# Patient Record
Sex: FEMALE | Race: WHITE | Employment: OTHER | URBAN - NONMETROPOLITAN AREA
[De-identification: names, ages, dates, MRNs, and addresses within clinical notes are randomized per-mention and may not be internally consistent; named-entity substitution may affect disease eponyms.]

---

## 2017-01-23 ENCOUNTER — DOCTOR'S OFFICE (OUTPATIENT)
Dept: URBAN - NONMETROPOLITAN AREA CLINIC 1 | Facility: CLINIC | Age: 82
Setting detail: OPHTHALMOLOGY
End: 2017-01-23
Payer: COMMERCIAL

## 2017-01-23 DIAGNOSIS — C44.102: ICD-10-CM

## 2017-01-23 DIAGNOSIS — H04.122: ICD-10-CM

## 2017-01-23 DIAGNOSIS — H02.832: ICD-10-CM

## 2017-01-23 DIAGNOSIS — H02.051: ICD-10-CM

## 2017-01-23 DIAGNOSIS — H04.121: ICD-10-CM

## 2017-01-23 PROCEDURE — 92012 INTRM OPH EXAM EST PATIENT: CPT | Performed by: OPHTHALMOLOGY

## 2017-01-23 PROCEDURE — 67820 REVISE EYELASHES: CPT | Performed by: OPHTHALMOLOGY

## 2017-01-23 PROCEDURE — 83861 MICROFLUID ANALY TEARS: CPT | Performed by: OPHTHALMOLOGY

## 2017-01-23 ASSESSMENT — REFRACTION_CURRENTRX
OD_VPRISM_DIRECTION: PROGS
OD_OVR_VA: 20/
OS_ADD: +2.75
OS_OVR_VA: 20/
OD_CYLINDER: -1.25
OD_OVR_VA: 20/
OD_AXIS: 65
OS_AXIS: 172
OD_ADD: +2.75
OS_OVR_VA: 20/
OS_VPRISM_DIRECTION: PROGS
OD_OVR_VA: 20/
OS_CYLINDER: -0.75
OS_OVR_VA: 20/
OD_SPHERE: +1.25
OS_SPHERE: +1.00

## 2017-01-23 ASSESSMENT — REFRACTION_AUTOREFRACTION
OS_AXIS: 7
OS_CYLINDER: -0.50
OD_SPHERE: +0.50
OD_CYLINDER: -0.25
OS_SPHERE: +1.25
OD_AXIS: 71

## 2017-01-23 ASSESSMENT — REFRACTION_MANIFEST
OD_VA1: 20/
OS_VA3: 20/
OD_VA1: 20/
OS_VA3: 20/
OD_VA2: 20/
OS_VA3: 20/
OU_VA: 20/
OD_VA2: 20/
OD_VA3: 20/
OD_VA3: 20/
OS_VA2: 20/
OS_VA1: 20/
OS_VA1: 20/
OU_VA: 20/
OD_VA1: 20/
OD_VA3: 20/
OU_VA: 20/
OS_VA1: 20/
OD_VA2: 20/

## 2017-01-23 ASSESSMENT — SPHEQUIV_DERIVED
OD_SPHEQUIV: 0.375
OS_SPHEQUIV: 1

## 2017-01-23 ASSESSMENT — LID POSITION - DERMATOCHALASIS
OD_DERMATOCHALASIS: 1+
OS_DERMATOCHALASIS: 1+

## 2017-01-23 ASSESSMENT — VISUAL ACUITY
OS_BCVA: 20/30
OD_BCVA: 20/30+1

## 2017-01-23 ASSESSMENT — SUPERFICIAL PUNCTATE KERATITIS (SPK)
OD_SPK: 1+ 2+
OS_SPK: 1+ 2+

## 2017-01-23 ASSESSMENT — LID EXAM ASSESSMENTS: OD_TRICHIASIS: RUL

## 2017-02-06 ENCOUNTER — DOCTOR'S OFFICE (OUTPATIENT)
Dept: URBAN - NONMETROPOLITAN AREA CLINIC 1 | Facility: CLINIC | Age: 82
Setting detail: OPHTHALMOLOGY
End: 2017-02-06
Payer: COMMERCIAL

## 2017-02-06 DIAGNOSIS — H10.11: ICD-10-CM

## 2017-02-06 DIAGNOSIS — H04.121: ICD-10-CM

## 2017-02-06 DIAGNOSIS — C44.102: ICD-10-CM

## 2017-02-06 DIAGNOSIS — H04.122: ICD-10-CM

## 2017-02-06 DIAGNOSIS — M31.6: ICD-10-CM

## 2017-02-06 DIAGNOSIS — H02.422: ICD-10-CM

## 2017-02-06 DIAGNOSIS — H02.832: ICD-10-CM

## 2017-02-06 DIAGNOSIS — H02.421: ICD-10-CM

## 2017-02-06 DIAGNOSIS — H10.12: ICD-10-CM

## 2017-02-06 DIAGNOSIS — H01.001: ICD-10-CM

## 2017-02-06 PROCEDURE — 92012 INTRM OPH EXAM EST PATIENT: CPT | Performed by: OPHTHALMOLOGY

## 2017-02-06 ASSESSMENT — REFRACTION_CURRENTRX
OD_OVR_VA: 20/
OD_OVR_VA: 20/
OS_ADD: +2.75
OD_ADD: +2.75
OS_VPRISM_DIRECTION: PROGS
OS_OVR_VA: 20/
OD_SPHERE: +1.25
OS_AXIS: 172
OD_OVR_VA: 20/
OD_AXIS: 65
OD_CYLINDER: -1.25
OS_CYLINDER: -0.75
OS_SPHERE: +1.00
OD_VPRISM_DIRECTION: PROGS
OS_OVR_VA: 20/
OS_OVR_VA: 20/

## 2017-02-06 ASSESSMENT — REFRACTION_MANIFEST
OD_VA2: 20/
OU_VA: 20/
OS_VA2: 20/
OD_VA3: 20/
OD_VA2: 20/
OU_VA: 20/
OS_VA1: 20/
OD_VA2: 20/
OD_VA1: 20/
OS_VA2: 20/
OS_VA3: 20/
OD_VA3: 20/
OS_VA1: 20/
OS_VA3: 20/
OU_VA: 20/
OD_VA1: 20/
OS_VA3: 20/
OD_VA3: 20/
OS_VA2: 20/
OS_VA1: 20/
OD_VA1: 20/

## 2017-02-06 ASSESSMENT — REFRACTION_AUTOREFRACTION
OS_SPHERE: +1.25
OD_SPHERE: +0.50
OS_CYLINDER: -0.50
OS_AXIS: 7
OD_AXIS: 71
OD_CYLINDER: -0.25

## 2017-02-06 ASSESSMENT — SUPERFICIAL PUNCTATE KERATITIS (SPK)
OD_SPK: 2+ 3+
OS_SPK: 2+

## 2017-02-06 ASSESSMENT — VISUAL ACUITY
OD_BCVA: 20/30+2
OS_BCVA: 20/30

## 2017-02-06 ASSESSMENT — LID POSITION - DERMATOCHALASIS
OS_DERMATOCHALASIS: 1+
OD_DERMATOCHALASIS: 1+

## 2017-02-06 ASSESSMENT — LID POSITION - PTOSIS
OS_PTOSIS: 1+
OD_PTOSIS: 1+

## 2017-02-06 ASSESSMENT — CONFRONTATIONAL VISUAL FIELD TEST (CVF)
OS_FINDINGS: FULL
OD_FINDINGS: FULL

## 2017-02-06 ASSESSMENT — SPHEQUIV_DERIVED
OD_SPHEQUIV: 0.375
OS_SPHEQUIV: 1

## 2017-03-13 ENCOUNTER — DOCTOR'S OFFICE (OUTPATIENT)
Dept: URBAN - NONMETROPOLITAN AREA CLINIC 1 | Facility: CLINIC | Age: 82
Setting detail: OPHTHALMOLOGY
End: 2017-03-13
Payer: COMMERCIAL

## 2017-03-13 DIAGNOSIS — H43.811: ICD-10-CM

## 2017-03-13 DIAGNOSIS — Z79.891: ICD-10-CM

## 2017-03-13 DIAGNOSIS — M06.9: ICD-10-CM

## 2017-03-13 DIAGNOSIS — H04.121: ICD-10-CM

## 2017-03-13 DIAGNOSIS — H04.122: ICD-10-CM

## 2017-03-13 DIAGNOSIS — H43.812: ICD-10-CM

## 2017-03-13 PROCEDURE — 92134 CPTRZ OPH DX IMG PST SGM RTA: CPT | Performed by: OPHTHALMOLOGY

## 2017-03-13 PROCEDURE — 92226 OPHTHALMOSCOPY EXT SUBSEQUENT: CPT | Performed by: OPHTHALMOLOGY

## 2017-03-13 PROCEDURE — 92014 COMPRE OPH EXAM EST PT 1/>: CPT | Performed by: OPHTHALMOLOGY

## 2017-03-13 ASSESSMENT — REFRACTION_MANIFEST
OD_VA2: 20/
OD_VA3: 20/
OS_VA3: 20/
OS_VA1: 20/
OU_VA: 20/
OS_VA3: 20/
OS_VA2: 20/
OD_VA1: 20/
OD_VA3: 20/
OS_VA3: 20/
OU_VA: 20/
OD_VA2: 20/
OD_VA3: 20/
OS_VA2: 20/
OD_VA1: 20/
OD_VA1: 20/
OS_VA1: 20/
OS_VA1: 20/
OS_VA2: 20/
OD_VA2: 20/
OU_VA: 20/

## 2017-03-13 ASSESSMENT — REFRACTION_AUTOREFRACTION
OD_AXIS: 71
OD_SPHERE: +0.50
OS_SPHERE: +1.25
OS_AXIS: 7
OS_CYLINDER: -0.50
OD_CYLINDER: -0.25

## 2017-03-13 ASSESSMENT — LID POSITION - PTOSIS
OS_PTOSIS: 1+
OD_PTOSIS: 1+

## 2017-03-13 ASSESSMENT — REFRACTION_CURRENTRX
OD_ADD: +2.75
OS_ADD: +2.75
OD_OVR_VA: 20/
OS_SPHERE: +1.00
OD_OVR_VA: 20/
OS_CYLINDER: -0.75
OS_VPRISM_DIRECTION: PROGS
OD_CYLINDER: -1.25
OS_OVR_VA: 20/
OS_AXIS: 172
OD_SPHERE: +1.25
OD_OVR_VA: 20/
OD_VPRISM_DIRECTION: PROGS
OS_OVR_VA: 20/
OD_AXIS: 65
OS_OVR_VA: 20/

## 2017-03-13 ASSESSMENT — SUPERFICIAL PUNCTATE KERATITIS (SPK)
OS_SPK: 2+
OD_SPK: 2+ 3+

## 2017-03-13 ASSESSMENT — VISUAL ACUITY
OD_BCVA: 20/40-2
OS_BCVA: 20/25-1

## 2017-03-13 ASSESSMENT — SPHEQUIV_DERIVED
OS_SPHEQUIV: 1
OD_SPHEQUIV: 0.375

## 2017-03-13 ASSESSMENT — LID POSITION - DERMATOCHALASIS
OD_DERMATOCHALASIS: 1+
OS_DERMATOCHALASIS: 1+

## 2017-03-13 ASSESSMENT — CONFRONTATIONAL VISUAL FIELD TEST (CVF)
OD_FINDINGS: FULL
OS_FINDINGS: FULL

## 2017-04-10 ENCOUNTER — DOCTOR'S OFFICE (OUTPATIENT)
Dept: URBAN - NONMETROPOLITAN AREA CLINIC 1 | Facility: CLINIC | Age: 82
Setting detail: OPHTHALMOLOGY
End: 2017-04-10
Payer: COMMERCIAL

## 2017-04-10 DIAGNOSIS — H04.122: ICD-10-CM

## 2017-04-10 DIAGNOSIS — C44.102: ICD-10-CM

## 2017-04-10 DIAGNOSIS — H02.421: ICD-10-CM

## 2017-04-10 DIAGNOSIS — H10.12: ICD-10-CM

## 2017-04-10 DIAGNOSIS — H10.11: ICD-10-CM

## 2017-04-10 DIAGNOSIS — H02.051: ICD-10-CM

## 2017-04-10 DIAGNOSIS — H04.121: ICD-10-CM

## 2017-04-10 DIAGNOSIS — H01.001: ICD-10-CM

## 2017-04-10 DIAGNOSIS — H02.832: ICD-10-CM

## 2017-04-10 PROCEDURE — 83861 MICROFLUID ANALY TEARS: CPT | Performed by: OPHTHALMOLOGY

## 2017-04-10 PROCEDURE — 92012 INTRM OPH EXAM EST PATIENT: CPT | Performed by: OPHTHALMOLOGY

## 2017-04-10 ASSESSMENT — REFRACTION_MANIFEST
OD_VA3: 20/
OD_VA2: 20/
OS_VA2: 20/
OU_VA: 20/
OS_VA3: 20/
OD_VA1: 20/
OD_VA2: 20/
OS_VA2: 20/
OU_VA: 20/
OU_VA: 20/
OD_VA3: 20/
OS_VA3: 20/
OS_VA1: 20/
OS_VA2: 20/
OD_VA3: 20/
OS_VA1: 20/
OD_VA2: 20/
OS_VA1: 20/
OD_VA1: 20/
OS_VA3: 20/
OD_VA1: 20/

## 2017-04-10 ASSESSMENT — REFRACTION_CURRENTRX
OS_ADD: +2.75
OD_OVR_VA: 20/
OS_OVR_VA: 20/
OD_CYLINDER: -1.25
OS_OVR_VA: 20/
OS_CYLINDER: -0.75
OD_OVR_VA: 20/
OD_AXIS: 65
OD_ADD: +2.75
OD_SPHERE: +1.25
OS_SPHERE: +1.00
OD_OVR_VA: 20/
OS_OVR_VA: 20/
OS_AXIS: 172
OS_VPRISM_DIRECTION: PROGS
OD_VPRISM_DIRECTION: PROGS

## 2017-04-10 ASSESSMENT — REFRACTION_AUTOREFRACTION
OS_SPHERE: +1.25
OS_CYLINDER: -0.50
OS_AXIS: 7
OD_SPHERE: +0.50
OD_AXIS: 71
OD_CYLINDER: -0.25

## 2017-04-10 ASSESSMENT — SUPERFICIAL PUNCTATE KERATITIS (SPK)
OD_SPK: 2+
OS_SPK: 2+

## 2017-04-10 ASSESSMENT — VISUAL ACUITY
OS_BCVA: 20/25-2
OD_BCVA: 20/40

## 2017-04-10 ASSESSMENT — LID POSITION - DERMATOCHALASIS
OS_DERMATOCHALASIS: 1+
OD_DERMATOCHALASIS: 1+

## 2017-04-10 ASSESSMENT — LID POSITION - PTOSIS
OD_PTOSIS: 1+
OS_PTOSIS: 1+

## 2017-04-10 ASSESSMENT — SPHEQUIV_DERIVED
OD_SPHEQUIV: 0.375
OS_SPHEQUIV: 1

## 2017-08-24 ENCOUNTER — DOCTOR'S OFFICE (OUTPATIENT)
Dept: URBAN - NONMETROPOLITAN AREA CLINIC 1 | Facility: CLINIC | Age: 82
Setting detail: OPHTHALMOLOGY
End: 2017-08-24
Payer: COMMERCIAL

## 2017-08-24 DIAGNOSIS — H02.051: ICD-10-CM

## 2017-08-24 DIAGNOSIS — H04.123: ICD-10-CM

## 2017-08-24 DIAGNOSIS — H43.812: ICD-10-CM

## 2017-08-24 DIAGNOSIS — H10.12: ICD-10-CM

## 2017-08-24 DIAGNOSIS — H10.11: ICD-10-CM

## 2017-08-24 DIAGNOSIS — H01.001: ICD-10-CM

## 2017-08-24 DIAGNOSIS — H02.832: ICD-10-CM

## 2017-08-24 DIAGNOSIS — H43.811: ICD-10-CM

## 2017-08-24 DIAGNOSIS — H43.813: ICD-10-CM

## 2017-08-24 DIAGNOSIS — H02.055: ICD-10-CM

## 2017-08-24 PROCEDURE — 92014 COMPRE OPH EXAM EST PT 1/>: CPT | Performed by: OPHTHALMOLOGY

## 2017-08-24 PROCEDURE — 92226 OPHTHALMOSCOPY EXT SUBSEQUENT: CPT | Performed by: OPHTHALMOLOGY

## 2017-08-24 PROCEDURE — 92134 CPTRZ OPH DX IMG PST SGM RTA: CPT | Performed by: OPHTHALMOLOGY

## 2017-08-24 ASSESSMENT — REFRACTION_MANIFEST
OS_VA2: 20/
OD_VA3: 20/
OD_VA1: 20/
OU_VA: 20/
OD_VA3: 20/
OD_VA3: 20/
OS_VA3: 20/
OD_VA1: 20/
OD_VA2: 20/
OS_VA2: 20/
OS_VA1: 20/
OS_VA3: 20/
OD_VA2: 20/
OS_VA2: 20/
OS_VA3: 20/
OU_VA: 20/
OD_VA2: 20/
OD_VA1: 20/
OS_VA1: 20/
OU_VA: 20/
OS_VA1: 20/

## 2017-08-24 ASSESSMENT — REFRACTION_AUTOREFRACTION
OD_CYLINDER: -0.25
OS_SPHERE: +1.25
OD_AXIS: 71
OS_CYLINDER: -0.50
OS_AXIS: 7
OD_SPHERE: +0.50

## 2017-08-24 ASSESSMENT — REFRACTION_CURRENTRX
OS_ADD: +2.75
OS_OVR_VA: 20/
OD_ADD: +2.75
OS_SPHERE: +1.00
OD_AXIS: 65
OD_OVR_VA: 20/
OD_CYLINDER: -1.25
OD_VPRISM_DIRECTION: PROGS
OD_SPHERE: +1.25
OS_VPRISM_DIRECTION: PROGS
OS_CYLINDER: -0.75
OS_AXIS: 172
OD_OVR_VA: 20/
OS_OVR_VA: 20/
OD_OVR_VA: 20/
OS_OVR_VA: 20/

## 2017-08-24 ASSESSMENT — SUPERFICIAL PUNCTATE KERATITIS (SPK)
OS_SPK: 2+
OD_SPK: 2+

## 2017-08-24 ASSESSMENT — CONFRONTATIONAL VISUAL FIELD TEST (CVF)
OS_FINDINGS: FULL
OD_FINDINGS: FULL

## 2017-08-24 ASSESSMENT — SPHEQUIV_DERIVED
OS_SPHEQUIV: 1
OD_SPHEQUIV: 0.375

## 2017-08-24 ASSESSMENT — LID POSITION - DERMATOCHALASIS
OS_DERMATOCHALASIS: 1+
OD_DERMATOCHALASIS: 1+

## 2017-08-24 ASSESSMENT — VISUAL ACUITY
OD_BCVA: 20/50+2
OS_BCVA: 20/30+2

## 2017-08-24 ASSESSMENT — LID POSITION - PTOSIS
OD_PTOSIS: 1+
OS_PTOSIS: 1+

## 2017-10-16 ENCOUNTER — DOCTOR'S OFFICE (OUTPATIENT)
Dept: URBAN - NONMETROPOLITAN AREA CLINIC 1 | Facility: CLINIC | Age: 82
Setting detail: OPHTHALMOLOGY
End: 2017-10-16
Payer: COMMERCIAL

## 2017-10-16 DIAGNOSIS — H04.122: ICD-10-CM

## 2017-10-16 DIAGNOSIS — H02.055: ICD-10-CM

## 2017-10-16 DIAGNOSIS — H04.123: ICD-10-CM

## 2017-10-16 DIAGNOSIS — Z79.891: ICD-10-CM

## 2017-10-16 DIAGNOSIS — H02.423: ICD-10-CM

## 2017-10-16 DIAGNOSIS — C44.102: ICD-10-CM

## 2017-10-16 DIAGNOSIS — H02.051: ICD-10-CM

## 2017-10-16 DIAGNOSIS — H02.832: ICD-10-CM

## 2017-10-16 DIAGNOSIS — H04.121: ICD-10-CM

## 2017-10-16 PROCEDURE — 83861 MICROFLUID ANALY TEARS: CPT | Performed by: OPHTHALMOLOGY

## 2017-10-16 PROCEDURE — 99214 OFFICE O/P EST MOD 30 MIN: CPT | Performed by: OPHTHALMOLOGY

## 2017-10-16 ASSESSMENT — SPHEQUIV_DERIVED
OD_SPHEQUIV: 0
OS_SPHEQUIV: 0.75

## 2017-10-16 ASSESSMENT — SUPERFICIAL PUNCTATE KERATITIS (SPK)
OS_SPK: T
OD_SPK: T

## 2017-10-16 ASSESSMENT — REFRACTION_CURRENTRX
OD_OVR_VA: 20/
OD_AXIS: 65
OS_VPRISM_DIRECTION: PROGS
OS_OVR_VA: 20/
OD_OVR_VA: 20/
OS_OVR_VA: 20/
OS_OVR_VA: 20/
OD_VPRISM_DIRECTION: PROGS
OD_OVR_VA: 20/
OD_ADD: +2.75
OD_SPHERE: +1.25
OD_CYLINDER: -1.25
OS_ADD: +2.75
OS_AXIS: 172
OS_CYLINDER: -0.75
OS_SPHERE: +1.00

## 2017-10-16 ASSESSMENT — REFRACTION_MANIFEST
OS_VA2: 20/
OD_VA3: 20/
OS_VA2: 20/
OD_VA1: 20/
OS_VA1: 20/
OD_VA2: 20/
OS_VA1: 20/
OU_VA: 20/
OD_VA3: 20/
OU_VA: 20/
OS_VA3: 20/
OD_VA2: 20/
OS_VA3: 20/
OS_VA1: 20/
OS_VA2: 20/
OS_VA3: 20/
OU_VA: 20/
OD_VA3: 20/
OD_VA2: 20/

## 2017-10-16 ASSESSMENT — VISUAL ACUITY
OS_BCVA: 20/30-2
OD_BCVA: 20/40

## 2017-10-16 ASSESSMENT — LID POSITION - PTOSIS
OD_PTOSIS: 1+
OS_PTOSIS: 1+

## 2017-10-16 ASSESSMENT — REFRACTION_AUTOREFRACTION
OD_CYLINDER: -0.50
OD_SPHERE: +0.25
OS_SPHERE: +1.00
OS_CYLINDER: -0.50
OD_AXIS: 41
OS_AXIS: 12

## 2017-10-16 ASSESSMENT — CONFRONTATIONAL VISUAL FIELD TEST (CVF)
OS_FINDINGS: FULL
OD_FINDINGS: FULL

## 2017-10-16 ASSESSMENT — LID POSITION - DERMATOCHALASIS
OD_DERMATOCHALASIS: 1+
OS_DERMATOCHALASIS: 1+

## 2017-12-07 ENCOUNTER — DOCTOR'S OFFICE (OUTPATIENT)
Dept: URBAN - NONMETROPOLITAN AREA CLINIC 1 | Facility: CLINIC | Age: 82
Setting detail: OPHTHALMOLOGY
End: 2017-12-07
Payer: COMMERCIAL

## 2017-12-07 DIAGNOSIS — H02.055: ICD-10-CM

## 2017-12-07 DIAGNOSIS — H04.123: ICD-10-CM

## 2017-12-07 DIAGNOSIS — H43.812: ICD-10-CM

## 2017-12-07 DIAGNOSIS — H04.122: ICD-10-CM

## 2017-12-07 DIAGNOSIS — H43.811: ICD-10-CM

## 2017-12-07 DIAGNOSIS — H04.121: ICD-10-CM

## 2017-12-07 DIAGNOSIS — H02.051: ICD-10-CM

## 2017-12-07 DIAGNOSIS — Z79.891: ICD-10-CM

## 2017-12-07 DIAGNOSIS — H43.813: ICD-10-CM

## 2017-12-07 PROBLEM — H43.11: Status: RESOLVED | Noted: 2017-03-13 | Resolved: 2017-12-07

## 2017-12-07 PROCEDURE — 83861 MICROFLUID ANALY TEARS: CPT | Performed by: OPHTHALMOLOGY

## 2017-12-07 PROCEDURE — 92226 OPHTHALMOSCOPY EXT SUBSEQUENT: CPT | Performed by: OPHTHALMOLOGY

## 2017-12-07 PROCEDURE — 92134 CPTRZ OPH DX IMG PST SGM RTA: CPT | Performed by: OPHTHALMOLOGY

## 2017-12-07 PROCEDURE — 92014 COMPRE OPH EXAM EST PT 1/>: CPT | Performed by: OPHTHALMOLOGY

## 2017-12-07 ASSESSMENT — REFRACTION_MANIFEST
OS_VA3: 20/
OS_VA2: 20/
OU_VA: 20/
OU_VA: 20/
OS_VA3: 20/
OD_VA1: 20/
OS_VA2: 20/
OD_VA3: 20/
OD_VA2: 20/
OS_VA3: 20/
OD_VA2: 20/
OD_VA1: 20/
OS_VA1: 20/
OD_VA2: 20/
OD_VA3: 20/
OS_VA1: 20/
OS_VA1: 20/
OU_VA: 20/
OD_VA3: 20/
OD_VA1: 20/
OS_VA2: 20/

## 2017-12-07 ASSESSMENT — LID POSITION - DERMATOCHALASIS
OD_DERMATOCHALASIS: 1+
OS_DERMATOCHALASIS: 1+

## 2017-12-07 ASSESSMENT — REFRACTION_CURRENTRX
OS_OVR_VA: 20/
OS_OVR_VA: 20/
OS_ADD: +2.75
OS_AXIS: 172
OS_OVR_VA: 20/
OD_AXIS: 65
OD_OVR_VA: 20/
OD_CYLINDER: -1.25
OD_OVR_VA: 20/
OD_SPHERE: +1.25
OS_SPHERE: +1.00
OS_VPRISM_DIRECTION: PROGS
OD_ADD: +2.75
OS_CYLINDER: -0.75
OD_OVR_VA: 20/
OD_VPRISM_DIRECTION: PROGS

## 2017-12-07 ASSESSMENT — REFRACTION_AUTOREFRACTION
OS_SPHERE: +1.00
OS_AXIS: 12
OD_CYLINDER: -0.50
OD_SPHERE: +0.25
OD_AXIS: 41
OS_CYLINDER: -0.50

## 2017-12-07 ASSESSMENT — DECREASING TEAR LAKE - SEVERITY SCORE
OD_DEC_TEARLAKE: 3+ 4+
OS_DEC_TEARLAKE: 3+ 4+

## 2017-12-07 ASSESSMENT — VISUAL ACUITY
OD_BCVA: 20/30-2
OS_BCVA: 20/30+2

## 2017-12-07 ASSESSMENT — CONFRONTATIONAL VISUAL FIELD TEST (CVF)
OS_FINDINGS: FULL
OD_FINDINGS: FULL

## 2017-12-07 ASSESSMENT — SUPERFICIAL PUNCTATE KERATITIS (SPK)
OS_SPK: 2+
OD_SPK: 2+

## 2017-12-07 ASSESSMENT — SPHEQUIV_DERIVED
OS_SPHEQUIV: 0.75
OD_SPHEQUIV: 0

## 2017-12-07 ASSESSMENT — LID POSITION - PTOSIS
OD_PTOSIS: 1+
OS_PTOSIS: 1+

## 2018-03-02 ENCOUNTER — DOCTOR'S OFFICE (OUTPATIENT)
Dept: URBAN - METROPOLITAN AREA CLINIC 125 | Facility: CLINIC | Age: 83
Setting detail: OPHTHALMOLOGY
End: 2018-03-02
Payer: COMMERCIAL

## 2018-03-02 DIAGNOSIS — H43.812: ICD-10-CM

## 2018-03-02 DIAGNOSIS — C44.102: ICD-10-CM

## 2018-03-02 DIAGNOSIS — H04.122: ICD-10-CM

## 2018-03-02 DIAGNOSIS — M31.6: ICD-10-CM

## 2018-03-02 DIAGNOSIS — H04.121: ICD-10-CM

## 2018-03-02 DIAGNOSIS — H43.811: ICD-10-CM

## 2018-03-02 DIAGNOSIS — Z79.891: ICD-10-CM

## 2018-03-02 PROCEDURE — 83861 MICROFLUID ANALY TEARS: CPT | Performed by: OPHTHALMOLOGY

## 2018-03-02 PROCEDURE — 92014 COMPRE OPH EXAM EST PT 1/>: CPT | Performed by: OPHTHALMOLOGY

## 2018-03-02 PROCEDURE — 92134 CPTRZ OPH DX IMG PST SGM RTA: CPT | Performed by: OPHTHALMOLOGY

## 2018-03-02 PROCEDURE — 92226 OPHTHALMOSCOPY EXT SUBSEQUENT: CPT | Performed by: OPHTHALMOLOGY

## 2018-03-02 ASSESSMENT — REFRACTION_MANIFEST
OS_VA1: 20/
OS_VA1: 20/
OD_VA3: 20/
OD_VA1: 20/
OS_VA2: 20/
OD_VA1: 20/
OD_VA2: 20/
OS_VA2: 20/
OU_VA: 20/
OU_VA: 20/
OD_VA2: 20/
OS_VA3: 20/
OS_VA3: 20/
OD_VA3: 20/
OD_VA3: 20/
OD_VA1: 20/
OU_VA: 20/
OS_VA3: 20/
OS_VA2: 20/
OD_VA2: 20/
OS_VA1: 20/

## 2018-03-02 ASSESSMENT — REFRACTION_AUTOREFRACTION
OS_SPHERE: +1.00
OD_AXIS: 41
OS_AXIS: 12
OD_SPHERE: +0.25
OS_CYLINDER: -0.50
OD_CYLINDER: -0.50

## 2018-03-02 ASSESSMENT — CONFRONTATIONAL VISUAL FIELD TEST (CVF)
OS_FINDINGS: FULL
OD_FINDINGS: FULL

## 2018-03-02 ASSESSMENT — LID POSITION - PTOSIS
OS_PTOSIS: 1+
OD_PTOSIS: 1+

## 2018-03-02 ASSESSMENT — SUPERFICIAL PUNCTATE KERATITIS (SPK)
OD_SPK: ABSENT
OS_SPK: 1+

## 2018-03-02 ASSESSMENT — REFRACTION_CURRENTRX
OS_OVR_VA: 20/
OD_OVR_VA: 20/
OD_ADD: +2.75
OS_CYLINDER: -0.75
OS_AXIS: 172
OD_VPRISM_DIRECTION: PROGS
OD_AXIS: 65
OS_SPHERE: +1.00
OD_OVR_VA: 20/
OD_OVR_VA: 20/
OS_VPRISM_DIRECTION: PROGS
OS_OVR_VA: 20/
OD_CYLINDER: -1.25
OS_ADD: +2.75
OS_OVR_VA: 20/
OD_SPHERE: +1.25

## 2018-03-02 ASSESSMENT — LID POSITION - DERMATOCHALASIS
OD_DERMATOCHALASIS: 1+
OS_DERMATOCHALASIS: 1+

## 2018-03-02 ASSESSMENT — VISUAL ACUITY
OS_BCVA: 20/30-1
OD_BCVA: 20/30-1

## 2018-03-02 ASSESSMENT — SPHEQUIV_DERIVED
OS_SPHEQUIV: 0.75
OD_SPHEQUIV: 0

## 2018-03-16 ENCOUNTER — DOCTOR'S OFFICE (OUTPATIENT)
Dept: URBAN - NONMETROPOLITAN AREA CLINIC 1 | Facility: CLINIC | Age: 83
Setting detail: OPHTHALMOLOGY
End: 2018-03-16
Payer: COMMERCIAL

## 2018-03-16 DIAGNOSIS — M31.6: ICD-10-CM

## 2018-03-16 DIAGNOSIS — H04.123: ICD-10-CM

## 2018-03-16 DIAGNOSIS — H43.811: ICD-10-CM

## 2018-03-16 DIAGNOSIS — H43.812: ICD-10-CM

## 2018-03-16 DIAGNOSIS — H43.813: ICD-10-CM

## 2018-03-16 DIAGNOSIS — Z79.891: ICD-10-CM

## 2018-03-16 DIAGNOSIS — C44.102: ICD-10-CM

## 2018-03-16 PROCEDURE — 92226 OPHTHALMOSCOPY EXT SUBSEQUENT: CPT | Performed by: OPHTHALMOLOGY

## 2018-03-16 PROCEDURE — 92014 COMPRE OPH EXAM EST PT 1/>: CPT | Performed by: OPHTHALMOLOGY

## 2018-03-16 ASSESSMENT — REFRACTION_MANIFEST
OD_VA3: 20/
OS_VA1: 20/
OD_VA3: 20/
OS_VA1: 20/
OS_VA3: 20/
OD_VA2: 20/
OS_VA3: 20/
OU_VA: 20/
OS_VA2: 20/
OS_VA2: 20/
OD_VA2: 20/
OU_VA: 20/
OS_VA3: 20/
OS_VA1: 20/
OD_VA1: 20/
OD_VA1: 20/
OS_VA2: 20/
OD_VA3: 20/
OD_VA1: 20/
OU_VA: 20/
OD_VA2: 20/

## 2018-03-16 ASSESSMENT — SUPERFICIAL PUNCTATE KERATITIS (SPK)
OD_SPK: ABSENT
OS_SPK: 1+

## 2018-03-16 ASSESSMENT — REFRACTION_CURRENTRX
OS_OVR_VA: 20/
OS_OVR_VA: 20/
OD_OVR_VA: 20/
OS_AXIS: 172
OS_ADD: +2.75
OS_SPHERE: +1.00
OD_CYLINDER: -1.25
OD_AXIS: 65
OD_VPRISM_DIRECTION: PROGS
OD_OVR_VA: 20/
OS_CYLINDER: -0.75
OS_VPRISM_DIRECTION: PROGS
OD_SPHERE: +1.25
OD_ADD: +2.75
OD_OVR_VA: 20/
OS_OVR_VA: 20/

## 2018-03-16 ASSESSMENT — LID POSITION - DERMATOCHALASIS
OS_DERMATOCHALASIS: 1+
OD_DERMATOCHALASIS: 1+

## 2018-03-16 ASSESSMENT — REFRACTION_AUTOREFRACTION
OD_SPHERE: +0.25
OD_AXIS: 41
OS_CYLINDER: -0.50
OS_AXIS: 12
OD_CYLINDER: -0.50
OS_SPHERE: +1.00

## 2018-03-16 ASSESSMENT — LID POSITION - PTOSIS
OS_PTOSIS: 1+
OD_PTOSIS: 1+

## 2018-03-16 ASSESSMENT — VISUAL ACUITY
OS_BCVA: 20/30-1
OD_BCVA: 20/30-1

## 2018-03-16 ASSESSMENT — CONFRONTATIONAL VISUAL FIELD TEST (CVF)
OD_FINDINGS: FULL
OS_FINDINGS: FULL

## 2018-03-16 ASSESSMENT — SPHEQUIV_DERIVED
OD_SPHEQUIV: 0
OS_SPHEQUIV: 0.75

## 2018-03-19 ENCOUNTER — DOCTOR'S OFFICE (OUTPATIENT)
Dept: URBAN - NONMETROPOLITAN AREA CLINIC 1 | Facility: CLINIC | Age: 83
Setting detail: OPHTHALMOLOGY
End: 2018-03-19
Payer: COMMERCIAL

## 2018-03-19 DIAGNOSIS — Z79.891: ICD-10-CM

## 2018-03-19 DIAGNOSIS — H04.123: ICD-10-CM

## 2018-03-19 DIAGNOSIS — C44.102: ICD-10-CM

## 2018-03-19 DIAGNOSIS — M31.6: ICD-10-CM

## 2018-03-19 DIAGNOSIS — H43.813: ICD-10-CM

## 2018-03-19 PROCEDURE — 92014 COMPRE OPH EXAM EST PT 1/>: CPT | Performed by: OPHTHALMOLOGY

## 2018-03-19 PROCEDURE — 92250 FUNDUS PHOTOGRAPHY W/I&R: CPT | Performed by: OPHTHALMOLOGY

## 2018-03-19 PROCEDURE — 92235 FLUORESCEIN ANGRPH MLTIFRAME: CPT | Performed by: OPHTHALMOLOGY

## 2018-03-19 ASSESSMENT — REFRACTION_CURRENTRX
OS_AXIS: 172
OD_OVR_VA: 20/
OD_OVR_VA: 20/
OD_CYLINDER: -1.25
OD_VPRISM_DIRECTION: PROGS
OD_SPHERE: +1.25
OS_VPRISM_DIRECTION: PROGS
OS_OVR_VA: 20/
OS_SPHERE: +1.00
OS_OVR_VA: 20/
OS_OVR_VA: 20/
OD_ADD: +2.75
OD_AXIS: 65
OD_OVR_VA: 20/
OS_ADD: +2.75
OS_CYLINDER: -0.75

## 2018-03-19 ASSESSMENT — REFRACTION_MANIFEST
OS_VA3: 20/
OS_VA3: 20/
OU_VA: 20/
OD_VA2: 20/
OD_VA2: 20/
OD_VA3: 20/
OS_VA2: 20/
OD_VA1: 20/
OD_VA2: 20/
OU_VA: 20/
OS_VA1: 20/
OD_VA1: 20/
OD_VA1: 20/
OS_VA3: 20/
OD_VA3: 20/
OD_VA3: 20/
OU_VA: 20/
OS_VA1: 20/
OS_VA1: 20/
OS_VA2: 20/
OS_VA2: 20/

## 2018-03-19 ASSESSMENT — SUPERFICIAL PUNCTATE KERATITIS (SPK)
OS_SPK: 1+
OD_SPK: ABSENT

## 2018-03-19 ASSESSMENT — REFRACTION_AUTOREFRACTION
OD_CYLINDER: -0.50
OS_AXIS: 12
OD_SPHERE: +0.25
OS_CYLINDER: -0.50
OD_AXIS: 41
OS_SPHERE: +1.00

## 2018-03-19 ASSESSMENT — LID POSITION - DERMATOCHALASIS
OD_DERMATOCHALASIS: 1+
OS_DERMATOCHALASIS: 1+

## 2018-03-19 ASSESSMENT — SPHEQUIV_DERIVED
OS_SPHEQUIV: 0.75
OD_SPHEQUIV: 0

## 2018-03-19 ASSESSMENT — CONFRONTATIONAL VISUAL FIELD TEST (CVF)
OD_FINDINGS: FULL
OS_FINDINGS: FULL

## 2018-03-19 ASSESSMENT — LID POSITION - PTOSIS
OS_PTOSIS: 1+
OD_PTOSIS: 1+

## 2018-03-19 ASSESSMENT — VISUAL ACUITY
OD_BCVA: 20/30-2
OS_BCVA: 20/30-2

## 2018-04-05 ENCOUNTER — DOCTOR'S OFFICE (OUTPATIENT)
Dept: URBAN - NONMETROPOLITAN AREA CLINIC 1 | Facility: CLINIC | Age: 83
Setting detail: OPHTHALMOLOGY
End: 2018-04-05
Payer: COMMERCIAL

## 2018-04-05 DIAGNOSIS — Z79.891: ICD-10-CM

## 2018-04-05 DIAGNOSIS — H43.813: ICD-10-CM

## 2018-04-05 DIAGNOSIS — M31.6: ICD-10-CM

## 2018-04-05 DIAGNOSIS — H04.121: ICD-10-CM

## 2018-04-05 DIAGNOSIS — H04.122: ICD-10-CM

## 2018-04-05 DIAGNOSIS — H04.123: ICD-10-CM

## 2018-04-05 DIAGNOSIS — C44.102: ICD-10-CM

## 2018-04-05 PROCEDURE — 92134 CPTRZ OPH DX IMG PST SGM RTA: CPT | Performed by: OPHTHALMOLOGY

## 2018-04-05 PROCEDURE — 92014 COMPRE OPH EXAM EST PT 1/>: CPT | Performed by: OPHTHALMOLOGY

## 2018-04-05 PROCEDURE — 83861 MICROFLUID ANALY TEARS: CPT | Performed by: OPHTHALMOLOGY

## 2018-04-05 ASSESSMENT — LID POSITION - DERMATOCHALASIS
OS_DERMATOCHALASIS: 1+
OD_DERMATOCHALASIS: 1+

## 2018-04-05 ASSESSMENT — VISUAL ACUITY
OS_BCVA: 20/40
OD_BCVA: 20/30+2

## 2018-04-05 ASSESSMENT — REFRACTION_CURRENTRX
OD_OVR_VA: 20/
OS_OVR_VA: 20/
OS_CYLINDER: -0.75
OD_CYLINDER: -1.25
OD_ADD: +2.75
OD_SPHERE: +1.25
OD_OVR_VA: 20/
OD_AXIS: 65
OS_SPHERE: +1.00
OS_AXIS: 172
OS_OVR_VA: 20/
OD_OVR_VA: 20/
OD_VPRISM_DIRECTION: PROGS
OS_VPRISM_DIRECTION: PROGS
OS_ADD: +2.75
OS_OVR_VA: 20/

## 2018-04-05 ASSESSMENT — REFRACTION_MANIFEST
OD_VA3: 20/
OD_VA3: 20/
OS_VA2: 20/
OS_VA1: 20/
OU_VA: 20/
OD_VA2: 20/
OS_VA2: 20/
OD_VA3: 20/
OD_VA1: 20/
OS_VA3: 20/
OU_VA: 20/
OS_VA2: 20/
OD_VA1: 20/
OD_VA1: 20/
OS_VA3: 20/
OD_VA2: 20/
OS_VA1: 20/
OS_VA3: 20/
OS_VA1: 20/
OD_VA2: 20/
OU_VA: 20/

## 2018-04-05 ASSESSMENT — LID POSITION - PTOSIS
OS_PTOSIS: 1+
OD_PTOSIS: 1+

## 2018-04-05 ASSESSMENT — REFRACTION_AUTOREFRACTION
OD_CYLINDER: -0.50
OS_CYLINDER: -0.50
OS_AXIS: 12
OD_AXIS: 41
OD_SPHERE: +0.25
OS_SPHERE: +1.00

## 2018-04-05 ASSESSMENT — SUPERFICIAL PUNCTATE KERATITIS (SPK)
OD_SPK: ABSENT
OS_SPK: 1+

## 2018-04-05 ASSESSMENT — CONFRONTATIONAL VISUAL FIELD TEST (CVF)
OD_FINDINGS: FULL
OS_FINDINGS: FULL

## 2018-04-05 ASSESSMENT — SPHEQUIV_DERIVED
OD_SPHEQUIV: 0
OS_SPHEQUIV: 0.75

## 2018-05-18 ENCOUNTER — DOCTOR'S OFFICE (OUTPATIENT)
Dept: URBAN - NONMETROPOLITAN AREA CLINIC 1 | Facility: CLINIC | Age: 83
Setting detail: OPHTHALMOLOGY
End: 2018-05-18
Payer: COMMERCIAL

## 2018-05-18 DIAGNOSIS — Z79.891: ICD-10-CM

## 2018-05-18 DIAGNOSIS — H04.121: ICD-10-CM

## 2018-05-18 DIAGNOSIS — H04.123: ICD-10-CM

## 2018-05-18 DIAGNOSIS — H43.812: ICD-10-CM

## 2018-05-18 DIAGNOSIS — H43.811: ICD-10-CM

## 2018-05-18 DIAGNOSIS — M31.6: ICD-10-CM

## 2018-05-18 DIAGNOSIS — H43.813: ICD-10-CM

## 2018-05-18 DIAGNOSIS — H02.051: ICD-10-CM

## 2018-05-18 DIAGNOSIS — H02.055: ICD-10-CM

## 2018-05-18 DIAGNOSIS — H04.122: ICD-10-CM

## 2018-05-18 PROCEDURE — 92014 COMPRE OPH EXAM EST PT 1/>: CPT | Performed by: OPHTHALMOLOGY

## 2018-05-18 PROCEDURE — 92226 OPHTHALMOSCOPY EXT SUBSEQUENT: CPT | Performed by: OPHTHALMOLOGY

## 2018-05-18 PROCEDURE — 83861 MICROFLUID ANALY TEARS: CPT | Performed by: OPHTHALMOLOGY

## 2018-05-18 PROCEDURE — 92134 CPTRZ OPH DX IMG PST SGM RTA: CPT | Performed by: OPHTHALMOLOGY

## 2018-05-18 ASSESSMENT — REFRACTION_MANIFEST
OD_VA2: 20/
OS_VA2: 20/
OS_VA3: 20/
OD_VA3: 20/
OS_VA1: 20/
OS_VA3: 20/
OD_VA1: 20/
OS_VA1: 20/
OS_VA2: 20/
OS_VA2: 20/
OD_VA2: 20/
OD_VA1: 20/
OS_VA1: 20/
OD_VA3: 20/
OD_VA3: 20/
OU_VA: 20/
OD_VA2: 20/
OS_VA3: 20/
OU_VA: 20/
OU_VA: 20/
OD_VA1: 20/

## 2018-05-18 ASSESSMENT — REFRACTION_CURRENTRX
OS_OVR_VA: 20/
OS_CYLINDER: -0.75
OD_SPHERE: +1.25
OS_OVR_VA: 20/
OD_OVR_VA: 20/
OD_AXIS: 65
OD_CYLINDER: -1.25
OD_ADD: +2.75
OD_OVR_VA: 20/
OS_VPRISM_DIRECTION: PROGS
OD_OVR_VA: 20/
OS_AXIS: 172
OS_ADD: +2.75
OD_VPRISM_DIRECTION: PROGS
OS_OVR_VA: 20/
OS_SPHERE: +1.00

## 2018-05-18 ASSESSMENT — SUPERFICIAL PUNCTATE KERATITIS (SPK)
OD_SPK: ABSENT
OS_SPK: 1+

## 2018-05-18 ASSESSMENT — LID POSITION - DERMATOCHALASIS
OD_DERMATOCHALASIS: 1+
OS_DERMATOCHALASIS: 1+

## 2018-05-18 ASSESSMENT — CONFRONTATIONAL VISUAL FIELD TEST (CVF)
OD_FINDINGS: FULL
OS_FINDINGS: FULL

## 2018-05-18 ASSESSMENT — SPHEQUIV_DERIVED
OD_SPHEQUIV: 0
OS_SPHEQUIV: 0.75

## 2018-05-18 ASSESSMENT — REFRACTION_AUTOREFRACTION
OD_AXIS: 41
OS_SPHERE: +1.00
OD_CYLINDER: -0.50
OS_AXIS: 12
OD_SPHERE: +0.25
OS_CYLINDER: -0.50

## 2018-05-18 ASSESSMENT — VISUAL ACUITY
OD_BCVA: 20/30+1
OS_BCVA: 20/50+2

## 2018-05-18 ASSESSMENT — LID POSITION - PTOSIS
OD_PTOSIS: 1+
OS_PTOSIS: 1+

## 2018-06-18 ENCOUNTER — DOCTOR'S OFFICE (OUTPATIENT)
Dept: URBAN - NONMETROPOLITAN AREA CLINIC 1 | Facility: CLINIC | Age: 83
Setting detail: OPHTHALMOLOGY
End: 2018-06-18
Payer: COMMERCIAL

## 2018-06-18 DIAGNOSIS — H04.123: ICD-10-CM

## 2018-06-18 DIAGNOSIS — H43.812: ICD-10-CM

## 2018-06-18 DIAGNOSIS — H04.121: ICD-10-CM

## 2018-06-18 DIAGNOSIS — H02.422: ICD-10-CM

## 2018-06-18 DIAGNOSIS — H02.421: ICD-10-CM

## 2018-06-18 DIAGNOSIS — H02.832: ICD-10-CM

## 2018-06-18 DIAGNOSIS — H43.811: ICD-10-CM

## 2018-06-18 DIAGNOSIS — C44.102: ICD-10-CM

## 2018-06-18 DIAGNOSIS — H04.122: ICD-10-CM

## 2018-06-18 DIAGNOSIS — Z79.891: ICD-10-CM

## 2018-06-18 DIAGNOSIS — M31.6: ICD-10-CM

## 2018-06-18 PROCEDURE — 83861 MICROFLUID ANALY TEARS: CPT | Performed by: OPHTHALMOLOGY

## 2018-06-18 PROCEDURE — 92014 COMPRE OPH EXAM EST PT 1/>: CPT | Performed by: OPHTHALMOLOGY

## 2018-06-18 ASSESSMENT — REFRACTION_MANIFEST
OD_VA1: 20/
OD_VA2: 20/
OD_VA2: 20/
OD_VA3: 20/
OS_VA2: 20/
OS_VA3: 20/
OU_VA: 20/
OU_VA: 20/
OD_VA3: 20/
OS_VA1: 20/
OD_VA1: 20/
OU_VA: 20/
OD_VA2: 20/
OS_VA1: 20/
OS_VA3: 20/
OS_VA2: 20/
OS_VA2: 20/
OD_VA1: 20/
OD_VA3: 20/
OS_VA1: 20/
OS_VA3: 20/

## 2018-06-18 ASSESSMENT — REFRACTION_CURRENTRX
OS_ADD: +2.75
OD_CYLINDER: -1.25
OS_SPHERE: +1.00
OD_ADD: +2.75
OS_VPRISM_DIRECTION: PROGS
OS_OVR_VA: 20/
OD_AXIS: 65
OS_CYLINDER: -0.75
OS_OVR_VA: 20/
OD_VPRISM_DIRECTION: PROGS
OD_OVR_VA: 20/
OD_OVR_VA: 20/
OD_SPHERE: +1.25
OS_OVR_VA: 20/
OS_AXIS: 172
OD_OVR_VA: 20/

## 2018-06-18 ASSESSMENT — LID POSITION - DERMATOCHALASIS
OS_DERMATOCHALASIS: 1+
OD_DERMATOCHALASIS: 1+

## 2018-06-18 ASSESSMENT — REFRACTION_AUTOREFRACTION
OD_AXIS: 41
OD_SPHERE: +0.25
OS_SPHERE: +1.00
OS_CYLINDER: -0.50
OD_CYLINDER: -0.50
OS_AXIS: 12

## 2018-06-18 ASSESSMENT — VISUAL ACUITY
OD_BCVA: 20/30+1
OS_BCVA: 20/50+2

## 2018-06-18 ASSESSMENT — SPHEQUIV_DERIVED
OS_SPHEQUIV: 0.75
OD_SPHEQUIV: 0

## 2018-06-18 ASSESSMENT — CONFRONTATIONAL VISUAL FIELD TEST (CVF)
OS_FINDINGS: FULL
OD_FINDINGS: FULL

## 2018-06-18 ASSESSMENT — SUPERFICIAL PUNCTATE KERATITIS (SPK)
OS_SPK: 1+ 2+
OD_SPK: 1+ 2+

## 2018-06-18 ASSESSMENT — LID POSITION - PTOSIS
OD_PTOSIS: 1+
OS_PTOSIS: 1+

## 2018-10-25 ENCOUNTER — DOCTOR'S OFFICE (OUTPATIENT)
Dept: URBAN - NONMETROPOLITAN AREA CLINIC 1 | Facility: CLINIC | Age: 83
Setting detail: OPHTHALMOLOGY
End: 2018-10-25
Payer: COMMERCIAL

## 2018-10-25 ENCOUNTER — RX ONLY (RX ONLY)
Age: 83
End: 2018-10-25

## 2018-10-25 DIAGNOSIS — H04.121: ICD-10-CM

## 2018-10-25 DIAGNOSIS — H04.123: ICD-10-CM

## 2018-10-25 DIAGNOSIS — H04.122: ICD-10-CM

## 2018-10-25 PROCEDURE — 99214 OFFICE O/P EST MOD 30 MIN: CPT | Performed by: PHYSICIAN ASSISTANT

## 2018-10-25 ASSESSMENT — REFRACTION_CURRENTRX
OS_OVR_VA: 20/
OD_OVR_VA: 20/
OS_OVR_VA: 20/
OS_VPRISM_DIRECTION: PROGS
OS_OVR_VA: 20/
OS_CYLINDER: -0.75
OS_ADD: +2.75
OD_AXIS: 65
OD_VPRISM_DIRECTION: PROGS
OS_SPHERE: +1.00
OD_ADD: +2.75
OD_OVR_VA: 20/
OD_SPHERE: +1.25
OD_OVR_VA: 20/
OS_AXIS: 172
OD_CYLINDER: -1.25

## 2018-10-25 ASSESSMENT — SPHEQUIV_DERIVED
OD_SPHEQUIV: 0
OS_SPHEQUIV: 0.75

## 2018-10-25 ASSESSMENT — REFRACTION_MANIFEST
OU_VA: 20/
OS_VA1: 20/
OD_VA2: 20/
OS_VA3: 20/
OS_VA3: 20/
OS_VA2: 20/
OD_VA3: 20/
OD_VA1: 20/
OU_VA: 20/
OD_VA3: 20/
OS_VA2: 20/
OS_VA1: 20/
OD_VA1: 20/
OD_VA2: 20/

## 2018-10-25 ASSESSMENT — REFRACTION_AUTOREFRACTION
OS_AXIS: 12
OD_AXIS: 41
OD_SPHERE: +0.25
OD_CYLINDER: -0.50
OS_CYLINDER: -0.50
OS_SPHERE: +1.00

## 2018-10-25 ASSESSMENT — VISUAL ACUITY
OD_BCVA: 20/60
OS_BCVA: 20/60+1

## 2018-10-25 ASSESSMENT — LID POSITION - DERMATOCHALASIS
OD_DERMATOCHALASIS: 1+
OS_DERMATOCHALASIS: 1+

## 2018-10-25 ASSESSMENT — SUPERFICIAL PUNCTATE KERATITIS (SPK)
OD_SPK: 2+
OS_SPK: 2+ 3+

## 2018-10-25 ASSESSMENT — LID POSITION - PTOSIS
OS_PTOSIS: 1+
OD_PTOSIS: 1+

## 2018-10-25 ASSESSMENT — LID EXAM ASSESSMENTS
OS_BLEPHARITIS: ABSENT
OD_BLEPHARITIS: ABSENT

## 2018-10-25 ASSESSMENT — DRY EYES - PHYSICIAN NOTES
OD_GENERALCOMMENTS: DIFFUSE
OS_GENERALCOMMENTS: DIFFUSE

## 2018-12-17 ENCOUNTER — RX ONLY (RX ONLY)
Age: 83
End: 2018-12-17

## 2018-12-17 ENCOUNTER — DOCTOR'S OFFICE (OUTPATIENT)
Dept: URBAN - NONMETROPOLITAN AREA CLINIC 1 | Facility: CLINIC | Age: 83
Setting detail: OPHTHALMOLOGY
End: 2018-12-17
Payer: COMMERCIAL

## 2018-12-17 DIAGNOSIS — H02.422: ICD-10-CM

## 2018-12-17 DIAGNOSIS — D21.0: ICD-10-CM

## 2018-12-17 DIAGNOSIS — H02.054: ICD-10-CM

## 2018-12-17 DIAGNOSIS — H02.051: ICD-10-CM

## 2018-12-17 DIAGNOSIS — H02.421: ICD-10-CM

## 2018-12-17 DIAGNOSIS — H04.123: ICD-10-CM

## 2018-12-17 PROBLEM — H01.001: Status: RESOLVED | Noted: 2017-03-13 | Resolved: 2018-12-17

## 2018-12-17 PROCEDURE — 92285 EXTERNAL OCULAR PHOTOGRAPHY: CPT | Performed by: OPHTHALMOLOGY

## 2018-12-17 PROCEDURE — 92012 INTRM OPH EXAM EST PATIENT: CPT | Performed by: OPHTHALMOLOGY

## 2018-12-17 PROCEDURE — 67820 REVISE EYELASHES: CPT | Performed by: OPHTHALMOLOGY

## 2018-12-17 ASSESSMENT — CONFRONTATIONAL VISUAL FIELD TEST (CVF)
OD_FINDINGS: FULL
OS_FINDINGS: FULL

## 2018-12-17 ASSESSMENT — REFRACTION_CURRENTRX
OD_CYLINDER: -1.25
OS_ADD: +2.75
OS_OVR_VA: 20/
OD_OVR_VA: 20/
OS_OVR_VA: 20/
OS_SPHERE: +1.00
OS_CYLINDER: -0.75
OD_AXIS: 65
OD_ADD: +2.75
OD_OVR_VA: 20/
OD_OVR_VA: 20/
OS_VPRISM_DIRECTION: PROGS
OS_OVR_VA: 20/
OS_AXIS: 172
OD_SPHERE: +1.25
OD_VPRISM_DIRECTION: PROGS

## 2018-12-17 ASSESSMENT — LID EXAM ASSESSMENTS
OS_TRICHIASIS: LUL T
OS_BLEPHARITIS: ABSENT
OD_BLEPHARITIS: ABSENT
OD_TRICHIASIS: RUL T 1+

## 2018-12-17 ASSESSMENT — REFRACTION_AUTOREFRACTION
OS_CYLINDER: -0.50
OD_SPHERE: +0.25
OS_SPHERE: +1.00
OS_AXIS: 12
OD_CYLINDER: -0.50
OD_AXIS: 41

## 2018-12-17 ASSESSMENT — LID POSITION - DERMATOCHALASIS
OD_DERMATOCHALASIS: 1+
OS_DERMATOCHALASIS: 1+

## 2018-12-17 ASSESSMENT — REFRACTION_MANIFEST
OD_VA3: 20/
OD_VA2: 20/
OS_VA3: 20/
OD_VA1: 20/
OS_VA2: 20/
OU_VA: 20/
OS_VA1: 20/
OD_VA2: 20/
OU_VA: 20/
OD_VA3: 20/
OS_VA1: 20/
OS_VA3: 20/
OD_VA1: 20/
OS_VA2: 20/

## 2018-12-17 ASSESSMENT — SUPERFICIAL PUNCTATE KERATITIS (SPK)
OS_SPK: 1+
OD_SPK: 1+

## 2018-12-17 ASSESSMENT — VISUAL ACUITY
OS_BCVA: 20/30-1
OD_BCVA: 20/25-2

## 2018-12-17 ASSESSMENT — SPHEQUIV_DERIVED
OD_SPHEQUIV: 0
OS_SPHEQUIV: 0.75

## 2018-12-17 ASSESSMENT — LID POSITION - PTOSIS
OD_PTOSIS: 1+
OS_PTOSIS: 1+

## 2018-12-17 ASSESSMENT — DRY EYES - PHYSICIAN NOTES
OD_GENERALCOMMENTS: DIFFUSE
OS_GENERALCOMMENTS: DIFFUSE

## 2019-01-14 ENCOUNTER — DOCTOR'S OFFICE (OUTPATIENT)
Dept: URBAN - NONMETROPOLITAN AREA CLINIC 1 | Facility: CLINIC | Age: 84
Setting detail: OPHTHALMOLOGY
End: 2019-01-14
Payer: COMMERCIAL

## 2019-01-14 DIAGNOSIS — H02.054: ICD-10-CM

## 2019-01-14 DIAGNOSIS — H02.051: ICD-10-CM

## 2019-01-14 DIAGNOSIS — H02.422: ICD-10-CM

## 2019-01-14 DIAGNOSIS — H02.831: ICD-10-CM

## 2019-01-14 DIAGNOSIS — H02.421: ICD-10-CM

## 2019-01-14 DIAGNOSIS — D23.122: ICD-10-CM

## 2019-01-14 DIAGNOSIS — H02.834: ICD-10-CM

## 2019-01-14 DIAGNOSIS — H04.123: ICD-10-CM

## 2019-01-14 DIAGNOSIS — H04.121: ICD-10-CM

## 2019-01-14 DIAGNOSIS — H04.122: ICD-10-CM

## 2019-01-14 DIAGNOSIS — M31.6: ICD-10-CM

## 2019-01-14 PROCEDURE — 92012 INTRM OPH EXAM EST PATIENT: CPT | Performed by: OPHTHALMOLOGY

## 2019-01-14 ASSESSMENT — REFRACTION_MANIFEST
OU_VA: 20/
OD_VA2: 20/
OD_VA1: 20/
OS_VA2: 20/
OS_VA2: 20/
OS_VA1: 20/
OS_VA1: 20/
OU_VA: 20/
OS_VA3: 20/
OD_VA2: 20/
OD_VA3: 20/
OD_VA3: 20/
OS_VA3: 20/
OD_VA1: 20/

## 2019-01-14 ASSESSMENT — REFRACTION_AUTOREFRACTION
OS_CYLINDER: -0.50
OS_AXIS: 12
OD_CYLINDER: -0.50
OD_AXIS: 41
OD_SPHERE: +0.25
OS_SPHERE: +1.00

## 2019-01-14 ASSESSMENT — DRY EYES - PHYSICIAN NOTES
OD_GENERALCOMMENTS: DIFFUSE
OS_GENERALCOMMENTS: DIFFUSE

## 2019-01-14 ASSESSMENT — REFRACTION_CURRENTRX
OD_ADD: +2.75
OD_VPRISM_DIRECTION: PROGS
OS_SPHERE: +1.00
OD_AXIS: 65
OS_ADD: +2.75
OD_OVR_VA: 20/
OS_OVR_VA: 20/
OD_OVR_VA: 20/
OS_OVR_VA: 20/
OS_OVR_VA: 20/
OD_SPHERE: +1.25
OS_AXIS: 172
OS_CYLINDER: -0.75
OD_CYLINDER: -1.25
OD_OVR_VA: 20/
OS_VPRISM_DIRECTION: PROGS

## 2019-01-14 ASSESSMENT — LID POSITION - DERMATOCHALASIS
OS_DERMATOCHALASIS: 1+
OD_DERMATOCHALASIS: 1+

## 2019-01-14 ASSESSMENT — LID EXAM ASSESSMENTS
OD_BLEPHARITIS: ABSENT
OS_BLEPHARITIS: ABSENT
OD_TRICHIASIS: RUL T 1+
OS_TRICHIASIS: LUL T

## 2019-01-14 ASSESSMENT — VISUAL ACUITY
OS_BCVA: 20/40
OD_BCVA: 20/30+2

## 2019-01-14 ASSESSMENT — SUPERFICIAL PUNCTATE KERATITIS (SPK)
OD_SPK: 2+
OS_SPK: 2+

## 2019-01-14 ASSESSMENT — LID POSITION - PTOSIS
OD_PTOSIS: 1+
OS_PTOSIS: 1+

## 2019-01-14 ASSESSMENT — SPHEQUIV_DERIVED
OD_SPHEQUIV: 0
OS_SPHEQUIV: 0.75

## 2019-01-14 ASSESSMENT — CONFRONTATIONAL VISUAL FIELD TEST (CVF)
OS_FINDINGS: FULL
OD_FINDINGS: FULL

## 2019-05-24 ENCOUNTER — DOCTOR'S OFFICE (OUTPATIENT)
Dept: URBAN - NONMETROPOLITAN AREA CLINIC 1 | Facility: CLINIC | Age: 84
Setting detail: OPHTHALMOLOGY
End: 2019-05-24
Payer: COMMERCIAL

## 2019-05-24 DIAGNOSIS — H04.122: ICD-10-CM

## 2019-05-24 DIAGNOSIS — H02.831: ICD-10-CM

## 2019-05-24 DIAGNOSIS — H02.054: ICD-10-CM

## 2019-05-24 DIAGNOSIS — H02.834: ICD-10-CM

## 2019-05-24 DIAGNOSIS — D23.122: ICD-10-CM

## 2019-05-24 DIAGNOSIS — H02.422: ICD-10-CM

## 2019-05-24 DIAGNOSIS — H02.421: ICD-10-CM

## 2019-05-24 DIAGNOSIS — H04.121: ICD-10-CM

## 2019-05-24 DIAGNOSIS — H04.123: ICD-10-CM

## 2019-05-24 DIAGNOSIS — H02.051: ICD-10-CM

## 2019-05-24 PROCEDURE — 92012 INTRM OPH EXAM EST PATIENT: CPT | Performed by: OPHTHALMOLOGY

## 2019-05-24 ASSESSMENT — REFRACTION_MANIFEST
OS_VA3: 20/
OU_VA: 20/
OU_VA: 20/
OD_VA2: 20/
OS_VA1: 20/
OS_VA2: 20/
OD_VA1: 20/
OD_VA1: 20/
OD_VA2: 20/
OD_VA3: 20/
OS_VA3: 20/
OS_VA2: 20/
OS_VA1: 20/
OD_VA3: 20/

## 2019-05-24 ASSESSMENT — LID POSITION - DERMATOCHALASIS
OS_DERMATOCHALASIS: 1+
OD_DERMATOCHALASIS: 1+

## 2019-05-24 ASSESSMENT — REFRACTION_CURRENTRX
OD_OVR_VA: 20/
OS_AXIS: 172
OS_VPRISM_DIRECTION: PROGS
OS_OVR_VA: 20/
OS_CYLINDER: -0.75
OS_OVR_VA: 20/
OD_OVR_VA: 20/
OD_ADD: +2.75
OD_VPRISM_DIRECTION: PROGS
OD_AXIS: 65
OD_OVR_VA: 20/
OD_SPHERE: +1.25
OD_CYLINDER: -1.25
OS_OVR_VA: 20/
OS_SPHERE: +1.00
OS_ADD: +2.75

## 2019-05-24 ASSESSMENT — SUPERFICIAL PUNCTATE KERATITIS (SPK)
OS_SPK: 1+ 2+
OD_SPK: 1+ 2+

## 2019-05-24 ASSESSMENT — LID EXAM ASSESSMENTS
OD_BLEPHARITIS: ABSENT
OS_BLEPHARITIS: ABSENT
OS_TRICHIASIS: LUL T
OD_TRICHIASIS: RUL T 1+

## 2019-05-24 ASSESSMENT — VISUAL ACUITY
OS_BCVA: 20/60-2
OD_BCVA: 20/50-2

## 2019-05-24 ASSESSMENT — REFRACTION_AUTOREFRACTION
OS_CYLINDER: -1.25
OD_AXIS: 113
OD_CYLINDER: -1.00
OD_SPHERE: +0.25
OS_AXIS: 027
OS_SPHERE: +1.00

## 2019-05-24 ASSESSMENT — DRY EYES - PHYSICIAN NOTES
OS_GENERALCOMMENTS: DIFFUSE
OD_GENERALCOMMENTS: DIFFUSE

## 2019-05-24 ASSESSMENT — SPHEQUIV_DERIVED
OD_SPHEQUIV: -0.25
OS_SPHEQUIV: 0.375

## 2019-05-24 ASSESSMENT — LID POSITION - PTOSIS
OS_PTOSIS: 1+
OD_PTOSIS: 1+

## 2019-07-26 ENCOUNTER — RX ONLY (RX ONLY)
Age: 84
End: 2019-07-26

## 2019-08-13 ENCOUNTER — DOCTOR'S OFFICE (OUTPATIENT)
Dept: URBAN - NONMETROPOLITAN AREA CLINIC 1 | Facility: CLINIC | Age: 84
Setting detail: OPHTHALMOLOGY
End: 2019-08-13
Payer: COMMERCIAL

## 2019-08-13 DIAGNOSIS — H04.122: ICD-10-CM

## 2019-08-13 DIAGNOSIS — H02.421: ICD-10-CM

## 2019-08-13 DIAGNOSIS — H02.834: ICD-10-CM

## 2019-08-13 DIAGNOSIS — H02.422: ICD-10-CM

## 2019-08-13 DIAGNOSIS — H04.121: ICD-10-CM

## 2019-08-13 DIAGNOSIS — H02.831: ICD-10-CM

## 2019-08-13 DIAGNOSIS — H04.123: ICD-10-CM

## 2019-08-13 DIAGNOSIS — D23.122: ICD-10-CM

## 2019-08-13 PROCEDURE — 92012 INTRM OPH EXAM EST PATIENT: CPT | Performed by: OPHTHALMOLOGY

## 2019-08-13 PROCEDURE — 92285 EXTERNAL OCULAR PHOTOGRAPHY: CPT | Performed by: OPHTHALMOLOGY

## 2019-08-13 PROCEDURE — 83861 MICROFLUID ANALY TEARS: CPT | Performed by: OPHTHALMOLOGY

## 2019-08-13 ASSESSMENT — REFRACTION_AUTOREFRACTION
OS_CYLINDER: -1.25
OD_AXIS: 113
OD_CYLINDER: -1.00
OS_SPHERE: +1.00
OS_AXIS: 027
OD_SPHERE: +0.25

## 2019-08-13 ASSESSMENT — REFRACTION_MANIFEST
OD_VA1: 20/
OU_VA: 20/
OD_VA2: 20/
OD_VA3: 20/
OS_VA3: 20/
OD_VA1: 20/
OD_VA3: 20/
OS_VA1: 20/
OD_VA2: 20/
OS_VA2: 20/
OS_VA1: 20/
OU_VA: 20/
OS_VA3: 20/
OS_VA2: 20/

## 2019-08-13 ASSESSMENT — LID POSITION - DERMATOCHALASIS
OS_DERMATOCHALASIS: 1+
OD_DERMATOCHALASIS: 1+

## 2019-08-13 ASSESSMENT — REFRACTION_CURRENTRX
OD_AXIS: 65
OS_OVR_VA: 20/
OD_SPHERE: +1.25
OD_OVR_VA: 20/
OS_SPHERE: +1.00
OS_OVR_VA: 20/
OD_CYLINDER: -1.25
OS_ADD: +2.75
OS_VPRISM_DIRECTION: PROGS
OD_OVR_VA: 20/
OS_AXIS: 172
OD_OVR_VA: 20/
OD_ADD: +2.75
OD_VPRISM_DIRECTION: PROGS
OS_OVR_VA: 20/
OS_CYLINDER: -0.75

## 2019-08-13 ASSESSMENT — SPHEQUIV_DERIVED
OS_SPHEQUIV: 0.375
OD_SPHEQUIV: -0.25

## 2019-08-13 ASSESSMENT — LID EXAM ASSESSMENTS
OS_BLEPHARITIS: ABSENT
OD_TRICHIASIS: RUL T 1+
OS_TRICHIASIS: LUL T
OD_BLEPHARITIS: ABSENT

## 2019-08-13 ASSESSMENT — SUPERFICIAL PUNCTATE KERATITIS (SPK)
OS_SPK: 1+ 2+
OD_SPK: 1+

## 2019-08-13 ASSESSMENT — DRY EYES - PHYSICIAN NOTES
OS_GENERALCOMMENTS: DIFFUSE
OD_GENERALCOMMENTS: DIFFUSE

## 2019-08-13 ASSESSMENT — LID POSITION - PTOSIS
OS_PTOSIS: 1+
OD_PTOSIS: 1+

## 2019-08-13 ASSESSMENT — VISUAL ACUITY
OD_BCVA: 20/30-2
OS_BCVA: 20/40-1

## 2019-08-13 ASSESSMENT — CONFRONTATIONAL VISUAL FIELD TEST (CVF)
OD_FINDINGS: FULL
OS_FINDINGS: FULL

## 2019-08-27 ENCOUNTER — AMBUL SURGICAL CARE (OUTPATIENT)
Dept: URBAN - NONMETROPOLITAN AREA SURGERY 1 | Facility: SURGERY | Age: 84
Setting detail: OPHTHALMOLOGY
End: 2019-08-27
Payer: COMMERCIAL

## 2019-08-27 DIAGNOSIS — C44.1192: ICD-10-CM

## 2019-08-27 DIAGNOSIS — D23.12: ICD-10-CM

## 2019-08-27 PROCEDURE — 67840 REMOVE EYELID LESION: CPT | Performed by: OPHTHALMOLOGY

## 2019-08-27 PROCEDURE — G8918 PT W/O PREOP ORDER IV AB PRO: HCPCS | Performed by: OPHTHALMOLOGY

## 2019-08-27 PROCEDURE — G8907 PT DOC NO EVENTS ON DISCHARG: HCPCS | Performed by: OPHTHALMOLOGY

## 2019-08-27 PROCEDURE — 67810 INCAL BX EYELID SKN LID MRGN: CPT | Performed by: OPHTHALMOLOGY

## 2019-09-03 ENCOUNTER — DOCTOR'S OFFICE (OUTPATIENT)
Dept: URBAN - NONMETROPOLITAN AREA CLINIC 1 | Facility: CLINIC | Age: 84
Setting detail: OPHTHALMOLOGY
End: 2019-09-03
Payer: COMMERCIAL

## 2019-09-03 DIAGNOSIS — H04.123: ICD-10-CM

## 2019-09-03 DIAGNOSIS — H02.432: ICD-10-CM

## 2019-09-03 DIAGNOSIS — H02.412: ICD-10-CM

## 2019-09-03 DIAGNOSIS — H02.422: ICD-10-CM

## 2019-09-03 DIAGNOSIS — H04.121: ICD-10-CM

## 2019-09-03 DIAGNOSIS — D23.122: ICD-10-CM

## 2019-09-03 DIAGNOSIS — H02.055: ICD-10-CM

## 2019-09-03 DIAGNOSIS — H04.122: ICD-10-CM

## 2019-09-03 DIAGNOSIS — H02.402: ICD-10-CM

## 2019-09-03 PROCEDURE — 67820 REVISE EYELASHES: CPT | Performed by: PHYSICIAN ASSISTANT

## 2019-09-03 PROCEDURE — 99024 POSTOP FOLLOW-UP VISIT: CPT | Performed by: PHYSICIAN ASSISTANT

## 2019-09-03 ASSESSMENT — SUPERFICIAL PUNCTATE KERATITIS (SPK)
OS_SPK: 1+ 2+
OD_SPK: 1+

## 2019-09-03 ASSESSMENT — LID POSITION - PTOSIS
OD_PTOSIS: 1+
OS_PTOSIS: 1+

## 2019-09-03 ASSESSMENT — DRY EYES - PHYSICIAN NOTES
OD_GENERALCOMMENTS: DIFFUSE
OS_GENERALCOMMENTS: DIFFUSE

## 2019-09-03 ASSESSMENT — LID EXAM ASSESSMENTS
OS_BLEPHARITIS: ABSENT
OD_TRICHIASIS: RUL T 1+
OS_TRICHIASIS: LLL T
OD_BLEPHARITIS: ABSENT

## 2019-09-03 ASSESSMENT — LID POSITION - DERMATOCHALASIS
OS_DERMATOCHALASIS: 1+
OD_DERMATOCHALASIS: 1+

## 2019-09-04 ASSESSMENT — REFRACTION_MANIFEST
OS_VA3: 20/
OS_VA2: 20/
OU_VA: 20/
OS_VA2: 20/
OD_VA1: 20/
OD_VA3: 20/
OS_VA1: 20/
OS_VA3: 20/
OD_VA3: 20/
OD_VA2: 20/
OD_VA1: 20/
OS_VA1: 20/
OD_VA2: 20/
OU_VA: 20/

## 2019-09-04 ASSESSMENT — REFRACTION_AUTOREFRACTION
OD_SPHERE: +0.25
OS_AXIS: 002
OD_AXIS: 135
OS_CYLINDER: -1.00
OD_CYLINDER: -0.75
OS_SPHERE: +0.75

## 2019-09-04 ASSESSMENT — VISUAL ACUITY
OD_BCVA: 20/40-1
OS_BCVA: 20/25-2

## 2019-09-04 ASSESSMENT — REFRACTION_CURRENTRX
OD_CYLINDER: -1.25
OS_SPHERE: +1.00
OS_OVR_VA: 20/
OD_VPRISM_DIRECTION: PROGS
OD_OVR_VA: 20/
OS_OVR_VA: 20/
OD_AXIS: 65
OD_SPHERE: +1.25
OS_VPRISM_DIRECTION: PROGS
OS_ADD: +2.75
OS_CYLINDER: -0.75
OS_AXIS: 172
OD_OVR_VA: 20/
OD_OVR_VA: 20/
OS_OVR_VA: 20/
OD_ADD: +2.75

## 2019-09-04 ASSESSMENT — SPHEQUIV_DERIVED
OS_SPHEQUIV: 0.25
OD_SPHEQUIV: -0.125

## 2020-05-22 ENCOUNTER — DOCTOR'S OFFICE (OUTPATIENT)
Dept: URBAN - NONMETROPOLITAN AREA CLINIC 1 | Facility: CLINIC | Age: 85
Setting detail: OPHTHALMOLOGY
End: 2020-05-22
Payer: COMMERCIAL

## 2020-05-22 VITALS — HEIGHT: 55 IN

## 2020-05-22 DIAGNOSIS — Z79.891: ICD-10-CM

## 2020-05-22 DIAGNOSIS — H04.121: ICD-10-CM

## 2020-05-22 DIAGNOSIS — H04.123: ICD-10-CM

## 2020-05-22 DIAGNOSIS — D23.122: ICD-10-CM

## 2020-05-22 DIAGNOSIS — H01.005: ICD-10-CM

## 2020-05-22 DIAGNOSIS — H01.002: ICD-10-CM

## 2020-05-22 DIAGNOSIS — H01.004: ICD-10-CM

## 2020-05-22 DIAGNOSIS — H01.001: ICD-10-CM

## 2020-05-22 DIAGNOSIS — H04.122: ICD-10-CM

## 2020-05-22 PROCEDURE — 99213 OFFICE O/P EST LOW 20 MIN: CPT | Performed by: OPHTHALMOLOGY

## 2020-05-22 ASSESSMENT — DRY EYES - PHYSICIAN NOTES
OS_GENERALCOMMENTS: DIFFUSE
OD_GENERALCOMMENTS: DIFFUSE

## 2020-05-22 ASSESSMENT — LID POSITION - PTOSIS
OS_PTOSIS: 1+
OD_PTOSIS: 1+

## 2020-05-22 ASSESSMENT — VISUAL ACUITY
OD_BCVA: 20/60-2
OS_BCVA: 20/25-2

## 2020-05-22 ASSESSMENT — SPHEQUIV_DERIVED
OS_SPHEQUIV: 0.25
OD_SPHEQUIV: -0.125

## 2020-05-22 ASSESSMENT — LID EXAM ASSESSMENTS
OS_TRICHIASIS: ABSENT
OD_BLEPHARITIS: RLL RUL T 1+
OD_TRICHIASIS: ABSENT
OS_BLEPHARITIS: LLL LUL 1+

## 2020-05-22 ASSESSMENT — REFRACTION_CURRENTRX
OD_VPRISM_DIRECTION: PROGS
OD_ADD: +2.75
OS_AXIS: 172
OS_OVR_VA: 20/
OS_VPRISM_DIRECTION: PROGS
OD_OVR_VA: 20/
OS_SPHERE: +1.00
OD_CYLINDER: -1.25
OS_ADD: +2.75
OS_CYLINDER: -0.75
OD_AXIS: 65
OD_SPHERE: +1.25

## 2020-05-22 ASSESSMENT — CONFRONTATIONAL VISUAL FIELD TEST (CVF)
OD_FINDINGS: FULL
OS_FINDINGS: FULL

## 2020-05-22 ASSESSMENT — SUPERFICIAL PUNCTATE KERATITIS (SPK)
OD_SPK: 1+ 2+
OS_SPK: 2+

## 2020-05-22 ASSESSMENT — REFRACTION_AUTOREFRACTION
OD_CYLINDER: -0.75
OD_SPHERE: +0.25
OD_AXIS: 135
OS_SPHERE: +0.75
OS_AXIS: 002
OS_CYLINDER: -1.00

## 2020-05-22 ASSESSMENT — LID POSITION - DERMATOCHALASIS
OS_DERMATOCHALASIS: 1+
OD_DERMATOCHALASIS: 1+

## 2020-09-25 ENCOUNTER — DOCTOR'S OFFICE (OUTPATIENT)
Dept: URBAN - NONMETROPOLITAN AREA CLINIC 1 | Facility: CLINIC | Age: 85
Setting detail: OPHTHALMOLOGY
End: 2020-09-25
Payer: COMMERCIAL

## 2020-09-25 ENCOUNTER — RX ONLY (RX ONLY)
Age: 85
End: 2020-09-25

## 2020-09-25 DIAGNOSIS — H01.005: ICD-10-CM

## 2020-09-25 DIAGNOSIS — H01.004: ICD-10-CM

## 2020-09-25 DIAGNOSIS — H04.122: ICD-10-CM

## 2020-09-25 DIAGNOSIS — H43.812: ICD-10-CM

## 2020-09-25 DIAGNOSIS — H43.813: ICD-10-CM

## 2020-09-25 DIAGNOSIS — H04.123: ICD-10-CM

## 2020-09-25 DIAGNOSIS — D23.122: ICD-10-CM

## 2020-09-25 DIAGNOSIS — H01.002: ICD-10-CM

## 2020-09-25 DIAGNOSIS — H43.811: ICD-10-CM

## 2020-09-25 DIAGNOSIS — H04.121: ICD-10-CM

## 2020-09-25 DIAGNOSIS — H01.001: ICD-10-CM

## 2020-09-25 DIAGNOSIS — Z79.891: ICD-10-CM

## 2020-09-25 PROCEDURE — 92014 COMPRE OPH EXAM EST PT 1/>: CPT | Performed by: OPHTHALMOLOGY

## 2020-09-25 ASSESSMENT — SUPERFICIAL PUNCTATE KERATITIS (SPK)
OS_SPK: 2+
OD_SPK: 2+

## 2020-09-25 ASSESSMENT — REFRACTION_CURRENTRX
OD_VPRISM_DIRECTION: PROGS
OD_SPHERE: +1.25
OS_SPHERE: +1.00
OS_CYLINDER: -0.75
OS_AXIS: 172
OD_ADD: +2.75
OS_OVR_VA: 20/
OD_AXIS: 65
OS_ADD: +2.75
OD_OVR_VA: 20/
OD_CYLINDER: -1.25
OS_VPRISM_DIRECTION: PROGS

## 2020-09-25 ASSESSMENT — LID EXAM ASSESSMENTS
OD_TRICHIASIS: ABSENT
OD_BLEPHARITIS: RLL RUL T
OS_BLEPHARITIS: LLL LUL 1+
OS_TRICHIASIS: ABSENT

## 2020-09-25 ASSESSMENT — VISUAL ACUITY
OD_BCVA: 20/80-1
OS_BCVA: 20/50-1

## 2020-09-25 ASSESSMENT — LID POSITION - DERMATOCHALASIS
OD_DERMATOCHALASIS: 1+
OS_DERMATOCHALASIS: 1+

## 2020-09-25 ASSESSMENT — REFRACTION_AUTOREFRACTION
OD_CYLINDER: -1.00
OS_AXIS: 010
OS_SPHERE: +0.75
OD_AXIS: 085
OD_SPHERE: +0.25
OS_CYLINDER: -0.75

## 2020-09-25 ASSESSMENT — SPHEQUIV_DERIVED
OD_SPHEQUIV: -0.25
OS_SPHEQUIV: 0.375

## 2020-09-25 ASSESSMENT — CONFRONTATIONAL VISUAL FIELD TEST (CVF)
OS_FINDINGS: FULL
OD_FINDINGS: FULL

## 2020-09-25 ASSESSMENT — LID POSITION - PTOSIS: OS_PTOSIS: 1+

## 2020-09-25 ASSESSMENT — DRY EYES - PHYSICIAN NOTES
OS_GENERALCOMMENTS: DIFFUSE
OD_GENERALCOMMENTS: DIFFUSE

## 2020-12-22 ENCOUNTER — RX ONLY (RX ONLY)
Age: 85
End: 2020-12-22

## 2020-12-22 RX ORDER — CYCLOSPORINE 0.5 MG/ML
EMULSION OPHTHALMIC
Qty: 6 | Refills: 3 | Status: ACTIVE | OUTPATIENT

## 2021-02-03 ENCOUNTER — RX ONLY (RX ONLY)
Age: 86
End: 2021-02-03

## 2021-02-03 RX ORDER — CYCLOSPORINE 0.5 MG/ML
EMULSION OPHTHALMIC
Qty: 180 | Refills: 3 | Status: ACTIVE | OUTPATIENT

## 2021-02-26 ENCOUNTER — DOCTOR'S OFFICE (OUTPATIENT)
Dept: URBAN - NONMETROPOLITAN AREA CLINIC 1 | Facility: CLINIC | Age: 86
Setting detail: OPHTHALMOLOGY
End: 2021-02-26
Payer: COMMERCIAL

## 2021-02-26 DIAGNOSIS — H01.002: ICD-10-CM

## 2021-02-26 DIAGNOSIS — H01.001: ICD-10-CM

## 2021-02-26 DIAGNOSIS — Z79.891: ICD-10-CM

## 2021-02-26 DIAGNOSIS — D23.122: ICD-10-CM

## 2021-02-26 DIAGNOSIS — H01.005: ICD-10-CM

## 2021-02-26 DIAGNOSIS — H10.11: ICD-10-CM

## 2021-02-26 DIAGNOSIS — H01.004: ICD-10-CM

## 2021-02-26 DIAGNOSIS — H04.121: ICD-10-CM

## 2021-02-26 DIAGNOSIS — H04.122: ICD-10-CM

## 2021-02-26 DIAGNOSIS — H10.12: ICD-10-CM

## 2021-02-26 PROCEDURE — 83861 MICROFLUID ANALY TEARS: CPT | Performed by: OPHTHALMOLOGY

## 2021-02-26 PROCEDURE — 92012 INTRM OPH EXAM EST PATIENT: CPT | Performed by: OPHTHALMOLOGY

## 2021-02-26 ASSESSMENT — REFRACTION_CURRENTRX
OD_AXIS: 65
OD_OVR_VA: 20/
OS_AXIS: 172
OD_VPRISM_DIRECTION: PROGS
OS_ADD: +2.75
OS_CYLINDER: -0.75
OS_SPHERE: +1.00
OD_CYLINDER: -1.25
OS_VPRISM_DIRECTION: PROGS
OD_SPHERE: +1.25
OS_OVR_VA: 20/
OD_ADD: +2.75

## 2021-02-26 ASSESSMENT — REFRACTION_AUTOREFRACTION
OS_CYLINDER: -2.25
OS_SPHERE: +0.50
OD_SPHERE: 0.00
OS_AXIS: 018
OD_AXIS: 085
OD_CYLINDER: -0.50

## 2021-02-26 ASSESSMENT — LID EXAM ASSESSMENTS
OS_BLEPHARITIS: LLL LUL 1+
OD_MEIBOMITIS: RLL RUL T
OS_MEIBOMITIS: LLL LUL 1+
OD_TRICHIASIS: ABSENT
OS_TRICHIASIS: ABSENT
OD_BLEPHARITIS: RLL RUL T

## 2021-02-26 ASSESSMENT — SPHEQUIV_DERIVED
OD_SPHEQUIV: -0.25
OS_SPHEQUIV: -0.625

## 2021-02-26 ASSESSMENT — LID POSITION - DERMATOCHALASIS
OS_DERMATOCHALASIS: LUL 1+
OD_DERMATOCHALASIS: RUL 1+

## 2021-02-26 ASSESSMENT — SUPERFICIAL PUNCTATE KERATITIS (SPK)
OD_SPK: 2+
OS_SPK: 2+ 3+

## 2021-02-26 ASSESSMENT — DRY EYES - PHYSICIAN NOTES
OS_GENERALCOMMENTS: DIFFUSE
OD_GENERALCOMMENTS: DIFFUSE

## 2021-02-26 ASSESSMENT — VISUAL ACUITY
OS_BCVA: 20/60-2
OD_BCVA: 20/150

## 2021-02-26 ASSESSMENT — CONFRONTATIONAL VISUAL FIELD TEST (CVF)
OD_FINDINGS: FULL
OS_FINDINGS: FULL

## 2021-02-26 ASSESSMENT — LID POSITION - PTOSIS: OS_PTOSIS: LUL 1+

## 2021-04-23 ENCOUNTER — RX ONLY (RX ONLY)
Age: 86
End: 2021-04-23

## 2021-04-23 ENCOUNTER — DOCTOR'S OFFICE (OUTPATIENT)
Dept: URBAN - NONMETROPOLITAN AREA CLINIC 1 | Facility: CLINIC | Age: 86
Setting detail: OPHTHALMOLOGY
End: 2021-04-23
Payer: COMMERCIAL

## 2021-04-23 DIAGNOSIS — H01.002: ICD-10-CM

## 2021-04-23 DIAGNOSIS — H04.123: ICD-10-CM

## 2021-04-23 DIAGNOSIS — H01.004: ICD-10-CM

## 2021-04-23 DIAGNOSIS — H01.001: ICD-10-CM

## 2021-04-23 PROCEDURE — 92012 INTRM OPH EXAM EST PATIENT: CPT | Performed by: OPHTHALMOLOGY

## 2021-04-23 ASSESSMENT — CONFRONTATIONAL VISUAL FIELD TEST (CVF)
OD_FINDINGS: FULL
OS_FINDINGS: FULL

## 2021-04-23 ASSESSMENT — REFRACTION_CURRENTRX
OD_VPRISM_DIRECTION: PROGS
OS_CYLINDER: -0.75
OS_VPRISM_DIRECTION: PROGS
OD_AXIS: 65
OS_AXIS: 172
OS_ADD: +2.75
OD_CYLINDER: -1.25
OS_OVR_VA: 20/
OD_OVR_VA: 20/
OD_ADD: +2.75
OD_SPHERE: +1.25
OS_SPHERE: +1.00

## 2021-04-23 ASSESSMENT — VISUAL ACUITY
OD_BCVA: 20/100+1
OS_BCVA: 20/40-2

## 2021-04-23 ASSESSMENT — REFRACTION_AUTOREFRACTION
OD_SPHERE: 0.00
OS_AXIS: 018
OS_CYLINDER: -2.25
OD_CYLINDER: -0.50
OD_AXIS: 085
OS_SPHERE: +0.50

## 2021-04-23 ASSESSMENT — LID POSITION - DERMATOCHALASIS
OS_DERMATOCHALASIS: LUL 1+
OD_DERMATOCHALASIS: RUL 1+

## 2021-04-23 ASSESSMENT — SUPERFICIAL PUNCTATE KERATITIS (SPK)
OD_SPK: 1+ 2+
OS_SPK: 2+

## 2021-04-23 ASSESSMENT — SPHEQUIV_DERIVED
OD_SPHEQUIV: -0.25
OS_SPHEQUIV: -0.625

## 2021-04-23 ASSESSMENT — LID EXAM ASSESSMENTS
OD_MEIBOMITIS: ABSENT
OS_BLEPHARITIS: LLL LUL T
OD_BLEPHARITIS: RLL RUL T

## 2021-04-23 ASSESSMENT — LID POSITION - PTOSIS: OS_PTOSIS: LUL 1+

## 2021-09-10 ENCOUNTER — DOCTOR'S OFFICE (OUTPATIENT)
Dept: URBAN - NONMETROPOLITAN AREA CLINIC 1 | Facility: CLINIC | Age: 86
Setting detail: OPHTHALMOLOGY
End: 2021-09-10
Payer: COMMERCIAL

## 2021-09-10 DIAGNOSIS — H04.122: ICD-10-CM

## 2021-09-10 DIAGNOSIS — H01.005: ICD-10-CM

## 2021-09-10 DIAGNOSIS — H02.88B: ICD-10-CM

## 2021-09-10 DIAGNOSIS — H10.11: ICD-10-CM

## 2021-09-10 DIAGNOSIS — D23.122: ICD-10-CM

## 2021-09-10 DIAGNOSIS — H04.121: ICD-10-CM

## 2021-09-10 DIAGNOSIS — H01.004: ICD-10-CM

## 2021-09-10 DIAGNOSIS — H01.001: ICD-10-CM

## 2021-09-10 DIAGNOSIS — H10.12: ICD-10-CM

## 2021-09-10 DIAGNOSIS — H04.123: ICD-10-CM

## 2021-09-10 DIAGNOSIS — H01.002: ICD-10-CM

## 2021-09-10 DIAGNOSIS — H02.055: ICD-10-CM

## 2021-09-10 PROCEDURE — 83861 MICROFLUID ANALY TEARS: CPT | Performed by: OPHTHALMOLOGY

## 2021-09-10 PROCEDURE — 92012 INTRM OPH EXAM EST PATIENT: CPT | Performed by: OPHTHALMOLOGY

## 2021-09-10 PROCEDURE — 67820 REVISE EYELASHES: CPT | Performed by: OPHTHALMOLOGY

## 2021-09-10 ASSESSMENT — LID POSITION - DERMATOCHALASIS
OS_DERMATOCHALASIS: LUL 1+
OD_DERMATOCHALASIS: RUL 1+

## 2021-09-10 ASSESSMENT — REFRACTION_CURRENTRX
OS_AXIS: 172
OS_OVR_VA: 20/
OS_ADD: +2.75
OS_VPRISM_DIRECTION: PROGS
OD_SPHERE: +1.25
OS_CYLINDER: -0.75
OS_SPHERE: +1.00
OD_OVR_VA: 20/
OD_AXIS: 65
OD_CYLINDER: -1.25
OD_ADD: +2.75
OD_VPRISM_DIRECTION: PROGS

## 2021-09-10 ASSESSMENT — CONFRONTATIONAL VISUAL FIELD TEST (CVF)
OS_FINDINGS: FULL
OD_FINDINGS: FULL

## 2021-09-10 ASSESSMENT — REFRACTION_AUTOREFRACTION
OS_CYLINDER: -3.00
OS_SPHERE: +0.50
OD_CYLINDER: -1.50
OS_AXIS: 174
OD_SPHERE: +0.75
OD_AXIS: 73

## 2021-09-10 ASSESSMENT — SPHEQUIV_DERIVED
OS_SPHEQUIV: -1
OD_SPHEQUIV: 0

## 2021-09-10 ASSESSMENT — LID EXAM ASSESSMENTS
OS_BLEPHARITIS: LLL LUL T
OD_MEIBOMITIS: ABSENT
OD_BLEPHARITIS: RLL RUL T

## 2021-09-10 ASSESSMENT — SUPERFICIAL PUNCTATE KERATITIS (SPK)
OD_SPK: 1+
OS_SPK: 1+

## 2021-09-10 ASSESSMENT — LID POSITION - PTOSIS: OS_PTOSIS: LUL 1+

## 2021-09-10 ASSESSMENT — VISUAL ACUITY
OS_BCVA: 20/30-2
OD_BCVA: 20/70-1

## 2022-01-28 ENCOUNTER — DOCTOR'S OFFICE (OUTPATIENT)
Dept: URBAN - NONMETROPOLITAN AREA CLINIC 1 | Facility: CLINIC | Age: 87
Setting detail: OPHTHALMOLOGY
End: 2022-01-28
Payer: COMMERCIAL

## 2022-01-28 DIAGNOSIS — H04.121: ICD-10-CM

## 2022-01-28 DIAGNOSIS — H02.014: ICD-10-CM

## 2022-01-28 DIAGNOSIS — H01.001: ICD-10-CM

## 2022-01-28 DIAGNOSIS — H43.813: ICD-10-CM

## 2022-01-28 DIAGNOSIS — H04.122: ICD-10-CM

## 2022-01-28 DIAGNOSIS — H10.9: ICD-10-CM

## 2022-01-28 DIAGNOSIS — H04.123: ICD-10-CM

## 2022-01-28 DIAGNOSIS — H02.015: ICD-10-CM

## 2022-01-28 PROCEDURE — 83861 MICROFLUID ANALY TEARS: CPT | Performed by: OPHTHALMOLOGY

## 2022-01-28 PROCEDURE — 99214 OFFICE O/P EST MOD 30 MIN: CPT | Performed by: OPHTHALMOLOGY

## 2022-01-28 PROCEDURE — 67820 REVISE EYELASHES: CPT | Performed by: OPHTHALMOLOGY

## 2022-01-28 PROCEDURE — 92134 CPTRZ OPH DX IMG PST SGM RTA: CPT | Performed by: OPHTHALMOLOGY

## 2022-01-28 ASSESSMENT — REFRACTION_CURRENTRX
OD_SPHERE: +1.25
OD_AXIS: 65
OS_AXIS: 172
OD_CYLINDER: -1.25
OS_OVR_VA: 20/
OS_ADD: +2.75
OS_VPRISM_DIRECTION: PROGS
OS_SPHERE: +1.00
OD_VPRISM_DIRECTION: PROGS
OD_ADD: +2.75
OS_CYLINDER: -0.75
OD_OVR_VA: 20/

## 2022-01-28 ASSESSMENT — REFRACTION_AUTOREFRACTION
OD_AXIS: 091
OD_SPHERE: 0.00
OS_AXIS: 174
OD_CYLINDER: -0.50
OS_SPHERE: +0.50
OS_CYLINDER: -3.00

## 2022-01-28 ASSESSMENT — LID EXAM ASSESSMENTS
OD_BLEPHARITIS: RLL RUL T
OS_TRICHIASIS: LLL T
OD_MEIBOMITIS: ABSENT
OS_BLEPHARITIS: LLL LUL T

## 2022-01-28 ASSESSMENT — LID POSITION - DERMATOCHALASIS
OD_DERMATOCHALASIS: RUL 1+
OS_DERMATOCHALASIS: LUL 1+

## 2022-01-28 ASSESSMENT — SUPERFICIAL PUNCTATE KERATITIS (SPK)
OD_SPK: ABSENT
OS_SPK: ABSENT

## 2022-01-28 ASSESSMENT — CONFRONTATIONAL VISUAL FIELD TEST (CVF)
OS_FINDINGS: FULL
OD_FINDINGS: FULL

## 2022-01-28 ASSESSMENT — VISUAL ACUITY
OS_BCVA: 20/50-2
OD_BCVA: 20/150

## 2022-01-28 ASSESSMENT — SPHEQUIV_DERIVED
OD_SPHEQUIV: -0.25
OS_SPHEQUIV: -1

## 2022-01-28 ASSESSMENT — LID POSITION - PTOSIS: OS_PTOSIS: LUL 1+

## 2022-02-02 ENCOUNTER — RX ONLY (RX ONLY)
Age: 87
End: 2022-02-02

## 2022-02-02 ENCOUNTER — DOCTOR'S OFFICE (OUTPATIENT)
Dept: URBAN - NONMETROPOLITAN AREA CLINIC 1 | Facility: CLINIC | Age: 87
Setting detail: OPHTHALMOLOGY
End: 2022-02-02
Payer: COMMERCIAL

## 2022-02-02 DIAGNOSIS — H10.9: ICD-10-CM

## 2022-02-02 PROCEDURE — 99213 OFFICE O/P EST LOW 20 MIN: CPT | Performed by: OPHTHALMOLOGY

## 2022-02-02 ASSESSMENT — SPHEQUIV_DERIVED
OS_SPHEQUIV: -1
OD_SPHEQUIV: -0.25

## 2022-02-02 ASSESSMENT — SUPERFICIAL PUNCTATE KERATITIS (SPK)
OS_SPK: ABSENT
OD_SPK: ABSENT

## 2022-02-02 ASSESSMENT — LID POSITION - DERMATOCHALASIS
OD_DERMATOCHALASIS: RUL 1+
OS_DERMATOCHALASIS: LUL 1+

## 2022-02-02 ASSESSMENT — REFRACTION_CURRENTRX
OD_ADD: +2.75
OD_AXIS: 65
OS_CYLINDER: -0.75
OD_CYLINDER: -1.25
OS_AXIS: 172
OD_OVR_VA: 20/
OS_ADD: +2.75
OS_SPHERE: +1.00
OS_VPRISM_DIRECTION: PROGS
OD_SPHERE: +1.25
OS_OVR_VA: 20/
OD_VPRISM_DIRECTION: PROGS

## 2022-02-02 ASSESSMENT — REFRACTION_AUTOREFRACTION
OD_CYLINDER: -0.50
OD_SPHERE: 0.00
OD_AXIS: 091
OS_CYLINDER: -3.00
OS_AXIS: 174
OS_SPHERE: +0.50

## 2022-02-02 ASSESSMENT — LID EXAM ASSESSMENTS
OD_BLEPHARITIS: RLL RUL T
OD_MEIBOMITIS: ABSENT
OS_TRICHIASIS: LLL T
OS_BLEPHARITIS: LLL LUL T

## 2022-02-02 ASSESSMENT — CONFRONTATIONAL VISUAL FIELD TEST (CVF)
OS_FINDINGS: FULL
OD_FINDINGS: FULL

## 2022-02-02 ASSESSMENT — VISUAL ACUITY
OD_BCVA: 20/150
OS_BCVA: 20/60-2

## 2022-02-02 ASSESSMENT — LID POSITION - PTOSIS: OS_PTOSIS: LUL 1+

## 2022-02-11 ENCOUNTER — DOCTOR'S OFFICE (OUTPATIENT)
Dept: URBAN - NONMETROPOLITAN AREA CLINIC 1 | Facility: CLINIC | Age: 87
Setting detail: OPHTHALMOLOGY
End: 2022-02-11
Payer: COMMERCIAL

## 2022-02-11 DIAGNOSIS — H01.004: ICD-10-CM

## 2022-02-11 DIAGNOSIS — H01.002: ICD-10-CM

## 2022-02-11 DIAGNOSIS — H02.014: ICD-10-CM

## 2022-02-11 DIAGNOSIS — H01.001: ICD-10-CM

## 2022-02-11 DIAGNOSIS — H02.015: ICD-10-CM

## 2022-02-11 DIAGNOSIS — H10.9: ICD-10-CM

## 2022-02-11 DIAGNOSIS — H01.005: ICD-10-CM

## 2022-02-11 PROCEDURE — 67820 REVISE EYELASHES: CPT | Performed by: OPHTHALMOLOGY

## 2022-02-11 PROCEDURE — 99213 OFFICE O/P EST LOW 20 MIN: CPT | Performed by: OPHTHALMOLOGY

## 2022-02-11 ASSESSMENT — REFRACTION_CURRENTRX
OS_AXIS: 172
OS_VPRISM_DIRECTION: PROGS
OS_CYLINDER: -0.75
OD_ADD: +2.75
OS_SPHERE: +1.00
OS_OVR_VA: 20/
OD_CYLINDER: -1.25
OD_VPRISM_DIRECTION: PROGS
OS_ADD: +2.75
OD_AXIS: 65
OD_SPHERE: +1.25
OD_OVR_VA: 20/

## 2022-02-11 ASSESSMENT — REFRACTION_AUTOREFRACTION
OD_SPHERE: 0.00
OS_SPHERE: +0.50
OS_CYLINDER: -3.00
OS_AXIS: 174
OD_CYLINDER: -0.50
OD_AXIS: 091

## 2022-02-11 ASSESSMENT — LID EXAM ASSESSMENTS
OD_BLEPHARITIS: RLL RUL T
OS_BLEPHARITIS: LLL LUL T
OD_MEIBOMITIS: ABSENT
OS_TRICHIASIS: LLL T

## 2022-02-11 ASSESSMENT — SUPERFICIAL PUNCTATE KERATITIS (SPK)
OD_SPK: ABSENT
OS_SPK: ABSENT

## 2022-02-11 ASSESSMENT — LID POSITION - DERMATOCHALASIS
OD_DERMATOCHALASIS: RUL 1+
OS_DERMATOCHALASIS: LUL 1+

## 2022-02-11 ASSESSMENT — CONFRONTATIONAL VISUAL FIELD TEST (CVF)
OS_FINDINGS: FULL
OD_FINDINGS: FULL

## 2022-02-11 ASSESSMENT — SPHEQUIV_DERIVED
OS_SPHEQUIV: -1
OD_SPHEQUIV: -0.25

## 2022-02-11 ASSESSMENT — LID POSITION - PTOSIS: OS_PTOSIS: LUL 1+

## 2022-02-11 ASSESSMENT — VISUAL ACUITY
OS_BCVA: 20/60-1
OD_BCVA: 20/80-1

## 2022-03-01 ENCOUNTER — DOCTOR'S OFFICE (OUTPATIENT)
Dept: URBAN - NONMETROPOLITAN AREA CLINIC 1 | Facility: CLINIC | Age: 87
Setting detail: OPHTHALMOLOGY
End: 2022-03-01
Payer: COMMERCIAL

## 2022-03-01 ENCOUNTER — RX ONLY (RX ONLY)
Age: 87
End: 2022-03-01

## 2022-03-01 VITALS — HEIGHT: 55 IN

## 2022-03-01 DIAGNOSIS — H01.002: ICD-10-CM

## 2022-03-01 DIAGNOSIS — H10.9: ICD-10-CM

## 2022-03-01 DIAGNOSIS — H01.005: ICD-10-CM

## 2022-03-01 DIAGNOSIS — H01.001: ICD-10-CM

## 2022-03-01 DIAGNOSIS — H02.015: ICD-10-CM

## 2022-03-01 DIAGNOSIS — H01.004: ICD-10-CM

## 2022-03-01 DIAGNOSIS — H02.014: ICD-10-CM

## 2022-03-01 PROCEDURE — 99213 OFFICE O/P EST LOW 20 MIN: CPT | Performed by: OPHTHALMOLOGY

## 2022-03-01 ASSESSMENT — REFRACTION_CURRENTRX
OS_CYLINDER: -0.75
OS_AXIS: 172
OD_ADD: +2.75
OS_VPRISM_DIRECTION: PROGS
OD_VPRISM_DIRECTION: PROGS
OD_SPHERE: +1.25
OS_OVR_VA: 20/
OD_AXIS: 65
OS_ADD: +2.75
OD_CYLINDER: -1.25
OS_SPHERE: +1.00
OD_OVR_VA: 20/

## 2022-03-01 ASSESSMENT — CONFRONTATIONAL VISUAL FIELD TEST (CVF)
OS_FINDINGS: FULL
OD_FINDINGS: FULL

## 2022-03-01 ASSESSMENT — LID EXAM ASSESSMENTS
OS_BLEPHARITIS: LLL LUL
OD_BLEPHARITIS: RLL RUL
OS_TRICHIASIS: LLL T
OD_TRICHIASIS: RLL T

## 2022-03-01 ASSESSMENT — REFRACTION_AUTOREFRACTION
OS_AXIS: 174
OD_AXIS: 091
OD_CYLINDER: -0.50
OD_SPHERE: 0.00
OS_CYLINDER: -3.00
OS_SPHERE: +0.50

## 2022-03-01 ASSESSMENT — SPHEQUIV_DERIVED
OD_SPHEQUIV: -0.25
OS_SPHEQUIV: -1

## 2022-03-01 ASSESSMENT — LID POSITION - PTOSIS: OS_PTOSIS: LUL 1+

## 2022-03-01 ASSESSMENT — VISUAL ACUITY
OD_BCVA: 20/150-1
OS_BCVA: 20/60-2

## 2022-03-01 ASSESSMENT — SUPERFICIAL PUNCTATE KERATITIS (SPK)
OD_SPK: ABSENT
OS_SPK: ABSENT

## 2022-03-01 ASSESSMENT — LID POSITION - DERMATOCHALASIS
OD_DERMATOCHALASIS: RUL 1+
OS_DERMATOCHALASIS: LUL 1+

## 2022-03-11 ENCOUNTER — DOCTOR'S OFFICE (OUTPATIENT)
Dept: URBAN - NONMETROPOLITAN AREA CLINIC 1 | Facility: CLINIC | Age: 87
Setting detail: OPHTHALMOLOGY
End: 2022-03-11
Payer: COMMERCIAL

## 2022-03-11 ENCOUNTER — RX ONLY (RX ONLY)
Age: 87
End: 2022-03-11

## 2022-03-11 DIAGNOSIS — H01.001: ICD-10-CM

## 2022-03-11 DIAGNOSIS — H02.011: ICD-10-CM

## 2022-03-11 DIAGNOSIS — H01.004: ICD-10-CM

## 2022-03-11 DIAGNOSIS — H01.002: ICD-10-CM

## 2022-03-11 DIAGNOSIS — H02.012: ICD-10-CM

## 2022-03-11 DIAGNOSIS — H10.9: ICD-10-CM

## 2022-03-11 PROCEDURE — 99213 OFFICE O/P EST LOW 20 MIN: CPT | Performed by: OPHTHALMOLOGY

## 2022-03-11 PROCEDURE — 67820 REVISE EYELASHES: CPT | Performed by: OPHTHALMOLOGY

## 2022-03-11 ASSESSMENT — REFRACTION_CURRENTRX
OS_CYLINDER: -0.75
OD_SPHERE: +1.25
OD_CYLINDER: -1.25
OS_OVR_VA: 20/
OS_AXIS: 172
OD_OVR_VA: 20/
OS_SPHERE: +1.00
OS_ADD: +2.75
OD_VPRISM_DIRECTION: PROGS
OS_VPRISM_DIRECTION: PROGS
OD_ADD: +2.75
OD_AXIS: 65

## 2022-03-11 ASSESSMENT — REFRACTION_AUTOREFRACTION
OD_SPHERE: ERRORS
OS_SPHERE: ERRORS

## 2022-03-11 ASSESSMENT — LID POSITION - DERMATOCHALASIS
OS_DERMATOCHALASIS: LUL 1+
OD_DERMATOCHALASIS: RUL 1+

## 2022-03-11 ASSESSMENT — SUPERFICIAL PUNCTATE KERATITIS (SPK)
OD_SPK: ABSENT
OS_SPK: ABSENT

## 2022-03-11 ASSESSMENT — CONFRONTATIONAL VISUAL FIELD TEST (CVF)
OS_FINDINGS: FULL
OD_FINDINGS: FULL

## 2022-03-11 ASSESSMENT — VISUAL ACUITY
OS_BCVA: 20/150
OD_BCVA: 20/150

## 2022-03-11 ASSESSMENT — LID EXAM ASSESSMENTS
OD_TRICHIASIS: RLL RUL T 1+
OD_BLEPHARITIS: RLL RUL
OS_BLEPHARITIS: LLL LUL

## 2022-03-11 ASSESSMENT — LID POSITION - PTOSIS: OS_PTOSIS: LUL 1+

## 2022-03-14 ENCOUNTER — DOCTOR'S OFFICE (OUTPATIENT)
Dept: URBAN - NONMETROPOLITAN AREA CLINIC 1 | Facility: CLINIC | Age: 87
Setting detail: OPHTHALMOLOGY
End: 2022-03-14
Payer: COMMERCIAL

## 2022-03-14 DIAGNOSIS — H02.011: ICD-10-CM

## 2022-03-14 DIAGNOSIS — H02.014: ICD-10-CM

## 2022-03-14 DIAGNOSIS — H02.012: ICD-10-CM

## 2022-03-14 DIAGNOSIS — H10.9: ICD-10-CM

## 2022-03-14 PROCEDURE — 99213 OFFICE O/P EST LOW 20 MIN: CPT | Performed by: OPHTHALMOLOGY

## 2022-03-14 ASSESSMENT — REFRACTION_CURRENTRX
OS_CYLINDER: -0.75
OD_VPRISM_DIRECTION: PROGS
OS_OVR_VA: 20/
OD_OVR_VA: 20/
OD_CYLINDER: -1.25
OS_ADD: +2.75
OD_AXIS: 65
OD_ADD: +2.75
OS_SPHERE: +1.00
OD_SPHERE: +1.25
OS_AXIS: 172
OS_VPRISM_DIRECTION: PROGS

## 2022-03-14 ASSESSMENT — LID EXAM ASSESSMENTS
OD_BLEPHARITIS: RLL RUL
OS_BLEPHARITIS: LLL LUL

## 2022-03-14 ASSESSMENT — REFRACTION_AUTOREFRACTION
OS_CYLINDER: -0.25
OD_SPHERE: ERRORS
OS_SPHERE: +0.25
OS_AXIS: 44

## 2022-03-14 ASSESSMENT — SUPERFICIAL PUNCTATE KERATITIS (SPK)
OS_SPK: ABSENT
OD_SPK: ABSENT

## 2022-03-14 ASSESSMENT — CONFRONTATIONAL VISUAL FIELD TEST (CVF)
OD_FINDINGS: FULL
OS_FINDINGS: FULL

## 2022-03-14 ASSESSMENT — SPHEQUIV_DERIVED: OS_SPHEQUIV: 0.125

## 2022-03-14 ASSESSMENT — VISUAL ACUITY
OD_BCVA: 20/200+1
OS_BCVA: 20/150-1

## 2022-03-14 ASSESSMENT — LID POSITION - DERMATOCHALASIS
OS_DERMATOCHALASIS: LUL 1+
OD_DERMATOCHALASIS: RUL 1+

## 2022-03-14 ASSESSMENT — LID POSITION - PTOSIS: OS_PTOSIS: LUL 1+

## 2022-03-29 ENCOUNTER — RX ONLY (RX ONLY)
Age: 87
End: 2022-03-29

## 2022-03-29 ENCOUNTER — DOCTOR'S OFFICE (OUTPATIENT)
Dept: URBAN - NONMETROPOLITAN AREA CLINIC 1 | Facility: CLINIC | Age: 87
Setting detail: OPHTHALMOLOGY
End: 2022-03-29
Payer: COMMERCIAL

## 2022-03-29 DIAGNOSIS — H04.122: ICD-10-CM

## 2022-03-29 DIAGNOSIS — H04.121: ICD-10-CM

## 2022-03-29 DIAGNOSIS — H01.005: ICD-10-CM

## 2022-03-29 DIAGNOSIS — H01.001: ICD-10-CM

## 2022-03-29 DIAGNOSIS — H01.004: ICD-10-CM

## 2022-03-29 DIAGNOSIS — H01.002: ICD-10-CM

## 2022-03-29 DIAGNOSIS — H02.88B: ICD-10-CM

## 2022-03-29 PROCEDURE — 99213 OFFICE O/P EST LOW 20 MIN: CPT | Performed by: OPHTHALMOLOGY

## 2022-03-29 ASSESSMENT — REFRACTION_CURRENTRX
OS_CYLINDER: -0.75
OS_AXIS: 172
OS_OVR_VA: 20/
OD_SPHERE: +1.25
OD_ADD: +2.75
OS_ADD: +2.75
OD_OVR_VA: 20/
OD_AXIS: 65
OS_VPRISM_DIRECTION: PROGS
OD_VPRISM_DIRECTION: PROGS
OS_SPHERE: +1.00
OD_CYLINDER: -1.25

## 2022-03-29 ASSESSMENT — SUPERFICIAL PUNCTATE KERATITIS (SPK)
OD_SPK: ABSENT
OS_SPK: ABSENT

## 2022-03-29 ASSESSMENT — REFRACTION_AUTOREFRACTION
OS_AXIS: 44
OD_SPHERE: ERRORS
OS_SPHERE: +0.25
OS_CYLINDER: -0.25

## 2022-03-29 ASSESSMENT — SPHEQUIV_DERIVED: OS_SPHEQUIV: 0.125

## 2022-03-29 ASSESSMENT — VISUAL ACUITY
OD_BCVA: 20/200+1
OS_BCVA: 20/200+1

## 2022-03-29 ASSESSMENT — LID EXAM ASSESSMENTS
OS_BLEPHARITIS: LLL LUL
OD_BLEPHARITIS: RLL RUL

## 2022-03-29 ASSESSMENT — LID POSITION - PTOSIS: OS_PTOSIS: LUL 1+

## 2022-03-29 ASSESSMENT — LID POSITION - DERMATOCHALASIS
OD_DERMATOCHALASIS: RUL 1+
OS_DERMATOCHALASIS: LUL 1+

## 2022-05-09 ENCOUNTER — DOCTOR'S OFFICE (OUTPATIENT)
Dept: URBAN - NONMETROPOLITAN AREA CLINIC 1 | Facility: CLINIC | Age: 87
Setting detail: OPHTHALMOLOGY
End: 2022-05-09
Payer: COMMERCIAL

## 2022-05-09 DIAGNOSIS — H01.002: ICD-10-CM

## 2022-05-09 DIAGNOSIS — H01.005: ICD-10-CM

## 2022-05-09 DIAGNOSIS — H01.001: ICD-10-CM

## 2022-05-09 DIAGNOSIS — H01.004: ICD-10-CM

## 2022-05-09 PROCEDURE — 99213 OFFICE O/P EST LOW 20 MIN: CPT | Performed by: OPHTHALMOLOGY

## 2022-05-09 ASSESSMENT — VISUAL ACUITY
OS_BCVA: 20/200+1
OD_BCVA: 20/80+1

## 2022-05-09 ASSESSMENT — CONFRONTATIONAL VISUAL FIELD TEST (CVF)
OS_FINDINGS: FULL
OD_FINDINGS: FULL

## 2022-05-09 ASSESSMENT — REFRACTION_AUTOREFRACTION
OD_SPHERE: ERRORS
OS_CYLINDER: -0.25
OS_AXIS: 44
OS_SPHERE: +0.25

## 2022-05-09 ASSESSMENT — SPHEQUIV_DERIVED: OS_SPHEQUIV: 0.125

## 2022-05-09 ASSESSMENT — REFRACTION_CURRENTRX
OS_ADD: +2.75
OD_ADD: +2.75
OS_VPRISM_DIRECTION: PROGS
OD_OVR_VA: 20/
OD_CYLINDER: -1.25
OD_AXIS: 65
OS_OVR_VA: 20/
OS_SPHERE: +1.00
OD_VPRISM_DIRECTION: PROGS
OS_CYLINDER: -0.75
OD_SPHERE: +1.25
OS_AXIS: 172

## 2022-05-09 ASSESSMENT — LID POSITION - PTOSIS: OS_PTOSIS: LUL 1+

## 2022-05-09 ASSESSMENT — LID EXAM ASSESSMENTS
OD_BLEPHARITIS: RLL RUL
OS_BLEPHARITIS: LLL LUL

## 2022-05-09 ASSESSMENT — SUPERFICIAL PUNCTATE KERATITIS (SPK): OD_SPK: ABSENT

## 2022-05-09 ASSESSMENT — LID POSITION - DERMATOCHALASIS
OS_DERMATOCHALASIS: LUL 1+
OD_DERMATOCHALASIS: RUL 1+

## 2022-07-08 ENCOUNTER — RX ONLY (RX ONLY)
Age: 87
End: 2022-07-08

## 2022-07-08 ENCOUNTER — DOCTOR'S OFFICE (OUTPATIENT)
Dept: URBAN - NONMETROPOLITAN AREA CLINIC 1 | Facility: CLINIC | Age: 87
Setting detail: OPHTHALMOLOGY
End: 2022-07-08
Payer: COMMERCIAL

## 2022-07-08 DIAGNOSIS — H10.9: ICD-10-CM

## 2022-07-08 DIAGNOSIS — H01.004: ICD-10-CM

## 2022-07-08 DIAGNOSIS — H01.001: ICD-10-CM

## 2022-07-08 DIAGNOSIS — H02.88B: ICD-10-CM

## 2022-07-08 DIAGNOSIS — H01.005: ICD-10-CM

## 2022-07-08 DIAGNOSIS — H01.002: ICD-10-CM

## 2022-07-08 PROCEDURE — 99213 OFFICE O/P EST LOW 20 MIN: CPT | Performed by: OPHTHALMOLOGY

## 2022-07-08 ASSESSMENT — REFRACTION_AUTOREFRACTION
OS_SPHERE: +0.25
OD_SPHERE: ERRORS
OS_AXIS: 44
OS_CYLINDER: -0.25

## 2022-07-08 ASSESSMENT — VISUAL ACUITY
OD_BCVA: 20/80-1
OS_BCVA: 20/150

## 2022-07-08 ASSESSMENT — LID POSITION - DERMATOCHALASIS
OS_DERMATOCHALASIS: LUL 1+
OD_DERMATOCHALASIS: RUL 1+

## 2022-07-08 ASSESSMENT — REFRACTION_CURRENTRX
OD_VPRISM_DIRECTION: PROGS
OD_ADD: +2.75
OS_VPRISM_DIRECTION: PROGS
OS_AXIS: 172
OS_OVR_VA: 20/
OS_CYLINDER: -0.75
OD_CYLINDER: -1.25
OD_OVR_VA: 20/
OS_SPHERE: +1.00
OS_ADD: +2.75
OD_SPHERE: +1.25
OD_AXIS: 65

## 2022-07-08 ASSESSMENT — LID EXAM ASSESSMENTS
OD_TRICHIASIS: RLL RUL 1+
OS_BLEPHARITIS: LLL LUL
OS_TRICHIASIS: LLL LUL 1+
OD_BLEPHARITIS: RLL RUL

## 2022-07-08 ASSESSMENT — LID POSITION - PTOSIS: OS_PTOSIS: LUL 1+

## 2022-07-08 ASSESSMENT — SPHEQUIV_DERIVED: OS_SPHEQUIV: 0.125

## 2022-07-08 ASSESSMENT — SUPERFICIAL PUNCTATE KERATITIS (SPK): OD_SPK: ABSENT

## 2022-07-11 ENCOUNTER — RX ONLY (RX ONLY)
Age: 87
End: 2022-07-11

## 2022-07-11 ENCOUNTER — DOCTOR'S OFFICE (OUTPATIENT)
Dept: URBAN - NONMETROPOLITAN AREA CLINIC 1 | Facility: CLINIC | Age: 87
Setting detail: OPHTHALMOLOGY
End: 2022-07-11
Payer: COMMERCIAL

## 2022-07-11 DIAGNOSIS — H01.001: ICD-10-CM

## 2022-07-11 DIAGNOSIS — H01.002: ICD-10-CM

## 2022-07-11 DIAGNOSIS — G45.3: ICD-10-CM

## 2022-07-11 DIAGNOSIS — H10.9: ICD-10-CM

## 2022-07-11 PROCEDURE — 99213 OFFICE O/P EST LOW 20 MIN: CPT | Performed by: OPHTHALMOLOGY

## 2022-07-11 ASSESSMENT — LID EXAM ASSESSMENTS
OS_TRICHIASIS: LLL LUL 1+
OD_BLEPHARITIS: RLL RUL
OD_TRICHIASIS: RLL RUL 1+
OS_BLEPHARITIS: LLL LUL

## 2022-07-11 ASSESSMENT — CONFRONTATIONAL VISUAL FIELD TEST (CVF)
OD_FINDINGS: FULL
OS_FINDINGS: FULL

## 2022-07-11 ASSESSMENT — LID POSITION - DERMATOCHALASIS
OS_DERMATOCHALASIS: LUL 1+
OD_DERMATOCHALASIS: RUL 1+

## 2022-07-11 ASSESSMENT — LID POSITION - PTOSIS: OS_PTOSIS: LUL 1+

## 2022-07-11 ASSESSMENT — SUPERFICIAL PUNCTATE KERATITIS (SPK): OD_SPK: ABSENT

## 2022-07-14 ASSESSMENT — REFRACTION_CURRENTRX
OS_SPHERE: +1.00
OD_AXIS: 65
OD_CYLINDER: -1.25
OD_ADD: +2.75
OD_SPHERE: +1.25
OS_CYLINDER: -0.75
OD_OVR_VA: 20/
OD_VPRISM_DIRECTION: PROGS
OS_ADD: +2.75
OS_OVR_VA: 20/
OS_VPRISM_DIRECTION: PROGS
OS_AXIS: 172

## 2022-07-14 ASSESSMENT — REFRACTION_AUTOREFRACTION
OS_SPHERE: +0.25
OS_CYLINDER: -0.25
OS_AXIS: 44
OD_SPHERE: ERRORS

## 2022-07-14 ASSESSMENT — VISUAL ACUITY
OD_BCVA: 20/100-1
OS_BCVA: 20/400

## 2022-07-14 ASSESSMENT — SPHEQUIV_DERIVED: OS_SPHEQUIV: 0.125

## 2022-07-20 ENCOUNTER — DOCTOR'S OFFICE (OUTPATIENT)
Dept: URBAN - NONMETROPOLITAN AREA CLINIC 1 | Facility: CLINIC | Age: 87
Setting detail: OPHTHALMOLOGY
End: 2022-07-20
Payer: COMMERCIAL

## 2022-07-20 DIAGNOSIS — H10.9: ICD-10-CM

## 2022-07-20 DIAGNOSIS — H01.002: ICD-10-CM

## 2022-07-20 DIAGNOSIS — H02.012: ICD-10-CM

## 2022-07-20 DIAGNOSIS — H02.015: ICD-10-CM

## 2022-07-20 DIAGNOSIS — G45.3: ICD-10-CM

## 2022-07-20 DIAGNOSIS — H01.001: ICD-10-CM

## 2022-07-20 PROCEDURE — 67820 REVISE EYELASHES: CPT | Performed by: OPHTHALMOLOGY

## 2022-07-20 PROCEDURE — 99214 OFFICE O/P EST MOD 30 MIN: CPT | Performed by: OPHTHALMOLOGY

## 2022-07-20 ASSESSMENT — REFRACTION_CURRENTRX
OD_ADD: +2.75
OD_VPRISM_DIRECTION: PROGS
OS_OVR_VA: 20/
OS_VPRISM_DIRECTION: PROGS
OD_AXIS: 65
OS_ADD: +2.75
OD_SPHERE: +1.25
OS_AXIS: 172
OD_OVR_VA: 20/
OD_CYLINDER: -1.25
OS_SPHERE: +1.00
OS_CYLINDER: -0.75

## 2022-07-20 ASSESSMENT — LID EXAM ASSESSMENTS
OS_BLEPHARITIS: LLL LUL
OD_TRICHIASIS: RLL RUL 1+
OS_TRICHIASIS: LLL LUL 1+
OD_BLEPHARITIS: RLL RUL

## 2022-07-20 ASSESSMENT — REFRACTION_AUTOREFRACTION
OS_SPHERE: +2.25
OS_CYLINDER: -2.00
OD_CYLINDER: -1.25
OD_SPHERE: 0.00
OD_AXIS: 117
OS_AXIS: 076

## 2022-07-20 ASSESSMENT — SUPERFICIAL PUNCTATE KERATITIS (SPK): OD_SPK: ABSENT

## 2022-07-20 ASSESSMENT — LID POSITION - DERMATOCHALASIS
OD_DERMATOCHALASIS: RUL 1+
OS_DERMATOCHALASIS: LUL 1+

## 2022-07-20 ASSESSMENT — LID POSITION - PTOSIS: OS_PTOSIS: LUL 1+

## 2022-07-20 ASSESSMENT — VISUAL ACUITY
OD_BCVA: 20/60-2
OS_BCVA: 20/150+1

## 2022-07-20 ASSESSMENT — SPHEQUIV_DERIVED
OD_SPHEQUIV: -0.625
OS_SPHEQUIV: 1.25

## 2022-08-29 ENCOUNTER — RX ONLY (RX ONLY)
Age: 87
End: 2022-08-29

## 2022-08-29 ENCOUNTER — DOCTOR'S OFFICE (OUTPATIENT)
Dept: URBAN - NONMETROPOLITAN AREA CLINIC 1 | Facility: CLINIC | Age: 87
Setting detail: OPHTHALMOLOGY
End: 2022-08-29
Payer: COMMERCIAL

## 2022-08-29 DIAGNOSIS — H01.001: ICD-10-CM

## 2022-08-29 DIAGNOSIS — H01.002: ICD-10-CM

## 2022-08-29 DIAGNOSIS — H10.9: ICD-10-CM

## 2022-08-29 DIAGNOSIS — G45.3: ICD-10-CM

## 2022-08-29 PROBLEM — H02.834 DERMATOCHALASIS; RIGHT UPPER LID, LEFT UPPER LID: Status: ACTIVE | Noted: 2021-02-26

## 2022-08-29 PROBLEM — H02.831 DERMATOCHALASIS; RIGHT UPPER LID, LEFT UPPER LID: Status: ACTIVE | Noted: 2021-02-26

## 2022-08-29 PROBLEM — M32.9 SYSTEMIC LUPUS ERYTHEMATOSUS, UNSPECIFIED: Status: ACTIVE | Noted: 2017-03-13

## 2022-08-29 PROBLEM — H04.121 DRY EYE; RIGHT EYE, LEFT EYE, BOTH EYES: Status: ACTIVE | Noted: 2018-06-18

## 2022-08-29 PROBLEM — Z79.891: Status: ACTIVE | Noted: 2017-03-13

## 2022-08-29 PROBLEM — M06.9 RHEUMATOID ARTHRITIS ; BOTH EYES: Status: ACTIVE | Noted: 2017-03-13

## 2022-08-29 PROBLEM — M31.6: Status: ACTIVE | Noted: 2017-03-13

## 2022-08-29 PROBLEM — H10.12 ALLERGIC CONJUNCTIVITS; RIGHT EYE, LEFT EYE: Status: ACTIVE | Noted: 2017-03-13

## 2022-08-29 PROBLEM — H01.004 BLEPHARITIS; RIGHT UPPER LID, RIGHT LOWER LID, LEFT UPPER LID, LEFT LOWER LID: Status: ACTIVE | Noted: 2021-04-23

## 2022-08-29 PROBLEM — H10.11 ALLERGIC CONJUNCTIVITS; RIGHT EYE, LEFT EYE: Status: ACTIVE | Noted: 2017-03-13

## 2022-08-29 PROBLEM — H04.122 DRY EYE; RIGHT EYE, LEFT EYE, BOTH EYES: Status: ACTIVE | Noted: 2018-06-18

## 2022-08-29 PROBLEM — D23.122 OTHER BENIGN NEOPLASM OF SKIN OF LEFT LOWER EYELID, INCLUDING CANTHUS: Status: ACTIVE | Noted: 2019-01-14

## 2022-08-29 PROBLEM — H02.88B MEIBOMIAN GLAND DYSFUNCTION LEFT EYE, UPPER AND LOWER EYELIDS: Status: ACTIVE | Noted: 2021-04-23

## 2022-08-29 PROBLEM — H01.005 BLEPHARITIS; RIGHT UPPER LID, RIGHT LOWER LID, LEFT UPPER LID, LEFT LOWER LID: Status: ACTIVE | Noted: 2021-04-23

## 2022-08-29 PROBLEM — H04.123 DRY EYE; RIGHT EYE, LEFT EYE, BOTH EYES: Status: ACTIVE | Noted: 2018-06-18

## 2022-08-29 PROCEDURE — 99213 OFFICE O/P EST LOW 20 MIN: CPT | Performed by: OPHTHALMOLOGY

## 2022-08-29 ASSESSMENT — REFRACTION_AUTOREFRACTION
OD_CYLINDER: -1.25
OS_AXIS: 070
OS_CYLINDER: -1.75
OS_SPHERE: +1.25
OD_SPHERE: -0.75
OD_AXIS: 025

## 2022-08-29 ASSESSMENT — KERATOMETRY
OD_K2POWER_DIOPTERS: 45.25
OS_K2POWER_DIOPTERS: 44.50
OD_K1POWER_DIOPTERS: 43.75
OD_AXISANGLE_DEGREES: 134
OS_AXISANGLE_DEGREES: 133
OS_K1POWER_DIOPTERS: 42.75

## 2022-08-29 ASSESSMENT — REFRACTION_CURRENTRX
OD_ADD: +2.75
OS_AXIS: 172
OD_OVR_VA: 20/
OS_VPRISM_DIRECTION: PROGS
OS_ADD: +2.75
OS_CYLINDER: -0.75
OS_SPHERE: +1.00
OS_OVR_VA: 20/
OD_AXIS: 65
OD_SPHERE: +1.25
OD_VPRISM_DIRECTION: PROGS
OD_CYLINDER: -1.25

## 2022-08-29 ASSESSMENT — AXIALLENGTH_DERIVED
OD_AL: 23.7615
OS_AL: 23.4014

## 2022-08-29 ASSESSMENT — LID EXAM ASSESSMENTS
OS_TRICHIASIS: LLL LUL 1+
OS_BLEPHARITIS: LLL LUL
OD_BLEPHARITIS: RLL RUL
OD_TRICHIASIS: RLL RUL 1+

## 2022-08-29 ASSESSMENT — SPHEQUIV_DERIVED
OD_SPHEQUIV: -1.375
OS_SPHEQUIV: 0.375

## 2022-08-29 ASSESSMENT — LID POSITION - DERMATOCHALASIS
OS_DERMATOCHALASIS: LUL 1+
OD_DERMATOCHALASIS: RUL 1+

## 2022-08-29 ASSESSMENT — VISUAL ACUITY
OD_BCVA: 20/80
OS_BCVA: 20/80+2

## 2022-08-29 ASSESSMENT — CONFRONTATIONAL VISUAL FIELD TEST (CVF)
OS_FINDINGS: FULL
OD_FINDINGS: FULL

## 2022-08-29 ASSESSMENT — SUPERFICIAL PUNCTATE KERATITIS (SPK): OD_SPK: ABSENT

## 2022-08-29 ASSESSMENT — LID POSITION - PTOSIS: OS_PTOSIS: LUL 1+

## 2022-11-10 ENCOUNTER — RX ONLY (RX ONLY)
Age: 87
End: 2022-11-10

## 2022-11-10 ENCOUNTER — DOCTOR'S OFFICE (OUTPATIENT)
Dept: URBAN - NONMETROPOLITAN AREA CLINIC 1 | Facility: CLINIC | Age: 87
Setting detail: OPHTHALMOLOGY
End: 2022-11-10
Payer: COMMERCIAL

## 2022-11-10 DIAGNOSIS — H10.9: ICD-10-CM

## 2022-11-10 DIAGNOSIS — H01.004: ICD-10-CM

## 2022-11-10 DIAGNOSIS — H01.002: ICD-10-CM

## 2022-11-10 DIAGNOSIS — H01.001: ICD-10-CM

## 2022-11-10 PROCEDURE — 99213 OFFICE O/P EST LOW 20 MIN: CPT | Performed by: OPHTHALMOLOGY

## 2022-11-10 ASSESSMENT — REFRACTION_AUTOREFRACTION
OD_CYLINDER: -1.00
OS_AXIS: 068
OD_SPHERE: -1.00
OS_SPHERE: +3.00
OD_AXIS: 066
OS_CYLINDER: -3.00

## 2022-11-10 ASSESSMENT — SPHEQUIV_DERIVED
OD_SPHEQUIV: -1.5
OS_SPHEQUIV: 1.5

## 2022-11-10 ASSESSMENT — LID EXAM ASSESSMENTS
OS_MEIBOMITIS: 2+
OS_BLEPHARITIS: LLL LUL
OD_BLEPHARITIS: RLL RUL
OD_TRICHIASIS: RLL RUL 1+
OD_MEIBOMITIS: 2+
OS_TRICHIASIS: LLL LUL 1+

## 2022-11-10 ASSESSMENT — REFRACTION_CURRENTRX
OS_OVR_VA: 20/
OD_VPRISM_DIRECTION: PROGS
OD_SPHERE: +1.25
OD_AXIS: 65
OS_CYLINDER: -0.75
OS_VPRISM_DIRECTION: PROGS
OS_SPHERE: +1.00
OS_ADD: +2.75
OD_OVR_VA: 20/
OD_ADD: +2.75
OS_AXIS: 172
OD_CYLINDER: -1.25

## 2022-11-10 ASSESSMENT — AXIALLENGTH_DERIVED
OD_AL: 23.811
OS_AL: 22.978

## 2022-11-10 ASSESSMENT — LID POSITION - DERMATOCHALASIS
OD_DERMATOCHALASIS: RUL 1+
OS_DERMATOCHALASIS: LUL 1+

## 2022-11-10 ASSESSMENT — KERATOMETRY
OD_K1POWER_DIOPTERS: 43.75
OS_K1POWER_DIOPTERS: 42.75
OS_K2POWER_DIOPTERS: 44.50
OS_AXISANGLE_DEGREES: 133
OD_K2POWER_DIOPTERS: 45.25
OD_AXISANGLE_DEGREES: 134

## 2022-11-10 ASSESSMENT — CONFRONTATIONAL VISUAL FIELD TEST (CVF)
OD_FINDINGS: FULL
OS_FINDINGS: FULL

## 2022-11-10 ASSESSMENT — SUPERFICIAL PUNCTATE KERATITIS (SPK): OD_SPK: ABSENT

## 2022-11-10 ASSESSMENT — LID POSITION - PTOSIS: OS_PTOSIS: LUL 1+

## 2022-11-10 ASSESSMENT — VISUAL ACUITY
OD_BCVA: 20/70-1
OS_BCVA: 20/200+2

## 2022-11-29 ENCOUNTER — DOCTOR'S OFFICE (OUTPATIENT)
Dept: URBAN - NONMETROPOLITAN AREA CLINIC 1 | Facility: CLINIC | Age: 87
Setting detail: OPHTHALMOLOGY
End: 2022-11-29
Payer: COMMERCIAL

## 2022-11-29 ENCOUNTER — RX ONLY (RX ONLY)
Age: 87
End: 2022-11-29

## 2022-11-29 DIAGNOSIS — H02.012: ICD-10-CM

## 2022-11-29 DIAGNOSIS — H01.004: ICD-10-CM

## 2022-11-29 DIAGNOSIS — H01.002: ICD-10-CM

## 2022-11-29 DIAGNOSIS — H02.015: ICD-10-CM

## 2022-11-29 DIAGNOSIS — H10.9: ICD-10-CM

## 2022-11-29 DIAGNOSIS — H04.121: ICD-10-CM

## 2022-11-29 DIAGNOSIS — H01.005: ICD-10-CM

## 2022-11-29 DIAGNOSIS — Z96.1: ICD-10-CM

## 2022-11-29 DIAGNOSIS — H04.122: ICD-10-CM

## 2022-11-29 DIAGNOSIS — H01.001: ICD-10-CM

## 2022-11-29 DIAGNOSIS — H02.88B: ICD-10-CM

## 2022-11-29 PROBLEM — H02.40 PTOSIS; LEFT EYE: Status: ACTIVE | Noted: 2022-11-29

## 2022-11-29 PROCEDURE — 99213 OFFICE O/P EST LOW 20 MIN: CPT | Performed by: OPHTHALMOLOGY

## 2022-11-29 PROCEDURE — 67820 REVISE EYELASHES: CPT | Performed by: OPHTHALMOLOGY

## 2022-11-29 ASSESSMENT — KERATOMETRY
OS_K2POWER_DIOPTERS: 44.50
OD_AXISANGLE_DEGREES: 134
OS_K1POWER_DIOPTERS: 42.75
OD_K1POWER_DIOPTERS: 43.75
OS_AXISANGLE_DEGREES: 133
OD_K2POWER_DIOPTERS: 45.25

## 2022-11-29 ASSESSMENT — SUPERFICIAL PUNCTATE KERATITIS (SPK): OD_SPK: ABSENT

## 2022-11-29 ASSESSMENT — REFRACTION_CURRENTRX
OS_ADD: +2.75
OS_VPRISM_DIRECTION: PROGS
OS_OVR_VA: 20/
OS_SPHERE: +1.00
OD_CYLINDER: -1.25
OD_SPHERE: +1.25
OD_AXIS: 65
OD_OVR_VA: 20/
OS_CYLINDER: -0.75
OD_VPRISM_DIRECTION: PROGS
OD_ADD: +2.75
OS_AXIS: 172

## 2022-11-29 ASSESSMENT — LID EXAM ASSESSMENTS
OS_TRICHIASIS: LLL 1+
OD_MEIBOMITIS: 2+
OS_BLEPHARITIS: LLL LUL
OS_MEIBOMITIS: 2+
OD_BLEPHARITIS: RLL RUL
OD_TRICHIASIS: RLL 1+

## 2022-11-29 ASSESSMENT — LID POSITION - DERMATOCHALASIS
OD_DERMATOCHALASIS: RUL 1+
OS_DERMATOCHALASIS: LUL 1+

## 2022-11-29 ASSESSMENT — CONFRONTATIONAL VISUAL FIELD TEST (CVF)
OD_FINDINGS: FULL
OS_FINDINGS: FULL

## 2022-11-29 ASSESSMENT — REFRACTION_AUTOREFRACTION
OS_AXIS: 81
OS_CYLINDER: -3.75
OD_AXIS: 144
OD_SPHERE: -1.50
OS_SPHERE: +2.75
OD_CYLINDER: -0.25

## 2022-11-29 ASSESSMENT — SPHEQUIV_DERIVED
OS_SPHEQUIV: 0.875
OD_SPHEQUIV: -1.625

## 2022-11-29 ASSESSMENT — VISUAL ACUITY
OS_BCVA: 20/150
OD_BCVA: 20/150+1

## 2022-11-29 ASSESSMENT — LID POSITION - PTOSIS: OS_PTOSIS: LUL 1+

## 2022-11-29 ASSESSMENT — AXIALLENGTH_DERIVED
OD_AL: 23.8607
OS_AL: 23.2113

## 2023-01-04 ENCOUNTER — DOCTOR'S OFFICE (OUTPATIENT)
Dept: URBAN - NONMETROPOLITAN AREA CLINIC 1 | Facility: CLINIC | Age: 88
Setting detail: OPHTHALMOLOGY
End: 2023-01-04
Payer: COMMERCIAL

## 2023-01-04 ENCOUNTER — RX ONLY (RX ONLY)
Age: 88
End: 2023-01-04

## 2023-01-04 DIAGNOSIS — H01.005: ICD-10-CM

## 2023-01-04 DIAGNOSIS — H02.88B: ICD-10-CM

## 2023-01-04 DIAGNOSIS — H02.015: ICD-10-CM

## 2023-01-04 DIAGNOSIS — H01.002: ICD-10-CM

## 2023-01-04 DIAGNOSIS — H04.121: ICD-10-CM

## 2023-01-04 DIAGNOSIS — H01.001: ICD-10-CM

## 2023-01-04 DIAGNOSIS — H01.004: ICD-10-CM

## 2023-01-04 DIAGNOSIS — H04.122: ICD-10-CM

## 2023-01-04 DIAGNOSIS — Z79.899: ICD-10-CM

## 2023-01-04 DIAGNOSIS — Z96.1: ICD-10-CM

## 2023-01-04 PROBLEM — H02.40 PTOSIS; LEFT EYE: Status: ACTIVE | Noted: 2023-01-04

## 2023-01-04 PROCEDURE — 92134 CPTRZ OPH DX IMG PST SGM RTA: CPT | Performed by: OPHTHALMOLOGY

## 2023-01-04 PROCEDURE — 99213 OFFICE O/P EST LOW 20 MIN: CPT | Performed by: OPHTHALMOLOGY

## 2023-01-04 ASSESSMENT — REFRACTION_CURRENTRX
OS_VPRISM_DIRECTION: PROGS
OD_SPHERE: +1.25
OD_AXIS: 65
OS_ADD: +2.75
OD_ADD: +2.75
OS_AXIS: 172
OS_SPHERE: +1.00
OD_CYLINDER: -1.25
OD_OVR_VA: 20/
OD_VPRISM_DIRECTION: PROGS
OS_OVR_VA: 20/
OS_CYLINDER: -0.75

## 2023-01-04 ASSESSMENT — REFRACTION_AUTOREFRACTION
OD_AXIS: 097
OS_AXIS: 023
OD_CYLINDER: *1.25
OS_CYLINDER: -1.25
OS_SPHERE: +0.50
OD_SPHERE: +0.75

## 2023-01-04 ASSESSMENT — VISUAL ACUITY
OS_BCVA: 20/70-1
OD_BCVA: 20/70-2

## 2023-01-04 ASSESSMENT — LID POSITION - DERMATOCHALASIS
OS_DERMATOCHALASIS: LUL 1+
OD_DERMATOCHALASIS: RUL 1+

## 2023-01-04 ASSESSMENT — SUPERFICIAL PUNCTATE KERATITIS (SPK): OD_SPK: ABSENT

## 2023-01-04 ASSESSMENT — KERATOMETRY
OD_K2POWER_DIOPTERS: 45.25
OD_AXISANGLE_DEGREES: 134
OS_AXISANGLE_DEGREES: 133
OS_K2POWER_DIOPTERS: 44.50
OS_K1POWER_DIOPTERS: 42.75
OD_K1POWER_DIOPTERS: 43.75

## 2023-01-04 ASSESSMENT — CONFRONTATIONAL VISUAL FIELD TEST (CVF)
OS_FINDINGS: FULL
OD_FINDINGS: FULL

## 2023-01-04 ASSESSMENT — LID POSITION - PTOSIS: OS_PTOSIS: LUL 1+

## 2023-01-04 ASSESSMENT — SPHEQUIV_DERIVED: OS_SPHEQUIV: -0.125

## 2023-01-04 ASSESSMENT — LID EXAM ASSESSMENTS
OS_TRICHIASIS: LLL
OD_BLEPHARITIS: RLL RUL 1+
OS_BLEPHARITIS: LLL LUL 1+

## 2023-01-04 ASSESSMENT — AXIALLENGTH_DERIVED: OS_AL: 23.5947

## 2023-02-22 ENCOUNTER — RX ONLY (RX ONLY)
Age: 88
End: 2023-02-22

## 2023-02-22 RX ORDER — DOXYCYCLINE HYCLATE 20 MG/1
TABLET, FILM COATED ORAL
Qty: 180 | Refills: 0 | Status: ACTIVE | OUTPATIENT

## 2023-03-15 ENCOUNTER — RX ONLY (RX ONLY)
Age: 88
End: 2023-03-15

## 2023-03-15 ENCOUNTER — DOCTOR'S OFFICE (OUTPATIENT)
Dept: URBAN - NONMETROPOLITAN AREA CLINIC 1 | Facility: CLINIC | Age: 88
Setting detail: OPHTHALMOLOGY
End: 2023-03-15
Payer: COMMERCIAL

## 2023-03-15 DIAGNOSIS — H02.88B: ICD-10-CM

## 2023-03-15 DIAGNOSIS — H01.005: ICD-10-CM

## 2023-03-15 DIAGNOSIS — H43.813: ICD-10-CM

## 2023-03-15 DIAGNOSIS — H04.121: ICD-10-CM

## 2023-03-15 DIAGNOSIS — H01.004: ICD-10-CM

## 2023-03-15 DIAGNOSIS — H01.001: ICD-10-CM

## 2023-03-15 DIAGNOSIS — H04.122: ICD-10-CM

## 2023-03-15 DIAGNOSIS — H01.002: ICD-10-CM

## 2023-03-15 DIAGNOSIS — D23.122: ICD-10-CM

## 2023-03-15 DIAGNOSIS — Z79.899: ICD-10-CM

## 2023-03-15 PROCEDURE — 92012 INTRM OPH EXAM EST PATIENT: CPT | Performed by: OPHTHALMOLOGY

## 2023-03-15 PROCEDURE — 92134 CPTRZ OPH DX IMG PST SGM RTA: CPT | Performed by: OPHTHALMOLOGY

## 2023-03-15 ASSESSMENT — KERATOMETRY
OD_K1POWER_DIOPTERS: 43.75
OS_K2POWER_DIOPTERS: 44.50
OD_AXISANGLE_DEGREES: 134
OD_K2POWER_DIOPTERS: 45.25
OS_K1POWER_DIOPTERS: 42.75
OS_AXISANGLE_DEGREES: 133

## 2023-03-15 ASSESSMENT — LID POSITION - DERMATOCHALASIS
OD_DERMATOCHALASIS: RUL 1+
OS_DERMATOCHALASIS: LUL 1+

## 2023-03-15 ASSESSMENT — REFRACTION_CURRENTRX
OS_ADD: +2.75
OD_ADD: +2.75
OD_SPHERE: +1.25
OS_AXIS: 172
OD_OVR_VA: 20/
OS_VPRISM_DIRECTION: PROGS
OS_CYLINDER: -0.75
OD_AXIS: 65
OD_VPRISM_DIRECTION: PROGS
OD_CYLINDER: -1.25
OS_OVR_VA: 20/
OS_SPHERE: +1.00

## 2023-03-15 ASSESSMENT — LID EXAM ASSESSMENTS
OD_BLEPHARITIS: RLL RUL 1+
OS_TRICHIASIS: LLL
OS_BLEPHARITIS: LLL LUL 1+

## 2023-03-15 ASSESSMENT — LID POSITION - PTOSIS: OS_PTOSIS: LUL 1+

## 2023-03-15 ASSESSMENT — AXIALLENGTH_DERIVED
OD_AL: 23.7122
OS_AL: 23.7912

## 2023-03-15 ASSESSMENT — REFRACTION_AUTOREFRACTION
OS_AXIS: 058
OS_SPHERE: 0.00
OD_AXIS: 138
OD_CYLINDER: -1.00
OD_SPHERE: -0.75
OS_CYLINDER: -1.25

## 2023-03-15 ASSESSMENT — CONFRONTATIONAL VISUAL FIELD TEST (CVF)
OD_FINDINGS: FULL
OS_FINDINGS: FULL

## 2023-03-15 ASSESSMENT — SPHEQUIV_DERIVED
OS_SPHEQUIV: -0.625
OD_SPHEQUIV: -1.25

## 2023-03-15 ASSESSMENT — VISUAL ACUITY
OS_BCVA: 20/100-1
OD_BCVA: 20/70

## 2023-03-15 ASSESSMENT — SUPERFICIAL PUNCTATE KERATITIS (SPK): OD_SPK: ABSENT

## 2023-04-04 ENCOUNTER — DOCTOR'S OFFICE (OUTPATIENT)
Dept: URBAN - NONMETROPOLITAN AREA CLINIC 1 | Facility: CLINIC | Age: 88
Setting detail: OPHTHALMOLOGY
End: 2023-04-04
Payer: COMMERCIAL

## 2023-04-04 DIAGNOSIS — H01.004: ICD-10-CM

## 2023-04-04 DIAGNOSIS — H01.005: ICD-10-CM

## 2023-04-04 DIAGNOSIS — H04.122: ICD-10-CM

## 2023-04-04 DIAGNOSIS — S05.02XA: ICD-10-CM

## 2023-04-04 DIAGNOSIS — H10.503: ICD-10-CM

## 2023-04-04 DIAGNOSIS — H04.121: ICD-10-CM

## 2023-04-04 DIAGNOSIS — H01.001: ICD-10-CM

## 2023-04-04 DIAGNOSIS — H01.002: ICD-10-CM

## 2023-04-04 DIAGNOSIS — H02.88B: ICD-10-CM

## 2023-04-04 PROCEDURE — 99214 OFFICE O/P EST MOD 30 MIN: CPT | Performed by: OPHTHALMOLOGY

## 2023-04-04 ASSESSMENT — LID EXAM ASSESSMENTS
OS_BLEPHARITIS: LLL LUL 1+
OD_BLEPHARITIS: RLL RUL 1+

## 2023-04-04 ASSESSMENT — SPHEQUIV_DERIVED
OD_SPHEQUIV: -1.25
OS_SPHEQUIV: -0.625

## 2023-04-04 ASSESSMENT — CONFRONTATIONAL VISUAL FIELD TEST (CVF)
OS_FINDINGS: FULL
OD_FINDINGS: FULL

## 2023-04-04 ASSESSMENT — REFRACTION_AUTOREFRACTION
OD_AXIS: 138
OD_SPHERE: -0.75
OS_CYLINDER: -1.25
OD_CYLINDER: -1.00
OS_SPHERE: 0.00
OS_AXIS: 058

## 2023-04-04 ASSESSMENT — REFRACTION_CURRENTRX
OS_AXIS: 172
OS_OVR_VA: 20/
OD_ADD: +2.75
OS_ADD: +2.75
OD_AXIS: 65
OD_OVR_VA: 20/
OS_SPHERE: +1.00
OD_SPHERE: +1.25
OS_CYLINDER: -0.75
OD_CYLINDER: -1.25
OD_VPRISM_DIRECTION: PROGS
OS_VPRISM_DIRECTION: PROGS

## 2023-04-04 ASSESSMENT — SUPERFICIAL PUNCTATE KERATITIS (SPK): OD_SPK: ABSENT

## 2023-04-04 ASSESSMENT — KERATOMETRY
OS_K1POWER_DIOPTERS: 42.75
OS_K2POWER_DIOPTERS: 44.50
OD_AXISANGLE_DEGREES: 134
OD_K2POWER_DIOPTERS: 45.25
OS_AXISANGLE_DEGREES: 133
OD_K1POWER_DIOPTERS: 43.75

## 2023-04-04 ASSESSMENT — LID POSITION - DERMATOCHALASIS
OD_DERMATOCHALASIS: RUL 1+
OS_DERMATOCHALASIS: LUL 1+

## 2023-04-04 ASSESSMENT — AXIALLENGTH_DERIVED
OD_AL: 23.7122
OS_AL: 23.7912

## 2023-04-04 ASSESSMENT — TONOMETRY
OS_IOP_MMHG: 13
OD_IOP_MMHG: 12

## 2023-04-04 ASSESSMENT — CORNEAL TRAUMA - ABRASION: OS_ABRASION: PRESENT

## 2023-04-04 ASSESSMENT — LID POSITION - PTOSIS
OD_PTOSIS: RUL
OS_PTOSIS: LUL 1+

## 2023-04-04 ASSESSMENT — VISUAL ACUITY
OS_BCVA: 20/100
OD_BCVA: 20/100

## 2023-05-03 ENCOUNTER — RX ONLY (RX ONLY)
Age: 88
End: 2023-05-03

## 2023-05-03 ENCOUNTER — DOCTOR'S OFFICE (OUTPATIENT)
Dept: URBAN - NONMETROPOLITAN AREA CLINIC 1 | Facility: CLINIC | Age: 88
Setting detail: OPHTHALMOLOGY
End: 2023-05-03
Payer: COMMERCIAL

## 2023-05-03 DIAGNOSIS — H01.002: ICD-10-CM

## 2023-05-03 DIAGNOSIS — H01.004: ICD-10-CM

## 2023-05-03 DIAGNOSIS — H04.121: ICD-10-CM

## 2023-05-03 DIAGNOSIS — S05.02XA: ICD-10-CM

## 2023-05-03 DIAGNOSIS — H04.122: ICD-10-CM

## 2023-05-03 DIAGNOSIS — H01.005: ICD-10-CM

## 2023-05-03 DIAGNOSIS — H02.88B: ICD-10-CM

## 2023-05-03 DIAGNOSIS — H10.503: ICD-10-CM

## 2023-05-03 DIAGNOSIS — H02.015: ICD-10-CM

## 2023-05-03 DIAGNOSIS — H01.001: ICD-10-CM

## 2023-05-03 PROBLEM — H02.834 DERMATOCHALASIS; RIGHT UPPER LID,, LEFT UPPER LID: Status: ACTIVE | Noted: 2023-04-04

## 2023-05-03 PROBLEM — H02.831 DERMATOCHALASIS; RIGHT UPPER LID,, LEFT UPPER LID: Status: ACTIVE | Noted: 2023-04-04

## 2023-05-03 PROCEDURE — 99213 OFFICE O/P EST LOW 20 MIN: CPT | Performed by: OPHTHALMOLOGY

## 2023-05-03 ASSESSMENT — LID POSITION - PTOSIS
OS_PTOSIS: LUL 1+
OD_PTOSIS: RUL

## 2023-05-03 ASSESSMENT — LID POSITION - DERMATOCHALASIS
OS_DERMATOCHALASIS: LUL 1+
OD_DERMATOCHALASIS: RUL 1+

## 2023-05-03 ASSESSMENT — LID EXAM ASSESSMENTS
OS_TRICHIASIS: LLL
OS_BLEPHARITIS: LLL LUL T
OD_BLEPHARITIS: RLL RUL T

## 2023-05-03 ASSESSMENT — CONFRONTATIONAL VISUAL FIELD TEST (CVF)
OS_FINDINGS: FULL
OD_FINDINGS: FULL

## 2023-05-03 ASSESSMENT — SUPERFICIAL PUNCTATE KERATITIS (SPK)
OS_SPK: ABSENT
OD_SPK: ABSENT

## 2023-05-04 PROBLEM — H02.40: Status: ACTIVE | Noted: 2023-05-03

## 2023-05-04 ASSESSMENT — SPHEQUIV_DERIVED: OD_SPHEQUIV: -1.125

## 2023-05-04 ASSESSMENT — REFRACTION_CURRENTRX
OS_OVR_VA: 20/
OS_VPRISM_DIRECTION: PROGS
OD_AXIS: 65
OS_CYLINDER: -0.75
OD_ADD: +2.75
OS_SPHERE: +1.00
OD_CYLINDER: -1.25
OS_ADD: +2.75
OD_SPHERE: +1.25
OD_OVR_VA: 20/
OS_AXIS: 172
OD_VPRISM_DIRECTION: PROGS

## 2023-05-04 ASSESSMENT — VISUAL ACUITY
OD_BCVA: 20/150-1
OS_BCVA: 20/80

## 2023-05-04 ASSESSMENT — REFRACTION_AUTOREFRACTION
OD_AXIS: 105
OS_CYLINDER: -2.25
OS_AXIS: 024
OD_CYLINDER: -1.25
OS_SPHERE: SPH
OD_SPHERE: -0.50

## 2023-05-04 ASSESSMENT — KERATOMETRY
OD_K2POWER_DIOPTERS: 45.25
OS_K1POWER_DIOPTERS: 42.75
OS_K2POWER_DIOPTERS: 44.50
OS_AXISANGLE_DEGREES: 133
OD_K1POWER_DIOPTERS: 43.75
OD_AXISANGLE_DEGREES: 134

## 2023-05-04 ASSESSMENT — AXIALLENGTH_DERIVED: OD_AL: 23.6631

## 2023-08-08 ENCOUNTER — DOCTOR'S OFFICE (OUTPATIENT)
Dept: URBAN - NONMETROPOLITAN AREA CLINIC 1 | Facility: CLINIC | Age: 88
Setting detail: OPHTHALMOLOGY
End: 2023-08-08
Payer: COMMERCIAL

## 2023-08-08 ENCOUNTER — RX ONLY (RX ONLY)
Age: 88
End: 2023-08-08

## 2023-08-08 DIAGNOSIS — H01.001: ICD-10-CM

## 2023-08-08 DIAGNOSIS — H01.005: ICD-10-CM

## 2023-08-08 DIAGNOSIS — H01.004: ICD-10-CM

## 2023-08-08 DIAGNOSIS — H02.88B: ICD-10-CM

## 2023-08-08 DIAGNOSIS — H04.121: ICD-10-CM

## 2023-08-08 DIAGNOSIS — H04.122: ICD-10-CM

## 2023-08-08 DIAGNOSIS — H01.002: ICD-10-CM

## 2023-08-08 PROBLEM — H02.40: Status: ACTIVE | Noted: 2023-08-08

## 2023-08-08 PROCEDURE — 99212 OFFICE O/P EST SF 10 MIN: CPT | Performed by: OPHTHALMOLOGY

## 2023-08-08 ASSESSMENT — CONFRONTATIONAL VISUAL FIELD TEST (CVF)
OD_FINDINGS: FULL
OS_FINDINGS: FULL

## 2023-08-08 ASSESSMENT — REFRACTION_AUTOREFRACTION
OD_CYLINDER: -1.25
OD_AXIS: 094
OD_SPHERE: +0.50
OS_SPHERE: +0.50
OS_AXIS: 144
OS_CYLINDER: -1.00

## 2023-08-08 ASSESSMENT — SUPERFICIAL PUNCTATE KERATITIS (SPK)
OS_SPK: ABSENT
OD_SPK: ABSENT

## 2023-08-08 ASSESSMENT — VISUAL ACUITY
OS_BCVA: 20/40-1
OD_BCVA: 20/40-2

## 2023-08-08 ASSESSMENT — REFRACTION_CURRENTRX
OS_CYLINDER: -0.75
OD_AXIS: 65
OD_SPHERE: +1.25
OS_SPHERE: +1.00
OS_ADD: +2.75
OD_ADD: +2.75
OD_OVR_VA: 20/
OS_OVR_VA: 20/
OS_AXIS: 172
OD_VPRISM_DIRECTION: PROGS
OS_VPRISM_DIRECTION: PROGS
OD_CYLINDER: -1.25

## 2023-08-08 ASSESSMENT — LID POSITION - DERMATOCHALASIS
OD_DERMATOCHALASIS: RUL 1+
OS_DERMATOCHALASIS: LUL 1+

## 2023-08-08 ASSESSMENT — KERATOMETRY
OD_K1POWER_DIOPTERS: 43.75
OS_AXISANGLE_DEGREES: 133
OD_K2POWER_DIOPTERS: 45.25
OD_AXISANGLE_DEGREES: 134
OS_K2POWER_DIOPTERS: 44.50
OS_K1POWER_DIOPTERS: 42.75

## 2023-08-08 ASSESSMENT — LID EXAM ASSESSMENTS
OS_BLEPHARITIS: LLL LUL T
OD_BLEPHARITIS: RLL RUL T

## 2023-08-08 ASSESSMENT — LID POSITION - PTOSIS
OS_PTOSIS: LUL 1+
OD_PTOSIS: RUL

## 2023-08-08 ASSESSMENT — AXIALLENGTH_DERIVED
OS_AL: 23.5461
OD_AL: 23.2775

## 2023-08-08 ASSESSMENT — SPHEQUIV_DERIVED
OS_SPHEQUIV: 0
OD_SPHEQUIV: -0.125

## 2024-06-17 ENCOUNTER — APPOINTMENT (EMERGENCY)
Dept: RADIOLOGY | Facility: HOSPITAL | Age: 89
DRG: 291 | End: 2024-06-17
Payer: MEDICARE

## 2024-06-17 ENCOUNTER — HOSPITAL ENCOUNTER (INPATIENT)
Facility: HOSPITAL | Age: 89
LOS: 4 days | Discharge: HOME HEALTH | DRG: 291 | End: 2024-06-21
Attending: EMERGENCY MEDICINE | Admitting: FAMILY MEDICINE
Payer: MEDICARE

## 2024-06-17 DIAGNOSIS — D61.818 PANCYTOPENIA (HCC): ICD-10-CM

## 2024-06-17 DIAGNOSIS — R53.1 WEAKNESS: ICD-10-CM

## 2024-06-17 DIAGNOSIS — N17.9 AKI (ACUTE KIDNEY INJURY) (HCC): ICD-10-CM

## 2024-06-17 DIAGNOSIS — G20.A1 PARKINSON'S DISEASE, UNSPECIFIED WHETHER DYSKINESIA PRESENT, UNSPECIFIED WHETHER MANIFESTATIONS FLUCTUATE: ICD-10-CM

## 2024-06-17 DIAGNOSIS — I25.10 CORONARY ARTERY DISEASE INVOLVING NATIVE HEART WITHOUT ANGINA PECTORIS, UNSPECIFIED VESSEL OR LESION TYPE: ICD-10-CM

## 2024-06-17 DIAGNOSIS — J44.1 CHRONIC OBSTRUCTIVE PULMONARY DISEASE WITH ACUTE EXACERBATION (HCC): ICD-10-CM

## 2024-06-17 DIAGNOSIS — I50.9 ACUTE ON CHRONIC CONGESTIVE HEART FAILURE, UNSPECIFIED HEART FAILURE TYPE (HCC): Primary | ICD-10-CM

## 2024-06-17 PROBLEM — J96.11 CHRONIC RESPIRATORY FAILURE WITH HYPOXIA (HCC): Status: ACTIVE | Noted: 2024-06-17

## 2024-06-17 PROBLEM — I48.0 PAROXYSMAL ATRIAL FIBRILLATION (HCC): Status: ACTIVE | Noted: 2024-06-17

## 2024-06-17 PROBLEM — M34.1 CREST SYNDROME (HCC): Status: ACTIVE | Noted: 2024-06-17

## 2024-06-17 PROBLEM — I50.43 ACUTE ON CHRONIC COMBINED SYSTOLIC AND DIASTOLIC CONGESTIVE HEART FAILURE (HCC): Status: ACTIVE | Noted: 2024-06-17

## 2024-06-17 LAB
2HR DELTA HS TROPONIN: 9 NG/L
4HR DELTA HS TROPONIN: 9 NG/L
ALBUMIN SERPL BCG-MCNC: 3.6 G/DL (ref 3.5–5)
ALP SERPL-CCNC: 68 U/L (ref 34–104)
ALT SERPL W P-5'-P-CCNC: 14 U/L (ref 7–52)
ANION GAP SERPL CALCULATED.3IONS-SCNC: 8 MMOL/L (ref 4–13)
ANISOCYTOSIS BLD QL SMEAR: PRESENT
AST SERPL W P-5'-P-CCNC: 32 U/L (ref 13–39)
ATRIAL RATE: 105 BPM
ATRIAL RATE: 112 BPM
ATRIAL RATE: 88 BPM
BASE EX.OXY STD BLDV CALC-SCNC: 44.1 % (ref 60–80)
BASE EXCESS BLDV CALC-SCNC: 0.2 MMOL/L
BASOPHILS # BLD AUTO: 0 THOUSANDS/ÂΜL (ref 0–0.1)
BASOPHILS NFR BLD AUTO: 0 % (ref 0–1)
BILIRUB SERPL-MCNC: 0.64 MG/DL (ref 0.2–1)
BILIRUB UR QL STRIP: NEGATIVE
BNP SERPL-MCNC: 1361 PG/ML (ref 0–100)
BUN SERPL-MCNC: 27 MG/DL (ref 5–25)
CALCIUM SERPL-MCNC: 8.8 MG/DL (ref 8.4–10.2)
CARDIAC TROPONIN I PNL SERPL HS: 102 NG/L
CARDIAC TROPONIN I PNL SERPL HS: 102 NG/L
CARDIAC TROPONIN I PNL SERPL HS: 93 NG/L
CHLORIDE SERPL-SCNC: 108 MMOL/L (ref 96–108)
CLARITY UR: CLEAR
CO2 SERPL-SCNC: 23 MMOL/L (ref 21–32)
COLOR UR: YELLOW
CREAT SERPL-MCNC: 1.18 MG/DL (ref 0.6–1.3)
EOSINOPHIL # BLD AUTO: 0.05 THOUSAND/ÂΜL (ref 0–0.61)
EOSINOPHIL NFR BLD AUTO: 3 % (ref 0–6)
ERYTHROCYTE [DISTWIDTH] IN BLOOD BY AUTOMATED COUNT: 16.8 % (ref 11.6–15.1)
FLUAV RNA RESP QL NAA+PROBE: NEGATIVE
FLUBV RNA RESP QL NAA+PROBE: NEGATIVE
GFR SERPL CREATININE-BSD FRML MDRD: 41 ML/MIN/1.73SQ M
GLUCOSE SERPL-MCNC: 87 MG/DL (ref 65–140)
GLUCOSE UR STRIP-MCNC: NEGATIVE MG/DL
HCO3 BLDV-SCNC: 25.6 MMOL/L (ref 24–30)
HCT VFR BLD AUTO: 32.3 % (ref 34.8–46.1)
HGB BLD-MCNC: 9.6 G/DL (ref 11.5–15.4)
HGB UR QL STRIP.AUTO: NEGATIVE
IMM GRANULOCYTES # BLD AUTO: 0.01 THOUSAND/UL (ref 0–0.2)
IMM GRANULOCYTES NFR BLD AUTO: 1 % (ref 0–2)
KETONES UR STRIP-MCNC: NEGATIVE MG/DL
LACTATE SERPL-SCNC: 1.5 MMOL/L (ref 0.5–2)
LEUKOCYTE ESTERASE UR QL STRIP: NEGATIVE
LIPASE SERPL-CCNC: 66 U/L (ref 11–82)
LYMPHOCYTES # BLD AUTO: 0.5 THOUSANDS/ÂΜL (ref 0.6–4.47)
LYMPHOCYTES NFR BLD AUTO: 27 % (ref 14–44)
MCH RBC QN AUTO: 31.6 PG (ref 26.8–34.3)
MCHC RBC AUTO-ENTMCNC: 29.7 G/DL (ref 31.4–37.4)
MCV RBC AUTO: 106 FL (ref 82–98)
MICROCYTES BLD QL AUTO: PRESENT
MONOCYTES # BLD AUTO: 0.21 THOUSAND/ÂΜL (ref 0.17–1.22)
MONOCYTES NFR BLD AUTO: 11 % (ref 4–12)
NEUTROPHILS # BLD AUTO: 1.11 THOUSANDS/ÂΜL (ref 1.85–7.62)
NEUTS SEG NFR BLD AUTO: 58 % (ref 43–75)
NITRITE UR QL STRIP: NEGATIVE
NRBC BLD AUTO-RTO: 0 /100 WBCS
O2 CT BLDV-SCNC: 6.3 ML/DL
P AXIS: 50 DEGREES
P AXIS: 65 DEGREES
P AXIS: 81 DEGREES
PCO2 BLDV: 44.7 MM HG (ref 42–50)
PH BLDV: 7.38 [PH] (ref 7.3–7.4)
PH UR STRIP.AUTO: 6.5 [PH]
PLATELET # BLD AUTO: 68 THOUSANDS/UL (ref 149–390)
PLATELET BLD QL SMEAR: ABNORMAL
PMV BLD AUTO: 11.7 FL (ref 8.9–12.7)
PO2 BLDV: 27.1 MM HG (ref 35–45)
POTASSIUM SERPL-SCNC: 4.5 MMOL/L (ref 3.5–5.3)
PR INTERVAL: 138 MS
PR INTERVAL: 140 MS
PR INTERVAL: 146 MS
PROT SERPL-MCNC: 7.6 G/DL (ref 6.4–8.4)
PROT UR STRIP-MCNC: NEGATIVE MG/DL
QRS AXIS: 0 DEGREES
QRS AXIS: 167 DEGREES
QRS AXIS: 2 DEGREES
QRSD INTERVAL: 124 MS
QRSD INTERVAL: 126 MS
QRSD INTERVAL: 126 MS
QT INTERVAL: 368 MS
QT INTERVAL: 386 MS
QT INTERVAL: 414 MS
QTC INTERVAL: 486 MS
QTC INTERVAL: 500 MS
QTC INTERVAL: 526 MS
RBC # BLD AUTO: 3.04 MILLION/UL (ref 3.81–5.12)
RBC MORPH BLD: PRESENT
RSV RNA RESP QL NAA+PROBE: NEGATIVE
SARS-COV-2 RNA RESP QL NAA+PROBE: NEGATIVE
SODIUM SERPL-SCNC: 139 MMOL/L (ref 135–147)
SP GR UR STRIP.AUTO: 1.01 (ref 1–1.03)
T WAVE AXIS: -27 DEGREES
T WAVE AXIS: 168 DEGREES
T WAVE AXIS: 170 DEGREES
UROBILINOGEN UR QL STRIP.AUTO: 0.2 E.U./DL
VENTRICULAR RATE: 105 BPM
VENTRICULAR RATE: 112 BPM
VENTRICULAR RATE: 88 BPM
WBC # BLD AUTO: 1.88 THOUSAND/UL (ref 4.31–10.16)

## 2024-06-17 PROCEDURE — 80053 COMPREHEN METABOLIC PANEL: CPT | Performed by: EMERGENCY MEDICINE

## 2024-06-17 PROCEDURE — 99223 1ST HOSP IP/OBS HIGH 75: CPT | Performed by: FAMILY MEDICINE

## 2024-06-17 PROCEDURE — 71045 X-RAY EXAM CHEST 1 VIEW: CPT

## 2024-06-17 PROCEDURE — 94760 N-INVAS EAR/PLS OXIMETRY 1: CPT

## 2024-06-17 PROCEDURE — 83880 ASSAY OF NATRIURETIC PEPTIDE: CPT | Performed by: EMERGENCY MEDICINE

## 2024-06-17 PROCEDURE — 94640 AIRWAY INHALATION TREATMENT: CPT

## 2024-06-17 PROCEDURE — 36415 COLL VENOUS BLD VENIPUNCTURE: CPT | Performed by: EMERGENCY MEDICINE

## 2024-06-17 PROCEDURE — 99285 EMERGENCY DEPT VISIT HI MDM: CPT

## 2024-06-17 PROCEDURE — 94664 DEMO&/EVAL PT USE INHALER: CPT

## 2024-06-17 PROCEDURE — 81003 URINALYSIS AUTO W/O SCOPE: CPT | Performed by: EMERGENCY MEDICINE

## 2024-06-17 PROCEDURE — 83605 ASSAY OF LACTIC ACID: CPT | Performed by: EMERGENCY MEDICINE

## 2024-06-17 PROCEDURE — 82805 BLOOD GASES W/O2 SATURATION: CPT | Performed by: EMERGENCY MEDICINE

## 2024-06-17 PROCEDURE — 85025 COMPLETE CBC W/AUTO DIFF WBC: CPT | Performed by: EMERGENCY MEDICINE

## 2024-06-17 PROCEDURE — 99285 EMERGENCY DEPT VISIT HI MDM: CPT | Performed by: EMERGENCY MEDICINE

## 2024-06-17 PROCEDURE — 83690 ASSAY OF LIPASE: CPT | Performed by: EMERGENCY MEDICINE

## 2024-06-17 PROCEDURE — 96374 THER/PROPH/DIAG INJ IV PUSH: CPT

## 2024-06-17 PROCEDURE — 84484 ASSAY OF TROPONIN QUANT: CPT | Performed by: EMERGENCY MEDICINE

## 2024-06-17 PROCEDURE — 0241U HB NFCT DS VIR RESP RNA 4 TRGT: CPT | Performed by: EMERGENCY MEDICINE

## 2024-06-17 PROCEDURE — 96375 TX/PRO/DX INJ NEW DRUG ADDON: CPT

## 2024-06-17 PROCEDURE — 93005 ELECTROCARDIOGRAM TRACING: CPT

## 2024-06-17 PROCEDURE — 87040 BLOOD CULTURE FOR BACTERIA: CPT | Performed by: EMERGENCY MEDICINE

## 2024-06-17 PROCEDURE — 93010 ELECTROCARDIOGRAM REPORT: CPT | Performed by: INTERNAL MEDICINE

## 2024-06-17 RX ORDER — LANOLIN ALCOHOL/MO/W.PET/CERES
6 CREAM (GRAM) TOPICAL
Status: DISCONTINUED | OUTPATIENT
Start: 2024-06-17 | End: 2024-06-21 | Stop reason: HOSPADM

## 2024-06-17 RX ORDER — ACETAMINOPHEN 325 MG/1
650 TABLET ORAL EVERY 4 HOURS PRN
Status: DISCONTINUED | OUTPATIENT
Start: 2024-06-17 | End: 2024-06-21 | Stop reason: HOSPADM

## 2024-06-17 RX ORDER — TRAMADOL HYDROCHLORIDE 50 MG/1
50 TABLET ORAL EVERY 12 HOURS
COMMUNITY
Start: 2024-05-22 | End: 2024-06-21

## 2024-06-17 RX ORDER — AZITHROMYCIN 250 MG/1
500 TABLET, FILM COATED ORAL EVERY 24 HOURS
Status: COMPLETED | OUTPATIENT
Start: 2024-06-17 | End: 2024-06-19

## 2024-06-17 RX ORDER — HYDROXYCHLOROQUINE SULFATE 200 MG/1
200 TABLET, FILM COATED ORAL
COMMUNITY

## 2024-06-17 RX ORDER — TRAMADOL HYDROCHLORIDE 50 MG/1
50 TABLET ORAL EVERY 12 HOURS
Status: DISCONTINUED | OUTPATIENT
Start: 2024-06-17 | End: 2024-06-17

## 2024-06-17 RX ORDER — PANTOPRAZOLE SODIUM 40 MG/1
40 TABLET, DELAYED RELEASE ORAL
Status: DISCONTINUED | OUTPATIENT
Start: 2024-06-17 | End: 2024-06-21 | Stop reason: HOSPADM

## 2024-06-17 RX ORDER — LEVOTHYROXINE SODIUM 88 UG/1
88 TABLET ORAL DAILY
COMMUNITY
Start: 2023-07-13 | End: 2025-05-23

## 2024-06-17 RX ORDER — QUETIAPINE FUMARATE 25 MG/1
50 TABLET, FILM COATED ORAL
Status: DISCONTINUED | OUTPATIENT
Start: 2024-06-17 | End: 2024-06-21 | Stop reason: HOSPADM

## 2024-06-17 RX ORDER — ATORVASTATIN CALCIUM 40 MG/1
40 TABLET, FILM COATED ORAL DAILY
COMMUNITY
Start: 2024-05-23 | End: 2025-05-23

## 2024-06-17 RX ORDER — IPRATROPIUM BROMIDE AND ALBUTEROL SULFATE 2.5; .5 MG/3ML; MG/3ML
3 SOLUTION RESPIRATORY (INHALATION) ONCE
Status: COMPLETED | OUTPATIENT
Start: 2024-06-17 | End: 2024-06-17

## 2024-06-17 RX ORDER — FERROUS SULFATE 325(65) MG
325 TABLET ORAL
Status: DISCONTINUED | OUTPATIENT
Start: 2024-06-18 | End: 2024-06-17

## 2024-06-17 RX ORDER — LEVALBUTEROL INHALATION SOLUTION 1.25 MG/3ML
1.25 SOLUTION RESPIRATORY (INHALATION)
Status: DISCONTINUED | OUTPATIENT
Start: 2024-06-17 | End: 2024-06-21 | Stop reason: HOSPADM

## 2024-06-17 RX ORDER — GABAPENTIN 300 MG/1
300 CAPSULE ORAL
Status: DISCONTINUED | OUTPATIENT
Start: 2024-06-17 | End: 2024-06-21 | Stop reason: HOSPADM

## 2024-06-17 RX ORDER — GABAPENTIN 100 MG/1
100 CAPSULE ORAL
Status: DISCONTINUED | OUTPATIENT
Start: 2024-06-17 | End: 2024-06-17

## 2024-06-17 RX ORDER — HYDROXYCHLOROQUINE SULFATE 200 MG/1
200 TABLET, FILM COATED ORAL
Status: DISCONTINUED | OUTPATIENT
Start: 2024-06-18 | End: 2024-06-21 | Stop reason: HOSPADM

## 2024-06-17 RX ORDER — BUDESONIDE 0.5 MG/2ML
0.5 INHALANT ORAL
Status: DISCONTINUED | OUTPATIENT
Start: 2024-06-17 | End: 2024-06-21 | Stop reason: HOSPADM

## 2024-06-17 RX ORDER — SODIUM CHLORIDE FOR INHALATION 0.9 %
3 VIAL, NEBULIZER (ML) INHALATION
Status: DISCONTINUED | OUTPATIENT
Start: 2024-06-17 | End: 2024-06-17

## 2024-06-17 RX ORDER — ESTRADIOL 0.1 MG/G
2 CREAM VAGINAL DAILY
Status: DISCONTINUED | OUTPATIENT
Start: 2024-06-18 | End: 2024-06-17

## 2024-06-17 RX ORDER — FUROSEMIDE 20 MG/1
20 TABLET ORAL DAILY
Status: ON HOLD | COMMUNITY
End: 2024-06-17

## 2024-06-17 RX ORDER — METHYLPREDNISOLONE SODIUM SUCCINATE 40 MG/ML
40 INJECTION, POWDER, LYOPHILIZED, FOR SOLUTION INTRAMUSCULAR; INTRAVENOUS EVERY 8 HOURS
Status: DISCONTINUED | OUTPATIENT
Start: 2024-06-17 | End: 2024-06-18

## 2024-06-17 RX ORDER — RIVASTIGMINE TARTRATE 1.5 MG/1
1.5 CAPSULE ORAL 2 TIMES DAILY
COMMUNITY

## 2024-06-17 RX ORDER — ESTRADIOL 0.1 MG/G
2 CREAM VAGINAL DAILY
COMMUNITY

## 2024-06-17 RX ORDER — GABAPENTIN 300 MG/1
600 CAPSULE ORAL
Status: DISCONTINUED | OUTPATIENT
Start: 2024-06-17 | End: 2024-06-19

## 2024-06-17 RX ORDER — ALBUTEROL SULFATE 90 UG/1
2 AEROSOL, METERED RESPIRATORY (INHALATION) EVERY 6 HOURS PRN
COMMUNITY

## 2024-06-17 RX ORDER — ATORVASTATIN CALCIUM 40 MG/1
40 TABLET, FILM COATED ORAL DAILY
Status: DISCONTINUED | OUTPATIENT
Start: 2024-06-17 | End: 2024-06-21 | Stop reason: HOSPADM

## 2024-06-17 RX ORDER — RIVASTIGMINE TARTRATE 1.5 MG/1
1.5 CAPSULE ORAL 2 TIMES DAILY
Status: DISCONTINUED | OUTPATIENT
Start: 2024-06-17 | End: 2024-06-21 | Stop reason: HOSPADM

## 2024-06-17 RX ORDER — FUROSEMIDE 10 MG/ML
40 INJECTION INTRAMUSCULAR; INTRAVENOUS ONCE
Status: COMPLETED | OUTPATIENT
Start: 2024-06-17 | End: 2024-06-17

## 2024-06-17 RX ORDER — VITAMIN B COMPLEX
1 TABLET ORAL DAILY
COMMUNITY

## 2024-06-17 RX ORDER — QUETIAPINE FUMARATE 50 MG/1
50 TABLET, FILM COATED ORAL
COMMUNITY

## 2024-06-17 RX ORDER — PANTOPRAZOLE SODIUM 40 MG/1
40 TABLET, DELAYED RELEASE ORAL 2 TIMES DAILY
COMMUNITY
Start: 2024-05-23

## 2024-06-17 RX ORDER — METOPROLOL SUCCINATE 50 MG/1
50 TABLET, EXTENDED RELEASE ORAL 2 TIMES DAILY
COMMUNITY
Start: 2024-05-23

## 2024-06-17 RX ORDER — GUAIFENESIN 600 MG/1
600 TABLET, EXTENDED RELEASE ORAL EVERY 12 HOURS SCHEDULED
Status: DISCONTINUED | OUTPATIENT
Start: 2024-06-17 | End: 2024-06-21 | Stop reason: HOSPADM

## 2024-06-17 RX ORDER — LEVOTHYROXINE SODIUM 88 UG/1
88 TABLET ORAL
Status: DISCONTINUED | OUTPATIENT
Start: 2024-06-18 | End: 2024-06-21 | Stop reason: HOSPADM

## 2024-06-17 RX ORDER — PANTOPRAZOLE SODIUM 40 MG/1
40 TABLET, DELAYED RELEASE ORAL
Status: DISCONTINUED | OUTPATIENT
Start: 2024-06-17 | End: 2024-06-17

## 2024-06-17 RX ORDER — FERROUS SULFATE 325(65) MG
325 TABLET ORAL
COMMUNITY

## 2024-06-17 RX ORDER — GABAPENTIN 300 MG/1
300 CAPSULE ORAL 2 TIMES DAILY
Status: DISCONTINUED | OUTPATIENT
Start: 2024-06-17 | End: 2024-06-17

## 2024-06-17 RX ORDER — GABAPENTIN 300 MG/1
300 CAPSULE ORAL EVERY MORNING
COMMUNITY
Start: 2024-05-03

## 2024-06-17 RX ORDER — FUROSEMIDE 10 MG/ML
40 INJECTION INTRAMUSCULAR; INTRAVENOUS 2 TIMES DAILY
Status: DISCONTINUED | OUTPATIENT
Start: 2024-06-17 | End: 2024-06-18

## 2024-06-17 RX ORDER — METHYLPREDNISOLONE SODIUM SUCCINATE 125 MG/2ML
80 INJECTION, POWDER, LYOPHILIZED, FOR SOLUTION INTRAMUSCULAR; INTRAVENOUS ONCE
Status: COMPLETED | OUTPATIENT
Start: 2024-06-17 | End: 2024-06-17

## 2024-06-17 RX ORDER — METOPROLOL SUCCINATE 50 MG/1
50 TABLET, EXTENDED RELEASE ORAL EVERY 12 HOURS SCHEDULED
Status: DISCONTINUED | OUTPATIENT
Start: 2024-06-17 | End: 2024-06-21 | Stop reason: HOSPADM

## 2024-06-17 RX ORDER — GABAPENTIN 300 MG/1
300 CAPSULE ORAL
Status: DISCONTINUED | OUTPATIENT
Start: 2024-06-18 | End: 2024-06-19

## 2024-06-17 RX ADMIN — IPRATROPIUM BROMIDE 0.5 MG: 0.5 SOLUTION RESPIRATORY (INHALATION) at 20:31

## 2024-06-17 RX ADMIN — RIVASTIGMINE TARTRATE 1.5 MG: 1.5 CAPSULE ORAL at 17:10

## 2024-06-17 RX ADMIN — CARBIDOPA AND LEVODOPA 1 TABLET: 25; 100 TABLET ORAL at 16:12

## 2024-06-17 RX ADMIN — Medication 6 MG: at 21:56

## 2024-06-17 RX ADMIN — METHYLPREDNISOLONE SODIUM SUCCINATE 80 MG: 125 INJECTION, POWDER, FOR SOLUTION INTRAMUSCULAR; INTRAVENOUS at 09:15

## 2024-06-17 RX ADMIN — METOPROLOL SUCCINATE 50 MG: 50 TABLET, EXTENDED RELEASE ORAL at 21:56

## 2024-06-17 RX ADMIN — GUAIFENESIN 600 MG: 600 TABLET ORAL at 12:21

## 2024-06-17 RX ADMIN — RIVASTIGMINE TARTRATE 1.5 MG: 1.5 CAPSULE ORAL at 14:39

## 2024-06-17 RX ADMIN — IPRATROPIUM BROMIDE AND ALBUTEROL SULFATE 3 ML: 2.5; .5 SOLUTION RESPIRATORY (INHALATION) at 09:15

## 2024-06-17 RX ADMIN — BUDESONIDE 0.5 MG: 0.5 INHALANT ORAL at 20:31

## 2024-06-17 RX ADMIN — METOPROLOL SUCCINATE 50 MG: 50 TABLET, EXTENDED RELEASE ORAL at 12:21

## 2024-06-17 RX ADMIN — FUROSEMIDE 40 MG: 10 INJECTION, SOLUTION INTRAMUSCULAR; INTRAVENOUS at 10:22

## 2024-06-17 RX ADMIN — GABAPENTIN 600 MG: 300 CAPSULE ORAL at 21:54

## 2024-06-17 RX ADMIN — METHYLPREDNISOLONE SODIUM SUCCINATE 40 MG: 40 INJECTION, POWDER, FOR SOLUTION INTRAMUSCULAR; INTRAVENOUS at 21:54

## 2024-06-17 RX ADMIN — FUROSEMIDE 40 MG: 10 INJECTION, SOLUTION INTRAMUSCULAR; INTRAVENOUS at 16:12

## 2024-06-17 RX ADMIN — IPRATROPIUM BROMIDE 0.5 MG: 0.5 SOLUTION RESPIRATORY (INHALATION) at 13:53

## 2024-06-17 RX ADMIN — QUETIAPINE FUMARATE 50 MG: 25 TABLET ORAL at 21:55

## 2024-06-17 RX ADMIN — ATORVASTATIN CALCIUM 40 MG: 40 TABLET, FILM COATED ORAL at 12:21

## 2024-06-17 RX ADMIN — LEVALBUTEROL HYDROCHLORIDE 1.25 MG: 1.25 SOLUTION RESPIRATORY (INHALATION) at 13:53

## 2024-06-17 RX ADMIN — PANTOPRAZOLE SODIUM 40 MG: 40 TABLET, DELAYED RELEASE ORAL at 16:12

## 2024-06-17 RX ADMIN — GUAIFENESIN 600 MG: 600 TABLET ORAL at 21:54

## 2024-06-17 RX ADMIN — AZITHROMYCIN 500 MG: 250 TABLET, FILM COATED ORAL at 12:21

## 2024-06-17 RX ADMIN — CARBIDOPA AND LEVODOPA 1 TABLET: 25; 100 TABLET ORAL at 21:58

## 2024-06-17 RX ADMIN — GABAPENTIN 300 MG: 300 CAPSULE ORAL at 14:39

## 2024-06-17 RX ADMIN — LEVALBUTEROL HYDROCHLORIDE 1.25 MG: 1.25 SOLUTION RESPIRATORY (INHALATION) at 20:31

## 2024-06-17 NOTE — ED PROVIDER NOTES
History  Chief Complaint   Patient presents with    Shortness of Breath     Patient reports for 4 days with no improvement. Pain in abdomen with work of breathing. Denies CP.       History provided by:  Medical records, patient and relative (Son)  Shortness of Breath  Severity:  Moderate  Onset quality:  Gradual  Duration:  2 days  Timing:  Constant  Progression:  Unchanged  Chronicity:  Recurrent  Context comment:  Patient with 2 days of generalized weakness, cough, shortness of breath mainly with exertion  Relieved by:  Nothing  Worsened by:  Nothing  Ineffective treatments:  Oxygen  Associated symptoms: chest pain and cough    Associated symptoms: no abdominal pain, no claudication, no diaphoresis, no ear pain, no fever, no headaches, no hemoptysis, no neck pain, no PND, no rash, no sore throat, no sputum production, no syncope, no swollen glands, no vomiting and no wheezing    Associated symptoms comment:  Anterior rib pain mainly with coughing  Risk factors: tobacco use    Risk factors comment:  Remote history of smoker, quit 30 years ago      Prior to Admission Medications   Prescriptions Last Dose Informant Patient Reported? Taking?   QUEtiapine (SEROquel) 50 mg tablet   Yes Yes   Sig: Take 50 mg by mouth daily at bedtime   albuterol (PROVENTIL HFA,VENTOLIN HFA) 90 mcg/act inhaler   Yes Yes   Sig: Inhale 2 puffs every 6 (six) hours as needed for wheezing   atorvastatin (LIPITOR) 40 mg tablet   Yes Yes   Sig: Take 40 mg by mouth daily   carbidopa-levodopa (SINEMET)  mg per tablet   Yes Yes   Sig: Take 1 tablet by mouth 3 (three) times a day   cholecalciferol (VITAMIN D3) 25 mcg (1,000 units) tablet Not Taking  Yes No   Sig: Take 1 tablet by mouth daily   Patient not taking: Reported on 6/17/2024   estradiol (ESTRACE) 0.1 mg/g vaginal cream Not Taking  Yes No   Sig: Insert 2 g into the vagina daily   Patient not taking: Reported on 6/17/2024   ferrous sulfate 325 (65 Fe) mg tablet Not Taking  Yes No    Sig: Take 325 mg by mouth daily with breakfast   Patient not taking: Reported on 6/17/2024   gabapentin (NEURONTIN) 300 mg capsule   Yes Yes   Sig: Take 300 mg by mouth every morning 300 mg afternoon and 600 mg at night   hydroxychloroquine (PLAQUENIL) 200 mg tablet   Yes Yes   Sig: Take 200 mg by mouth daily with breakfast   levothyroxine 88 mcg tablet   Yes Yes   Sig: Take 88 mcg by mouth daily   metoprolol succinate (TOPROL-XL) 50 mg 24 hr tablet   Yes Yes   Sig: Take 50 mg by mouth 2 (two) times a day   pantoprazole (PROTONIX) 40 mg tablet   Yes Yes   Sig: Take 40 mg by mouth 2 (two) times a day   rivastigmine (EXELON) 1.5 mg capsule   Yes Yes   Sig: Take 1.5 mg by mouth 2 (two) times a day   traMADol (ULTRAM) 50 mg tablet Not Taking  Yes No   Sig: Take 50 mg by mouth Every 12 hours   Patient not taking: Reported on 6/17/2024      Facility-Administered Medications: None       Past Medical History:   Diagnosis Date    A-fib (HCC)     CHF (congestive heart failure) (HCC)     COPD (chronic obstructive pulmonary disease) (HCC)     Coronary artery disease     Disease of thyroid gland     Hypertension     MI, old     Pancytopenia (HCC)     Renal disorder     Stroke (HCC)        Past Surgical History:   Procedure Laterality Date    CARDIAC TAVR         History reviewed. No pertinent family history.  I have reviewed and agree with the history as documented.    E-Cigarette/Vaping    E-Cigarette Use Never User      E-Cigarette/Vaping Substances     Social History     Tobacco Use    Smoking status: Never    Smokeless tobacco: Never   Vaping Use    Vaping status: Never Used   Substance Use Topics    Alcohol use: Not Currently    Drug use: Never       Review of Systems   Constitutional:  Positive for activity change and fatigue. Negative for appetite change, chills, diaphoresis and fever.   HENT:  Negative for ear discharge, ear pain, rhinorrhea and sore throat.    Eyes:  Negative for pain and visual disturbance.    Respiratory:  Positive for cough and shortness of breath. Negative for hemoptysis, sputum production, chest tightness and wheezing.    Cardiovascular:  Positive for chest pain. Negative for palpitations, claudication, syncope and PND.        Chest pain with cough   Gastrointestinal:  Negative for abdominal pain, diarrhea, nausea and vomiting.   Endocrine: Negative for polydipsia, polyphagia and polyuria.   Genitourinary:  Negative for difficulty urinating, dysuria, flank pain and hematuria.   Musculoskeletal:  Negative for arthralgias, back pain and neck pain.   Skin:  Negative for color change and rash.   Allergic/Immunologic: Negative for immunocompromised state.   Neurological:  Positive for weakness. Negative for dizziness, seizures, syncope and headaches.   Psychiatric/Behavioral:  Negative for confusion and self-injury. The patient is not nervous/anxious.    All other systems reviewed and are negative.      Physical Exam  Physical Exam  Vitals and nursing note reviewed.   Constitutional:       General: She is not in acute distress.     Appearance: Normal appearance. She is not ill-appearing, toxic-appearing or diaphoretic.   HENT:      Head: Normocephalic and atraumatic.      Nose: Nose normal. No congestion or rhinorrhea.      Mouth/Throat:      Mouth: Mucous membranes are moist.      Pharynx: Oropharynx is clear. No oropharyngeal exudate or posterior oropharyngeal erythema.   Eyes:      General:         Right eye: No discharge.         Left eye: No discharge.      Extraocular Movements: Extraocular movements intact.      Pupils: Pupils are equal, round, and reactive to light.   Cardiovascular:      Rate and Rhythm: Normal rate and regular rhythm.      Pulses: Normal pulses.      Heart sounds: Normal heart sounds. No murmur heard.     No gallop.   Pulmonary:      Effort: Tachypnea, accessory muscle usage and respiratory distress present.      Breath sounds: No stridor. Examination of the right-upper field  reveals wheezing. Examination of the left-upper field reveals wheezing. Examination of the right-middle field reveals wheezing. Examination of the left-middle field reveals wheezing. Examination of the right-lower field reveals wheezing. Examination of the left-lower field reveals wheezing. Wheezing present. No rhonchi or rales.      Comments: Diffuse expiratory wheezing mild, mild accessory muscle use, mild tachypnea  Chest:      Chest wall: No tenderness.   Abdominal:      General: Bowel sounds are normal. There is no distension.      Palpations: Abdomen is soft. There is no mass.      Tenderness: There is no abdominal tenderness. There is no right CVA tenderness, left CVA tenderness, guarding or rebound.      Hernia: No hernia is present.   Musculoskeletal:         General: Normal range of motion.      Cervical back: Normal range of motion and neck supple.      Right lower leg: No edema.      Left lower leg: No edema.   Skin:     General: Skin is warm and dry.      Capillary Refill: Capillary refill takes less than 2 seconds.      Findings: Ecchymosis present.      Comments: Different stages of ecchymosis to the bilateral lower legs   Neurological:      General: No focal deficit present.      Mental Status: She is alert and oriented to person, place, and time.      Cranial Nerves: No cranial nerve deficit.      Sensory: No sensory deficit.      Motor: Weakness present.      Coordination: Coordination normal.      Gait: Gait normal.      Deep Tendon Reflexes: Reflexes normal.      Comments: Generalized weakness, no focal weakness   Psychiatric:         Mood and Affect: Mood normal.         Behavior: Behavior normal.         Thought Content: Thought content normal.         Judgment: Judgment normal.         Vital Signs  ED Triage Vitals   Temperature Pulse Respirations Blood Pressure SpO2   06/17/24 0842 06/17/24 0842 06/17/24 0842 06/17/24 0842 06/17/24 0842   97.8 °F (36.6 °C) (!) 118 (!) 23 (!) 188/90 (!) 88 %       Temp Source Heart Rate Source Patient Position - Orthostatic VS BP Location FiO2 (%)   06/17/24 0842 06/17/24 0842 06/17/24 0842 -- --   Temporal Monitor Lying        Pain Score       06/17/24 1441       No Pain           Vitals:    06/17/24 1015 06/17/24 1030 06/17/24 1100 06/17/24 1441   BP:  (!) 189/82 (!) 172/82 160/80   Pulse: (!) 106 87 87 (!) 106   Patient Position - Orthostatic VS:             Visual Acuity      ED Medications  Medications   atorvastatin (LIPITOR) tablet 40 mg (40 mg Oral Given 6/17/24 1221)   levothyroxine tablet 88 mcg (has no administration in time range)   metoprolol succinate (TOPROL-XL) 24 hr tablet 50 mg (50 mg Oral Given 6/17/24 1221)   acetaminophen (TYLENOL) tablet 650 mg (has no administration in time range)   furosemide (LASIX) injection 40 mg (has no administration in time range)   guaiFENesin (MUCINEX) 12 hr tablet 600 mg (600 mg Oral Given 6/17/24 1221)   levalbuterol (XOPENEX) inhalation solution 1.25 mg (1.25 mg Nebulization Given 6/17/24 1353)   ipratropium (ATROVENT) 0.02 % inhalation solution 0.5 mg (0.5 mg Nebulization Given 6/17/24 1353)   budesonide (PULMICORT) inhalation solution 0.5 mg (0.5 mg Nebulization Not Given 6/17/24 1353)   methylPREDNISolone sodium succinate (Solu-MEDROL) injection 40 mg (has no administration in time range)   azithromycin (ZITHROMAX) tablet 500 mg (500 mg Oral Given 6/17/24 1221)   gabapentin (NEURONTIN) capsule 300 mg (has no administration in time range)   gabapentin (NEURONTIN) capsule 600 mg (has no administration in time range)   gabapentin (NEURONTIN) capsule 300 mg (300 mg Oral Given 6/17/24 1439)   aspirin (ECOTRIN LOW STRENGTH) EC tablet 81 mg (has no administration in time range)   pantoprazole (PROTONIX) EC tablet 40 mg (has no administration in time range)   carbidopa-levodopa (SINEMET)  mg per tablet 1 tablet (has no administration in time range)   hydroxychloroquine (PLAQUENIL) tablet 200 mg (has no administration  in time range)   QUEtiapine (SEROquel) tablet 50 mg (has no administration in time range)   rivastigmine (EXELON) capsule 1.5 mg (1.5 mg Oral Given 6/17/24 1439)   ipratropium-albuterol (DUO-NEB) 0.5-2.5 mg/3 mL inhalation solution 3 mL (3 mL Nebulization Given 6/17/24 0915)   methylPREDNISolone sodium succinate (Solu-MEDROL) injection 80 mg (80 mg Intravenous Given 6/17/24 0915)   furosemide (LASIX) injection 40 mg (40 mg Intravenous Given 6/17/24 1022)       Diagnostic Studies  Results Reviewed       Procedure Component Value Units Date/Time    HS Troponin I 4hr [[id number]]  (Abnormal) Collected: 06/17/24 1431    Lab Status: Final result Specimen: Blood from Arm, Right Updated: 06/17/24 1501     hs TnI 4hr 102 ng/L      Delta 4hr hsTnI 9 ng/L     Blood culture #1 [[id number]] Collected: 06/17/24 0912    Lab Status: Preliminary result Specimen: Blood from Arm, Right Updated: 06/17/24 1401     Blood Culture Received in Microbiology Lab. Culture in Progress.    Blood culture #2 [[id number]] Collected: 06/17/24 0905    Lab Status: Preliminary result Specimen: Blood from Arm, Left Updated: 06/17/24 1401     Blood Culture Received in Microbiology Lab. Culture in Progress.    HS Troponin I 2hr [[id number]]  (Abnormal) Collected: 06/17/24 1112    Lab Status: Final result Specimen: Blood from Arm, Left Updated: 06/17/24 1139     hs TnI 2hr 102 ng/L      Delta 2hr hsTnI 9 ng/L     UA w Reflex to Microscopic w Reflex to Culture [[id number]] Collected: 06/17/24 1120    Lab Status: Final result Specimen: Urine, Clean Catch Updated: 06/17/24 1128     Color, UA Yellow     Clarity, UA Clear     Specific Gravity, UA 1.010     pH, UA 6.5     Leukocytes, UA Negative     Nitrite, UA Negative     Protein, UA Negative mg/dl      Glucose, UA Negative mg/dl      Ketones, UA Negative mg/dl      Urobilinogen, UA 0.2 E.U./dl      Bilirubin, UA Negative     Occult Blood, UA Negative    CBC and differential [[id number]]  (Abnormal) Collected:  06/17/24 0906    Lab Status: Final result Specimen: Blood from Arm, Left Updated: 06/17/24 1003     WBC 1.88 Thousand/uL      RBC 3.04 Million/uL      Hemoglobin 9.6 g/dL      Hematocrit 32.3 %       fL      MCH 31.6 pg      MCHC 29.7 g/dL      RDW 16.8 %      MPV 11.7 fL      Platelets 68 Thousands/uL      nRBC 0 /100 WBCs      Segmented % 58 %      Immature Grans % 1 %      Lymphocytes % 27 %      Monocytes % 11 %      Eosinophils Relative 3 %      Basophils Relative 0 %      Absolute Neutrophils 1.11 Thousands/µL      Absolute Immature Grans 0.01 Thousand/uL      Absolute Lymphocytes 0.50 Thousands/µL      Absolute Monocytes 0.21 Thousand/µL      Eosinophils Absolute 0.05 Thousand/µL      Basophils Absolute 0.00 Thousands/µL     Narrative:      This is an appended report.  These results have been appended to a previously verified report.    Smear Review(Phlebs Do Not Order) [[id number]]  (Abnormal) Collected: 06/17/24 0906    Lab Status: Final result Specimen: Blood from Arm, Left Updated: 06/17/24 1003     RBC Morphology Present     Platelet Estimate Decreased     Anisocytosis Present     Microcytes Present    FLU/RSV/COVID - if FLU/RSV clinically relevant [[id number]]  (Normal) Collected: 06/17/24 0905    Lab Status: Final result Specimen: Nares from Nose Updated: 06/17/24 0956     SARS-CoV-2 Negative     INFLUENZA A PCR Negative     INFLUENZA B PCR Negative     RSV PCR Negative    Narrative:      FOR PEDIATRIC PATIENTS - copy/paste COVID Guidelines URL to browser: [email or web address]    SARS-CoV-2 assay is a Nucleic Acid Amplification assay intended for the  qualitative detection of nucleic acid from SARS-CoV-2 in nasopharyngeal  swabs. Results are for the presumptive identification of SARS-CoV-2 RNA.    Positive results are indicative of infection with SARS-CoV-2, the virus  causing COVID-19, but do not rule out bacterial infection or  co-infection  with other viruses. Laboratories within the [place] and its  territories are required to report all positive results to the appropriate  public health authorities. Negative results do not preclude SARS-CoV-2  infection and should not be used as the sole basis for treatment or other  patient management decisions. Negative results must be combined with  clinical observations, patient history, and epidemiological information.  This test has not been FDA cleared or approved.    This test has been authorized by FDA under an Emergency Use Authorization  (EUA). This test is only authorized for the duration of time the  declaration that circumstances exist justifying the authorization of the  emergency use of an in vitro diagnostic tests for detection of SARS-CoV-2  virus and/or diagnosis of COVID-19 infection under section 564(b)(1) of  the Act, 21 U.S.C. 360bbb-3(b)(1), unless the authorization is terminated  or revoked sooner. The test has been validated but independent review by FDA  and CLIA is pending.    Test performed using [employer] GeneXpert: This RT-PCR assay targets N2,  a region unique to SARS-CoV-2. A conserved region in the E-gene was chosen  for pan-Sarbecovirus detection which includes SARS-CoV-2.    According to CMS-2020-01-R, this platform meets the definition of high-throughput technology.    HS Troponin 0hr (reflex protocol) [[id number]]  (Abnormal) Collected: 06/17/24 0905    Lab Status: Final result Specimen: Blood from Arm, Left Updated: 06/17/24 0942     hs TnI 0hr 93 ng/L     B-Type Natriuretic Peptide(BNP) [[id number]]  (Abnormal) Collected: 06/17/24 0905    Lab Status: Final result Specimen: Blood from Arm, Left Updated: 06/17/24 0940     BNP 1,361 pg/mL     Comprehensive metabolic panel [[id number]]  (Abnormal) Collected: 06/17/24 0905    Lab Status: Final result Specimen: Blood from Arm, Left Updated: 06/17/24 0937     Sodium 139 mmol/L      Potassium 4.5 mmol/L      Chloride 108  mmol/L      CO2 23 mmol/L      ANION GAP 8 mmol/L      BUN 27 mg/dL      Creatinine 1.18 mg/dL      Glucose 87 mg/dL      Calcium 8.8 mg/dL      AST 32 U/L      ALT 14 U/L      Alkaline Phosphatase 68 U/L      Total Protein 7.6 g/dL      Albumin 3.6 g/dL      Total Bilirubin 0.64 mg/dL      eGFR 41 ml/min/1.73sq m     Narrative:      National Kidney Disease Foundation guidelines for Chronic Kidney Disease (CKD):     Stage 1 with normal or high GFR (GFR > 90 mL/min/1.73 square meters)    Stage 2 Mild CKD (GFR = 60-89 mL/min/1.73 square meters)    Stage 3A Moderate CKD (GFR = 45-59 mL/min/1.73 square meters)    Stage 3B Moderate CKD (GFR = 30-44 mL/min/1.73 square meters)    Stage 4 Severe CKD (GFR = 15-29 mL/min/1.73 square meters)    Stage 5 End Stage CKD (GFR <15 mL/min/1.73 square meters)  Note: GFR calculation is accurate only with a steady state creatinine    Lactic acid, plasma (w/reflex if result > 2.0) [[id number]]  (Normal) Collected: 06/17/24 0905    Lab Status: Final result Specimen: Blood from Arm, Left Updated: 06/17/24 0937     LACTIC ACID 1.5 mmol/L     Narrative:      Result may be elevated if tourniquet was used during collection.    Lipase [[id number]]  (Normal) Collected: 06/17/24 0905    Lab Status: Final result Specimen: Blood from Arm, Left Updated: 06/17/24 0937     Lipase 66 u/L     Blood gas, venous [[id number]]  (Abnormal) Collected: 06/17/24 0905    Lab Status: Final result Specimen: Blood from Arm, Left Updated: 06/17/24 0934     pH, Jose 7.375     pCO2, Jose 44.7 mm Hg      pO2, Jose 27.1 mm Hg      HCO3, Jose 25.6 mmol/L      Base Excess, Jose 0.2 mmol/L      O2 Content, Jose 6.3 ml/dL      O2 HGB, VENOUS 44.1 %                    XR chest 1 view portable   Final Result by Gloria Perry MD (06/17 1045)      Suspected interstitial pulmonary edema and trace bilateral pleural fluid.            Workstation performed: [id number]                    Procedures  Procedures         ED Course                                SBIRT 22yo+      Flowsheet Row Most Recent Value   Initial Alcohol Screen: US AUDIT-C     1. How often do you have a drink containing alcohol? 0 Filed at: 06/17/2024 0845   2. How many drinks containing alcohol do you have on a typical day you are drinking?  0 Filed at: 06/17/2024 0845   3a. Male UNDER 65: How often do you have five or more drinks on one occasion? 0 Filed at: 06/17/2024 0845   3b. FEMALE Any Age, or MALE 65+: How often do you have 4 or more drinks on one occassion? 0 Filed at: 06/17/2024 0845   Audit-C Score 0 Filed at: 06/17/2024 0845   DIONY: How many times in the past year have you...    Used an illegal drug or used a prescription medication for non-medical reasons? Never Filed at: 06/17/2024 0845                      Medical Decision Making  0848: Patient appears chronically ill, vital signs reviewed.  Patient reports generalized weakness and increased dyspnea with exertion.  History of chronic lung disease, on oxygen use at home.  Patient is reporting some mild cough, rib pain with cough.  Placed on the monitor.  History of pneumonia previously.  Plan to complete basic labs including VBG, cardiac enzymes, BNP, lactic acid.  Check chest x-ray.  Previous CT chest abdomen pelvis reviewed.    1021: Chest x-ray and labs reviewed.  Patient with chronic mild troponinemia and pancytopenia.  BNP significantly elevated compared to baseline.  Plan to give Lasix.  I will consult with hospitalist for admission.    Amount and/or Complexity of Data Reviewed  External Data Reviewed: labs, radiology and notes.     Details: CT chest abdomen pelvis February 2024--no acute pathology    CTA chest 2023--no PE      Labs: ordered.  Radiology: ordered.     Details: Chest x-ray--perihilar vascular congestion  ECG/medicine tests: ordered and independent interpretation performed.     Details: Sinus tachycardia 105 bpm, left bundle branch block, no acute ischemia    Risk  Prescription drug  management.  Decision regarding hospitalization.             Disposition  Final diagnoses:   Acute on chronic congestive heart failure, unspecified heart failure type (HCC)   Weakness   Pancytopenia (HCC)     Time reflects when diagnosis was documented in both MDM as applicable and the Disposition within this note       Time User Action Codes Description Comment    6/17/2024 10:15 AM Ritchie Lebron [I50.9] Acute on chronic congestive heart failure, unspecified heart failure type (HCC)     6/17/2024 10:16 AM Ritchie Lebron Add [R53.1] Weakness     6/17/2024 10:16 AM Ritchie Lebron [D61.818] Pancytopenia (HCC)           ED Disposition       ED Disposition   Admit    Condition   Stable    Date/Time   Mon Jun 17, 2024 1015    Comment                  Follow-up Information    None         Current Discharge Medication List        CONTINUE these medications which have NOT CHANGED    Details   albuterol (PROVENTIL HFA,VENTOLIN HFA) 90 mcg/act inhaler Inhale 2 puffs every 6 (six) hours as needed for wheezing      atorvastatin (LIPITOR) 40 mg tablet Take 40 mg by mouth daily      carbidopa-levodopa (SINEMET)  mg per tablet Take 1 tablet by mouth 3 (three) times a day      gabapentin (NEURONTIN) 300 mg capsule Take 300 mg by mouth every morning 300 mg afternoon and 600 mg at night      hydroxychloroquine (PLAQUENIL) 200 mg tablet Take 200 mg by mouth daily with breakfast      levothyroxine 88 mcg tablet Take 88 mcg by mouth daily      metoprolol succinate (TOPROL-XL) 50 mg 24 hr tablet Take 50 mg by mouth 2 (two) times a day      pantoprazole (PROTONIX) 40 mg tablet Take 40 mg by mouth 2 (two) times a day      QUEtiapine (SEROquel) 50 mg tablet Take 50 mg by mouth daily at bedtime      rivastigmine (EXELON) 1.5 mg capsule Take 1.5 mg by mouth 2 (two) times a day      cholecalciferol (VITAMIN D3) 25 mcg (1,000 units) tablet Take 1 tablet by mouth daily      estradiol (ESTRACE) 0.1 mg/g vaginal cream Insert 2 g  into the vagina daily      ferrous sulfate 325 (65 Fe) mg tablet Take 325 mg by mouth daily with breakfast      traMADol (ULTRAM) 50 mg tablet Take 50 mg by mouth Every 12 hours             No discharge procedures on file.    PDMP Review       None            ED Provider  Electronically Signed by             Ritchie Lebron MD  06/17/24 1511       Ritchie Lebron MD  06/17/24 0823

## 2024-06-17 NOTE — PLAN OF CARE
Problem: INFECTION - ADULT  Goal: Absence or prevention of progression during hospitalization  Description: INTERVENTIONS:  - Assess and monitor for signs and symptoms of infection  - Monitor lab/diagnostic results  - Monitor all insertion sites, i.e. indwelling lines, tubes, and drains  - Monitor endotracheal if appropriate and nasal secretions for changes in amount and color  - [place] appropriate cooling/warming therapies per order  - Administer medications as ordered  - Instruct and encourage patient and family to use good hand hygiene technique  - Identify and instruct in appropriate isolation precautions for identified infection/condition  Outcome: Progressing

## 2024-06-17 NOTE — H&P
[facility]  H&P  Name: Radha Engel 88 y.o. female I MRN: [id number]  Unit/Bed#: -01 I Date of Admission: 6/17/2024   Date of Service: 6/17/2024 I Hospital Day: 0      Assessment & Plan   * Acute on chronic combined systolic and diastolic congestive heart failure (HCC)  Assessment & Plan  Wt Readings from Last 3 Encounters:   06/17/24 51 kg (112 lb 7 oz)   Shortness of breath elevation of proBNP chest x-ray compliant with CHF 2D echo done in April showed EF of 30 to 35% diastolic dysfunction.  She is only on Lasix as needed  Start Lasix 40 mg IV twice daily 1.8 L fluid restriction Daily weights I and O  Monitor BMP            CAD (coronary artery disease)  Assessment & Plan  S/p stent   TAVR  Medical meds    CREST syndrome (HCC)  Assessment & Plan  plaquenil    Parkinsons  Assessment & Plan  Continue Sinemet and Exelon    Paroxysmal atrial fibrillation (HCC)  Assessment & Plan  She is not on anticoagulation offered Watchman declined she is only on aspirin and currently metoprolol    Pancytopenia (HCC)  Assessment & Plan  Chronic follows with hematology decline workup in the past.  For now we will hold DVT prophylaxis secondary to platelets being 68,000    Chronic respiratory failure with hypoxia (HCC)  Assessment & Plan  Chronically on 2 liters continuously     Chronic obstructive pulmonary disease with acute exacerbation (HCC)  Assessment & Plan  Started on Medrol 40 mg IV every 8 out nebulizer treatment and Pulmicort twice daily.  Zithromax for 3 days           VTE Pharmacologic Prophylaxis: VTE Score: 4 Moderate Risk (Score 3-4) - Pharmacological DVT Prophylaxis Contraindicated. Sequential Compression Devices Ordered.  Code Status: dnr/dni  Discussion with family: Updated  (son) at bedside.    Anticipated Length of Stay: Patient will be admitted on an inpatient basis with an anticipated length of stay of greater than 2 midnights secondary to secondary to COPD  exacerbation and acute on chronic systolic and diastolic heart failure exacerbation.    Total Time Spent on Date of Encounter in care of patient: >45 mins. This time was spent on one or more of the following: performing physical exam; counseling and coordination of care; obtaining or reviewing history; documenting in the medical record; reviewing/ordering tests, medications or procedures; communicating with other healthcare professionals and discussing with patient's family/caregivers.    Chief Complaint: Shortness of breath    History of Present Illness:  Radha Engel is a 88 y.o. female with a PMH of atrial fibrillation chronic pancytopenia CHF who presents with worsening shortness of breath and cough nonproductive for the past 4 days also stomach pains when she takes a deep breath no chest pain.  She is on diuretics as needed only when her legs swell up.  Denies any leg swelling..    Review of Systems:  Review of Systems   Constitutional:  Negative for chills and fever.   HENT:  Negative for ear pain and sore throat.    Eyes:  Negative for pain and visual disturbance.   Respiratory:  Positive for cough and shortness of breath.    Cardiovascular:  Negative for chest pain and palpitations.   Gastrointestinal:  Positive for abdominal pain. Negative for vomiting.   Genitourinary:  Negative for dysuria and hematuria.   Musculoskeletal:  Negative for arthralgias and back pain.   Skin:  Negative for color change and rash.   Neurological:  Negative for seizures and syncope.   All other systems reviewed and are negative.      Past Medical and Surgical History:   Past Medical History:   Diagnosis Date    A-fib (HCC)     CHF (congestive heart failure) (HCC)     COPD (chronic obstructive pulmonary disease) (HCC)     Coronary artery disease     Disease of thyroid gland     Hypertension     MI, old     Pancytopenia (HCC)     Renal disorder     Stroke (HCC)        Past Surgical History:   Procedure Laterality Date     CARDIAC TAVR         Meds/Allergies:  Prior to Admission medications    Medication Sig Start Date End Date Taking? Authorizing Provider   albuterol (PROVENTIL HFA,VENTOLIN HFA) 90 mcg/act inhaler Inhale 2 puffs every 6 (six) hours as needed for wheezing   Yes Historical Provider, MD   atorvastatin (LIPITOR) 40 mg tablet Take 40 mg by mouth daily 5/23/24 5/23/25 Yes Historical Provider, MD   estradiol (ESTRACE) 0.1 mg/g vaginal cream Insert 2 g into the vagina daily   Yes Historical Provider, MD   ferrous sulfate 325 (65 Fe) mg tablet Take 325 mg by mouth daily with breakfast   Yes Historical Provider, MD   gabapentin (NEURONTIN) 300 mg capsule Take 300 mg by mouth 2 (two) times a day 5/3/24  Yes Historical Provider, MD   levothyroxine 88 mcg tablet Take 88 mcg by mouth daily 7/13/23 5/23/25 Yes Historical Provider, MD   metoprolol succinate (TOPROL-XL) 50 mg 24 hr tablet Take 50 mg by mouth 2 (two) times a day 5/23/24  Yes Historical Provider, MD   pantoprazole (PROTONIX) 40 mg tablet Take 40 mg by mouth 2 (two) times a day 5/23/24  Yes Historical Provider, MD   traMADol (ULTRAM) 50 mg tablet Take 50 mg by mouth Every 12 hours 5/22/24  Yes Historical Provider, MD   cholecalciferol (VITAMIN D3) 25 mcg (1,000 units) tablet Take 1 tablet by mouth daily    Historical Provider, MD   furosemide (LASIX) 20 mg tablet Take 20 mg by mouth daily    Historical Provider, MD     I have reviewed home medications with patient personally.    Allergies: No Known Allergies    Social History:  Marital Status:    Substance Use History:   Social History     Substance and Sexual Activity   Alcohol Use Not Currently     Social History     Tobacco Use   Smoking Status Never   Smokeless Tobacco Never     Social History     Substance and Sexual Activity   Drug Use Never       Family History:  History reviewed. No pertinent family history.    Physical Exam:     Vitals:   Blood Pressure: (!) 172/82 (06/17/24 1100)  Pulse: 87 (06/17/24  1100)  Temperature: 97.8 °F (36.6 °C) (06/17/24 0842)  Temp Source: Temporal (06/17/24 0842)  Respirations: 20 (06/17/24 1100)  Height: 5' (152.4 cm) (06/17/24 1138)  Weight - Scale: 51 kg (112 lb 7 oz) (06/17/24 1215)  SpO2: 97 % (06/17/24 1100)    Physical Exam  Vitals and nursing note reviewed.   Constitutional:       General: She is not in acute distress.     Appearance: She is well-developed.   HENT:      Head: Normocephalic and atraumatic.   Eyes:      Conjunctiva/sclera: Conjunctivae normal.   Cardiovascular:      Rate and Rhythm: Normal rate and regular rhythm.      Heart sounds: No murmur heard.  Pulmonary:      Effort: Pulmonary effort is normal. No respiratory distress.      Breath sounds: Wheezing and rales present.   Abdominal:      General: Bowel sounds are normal. There is no distension.      Palpations: Abdomen is soft.      Tenderness: There is no abdominal tenderness.   Musculoskeletal:         General: No swelling.      Cervical back: Neck supple.   Skin:     General: Skin is warm and dry.      Capillary Refill: Capillary refill takes less than 2 seconds.      Findings: Bruising present.   Neurological:      Mental Status: She is alert and oriented to person, place, and time.   Psychiatric:         Mood and Affect: Mood normal.          Additional Data:     Lab Results:  Results from last 7 days   Lab Units 06/17/24  0906   WBC Thousand/uL 1.88*   HEMOGLOBIN g/dL 9.6*   HEMATOCRIT % 32.3*   PLATELETS Thousands/uL 68*   SEGS PCT % 58   LYMPHO PCT % 27   MONO PCT % 11   EOS PCT % 3     Results from last 7 days   Lab Units 06/17/24  0905   SODIUM mmol/L 139   POTASSIUM mmol/L 4.5   CHLORIDE mmol/L 108   CO2 mmol/L 23   BUN mg/dL 27*   CREATININE mg/dL 1.18   ANION GAP mmol/L 8   CALCIUM mg/dL 8.8   ALBUMIN g/dL 3.6   TOTAL BILIRUBIN mg/dL 0.64   ALK PHOS U/L 68   ALT U/L 14   AST U/L 32   GLUCOSE RANDOM mg/dL 87             Lab Results   Component Value Date    HGBA1C 4.8 04/10/2024    HGBA1C 5.0  04/09/2024     Results from last 7 days   Lab Units 06/17/24  0905   LACTIC ACID mmol/L 1.5       Lines/Drains:  Invasive Devices       Peripheral Intravenous Line  Duration             Peripheral IV 06/17/24 Left;Proximal;Ventral (anterior) Forearm <1 day                        Imaging: Reviewed radiology reports from this admission including: chest xray  XR chest 1 view portable   Final Result by Gloria Perry MD (06/17 1045)      Suspected interstitial pulmonary edema and trace bilateral pleural fluid.            Workstation performed: [id number]             EKG and Other Studies Reviewed on Admission:   EKG: No EKG obtained. Will order    ** Please Note: This note has been constructed using a voice recognition system. **

## 2024-06-17 NOTE — ASSESSMENT & PLAN NOTE
Wt Readings from Last 3 Encounters:   06/17/24 51 kg (112 lb 7 oz)   Shortness of breath elevation of proBNP chest x-ray compliant with CHF 2D echo done in April showed EF of 30 to 35% diastolic dysfunction.  She is only on Lasix as needed  Start Lasix 40 mg IV twice daily 1.8 L fluid restriction Daily weights I and O  Monitor BMP

## 2024-06-17 NOTE — CASE MANAGEMENT
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 failure (HCC) 2024     Chronic obstructive pulmonary disease with acute exacerbation (HCC) 2024     Chronic respiratory failure with hypoxia (HCC) 2024     Pancytopenia (HCC) 2024     Paroxysmal atrial fibrillation (HCC) 2024     Parkinsons 2024     CREST syndrome (HCC) 2024     CAD (coronary artery disease) 2024       LOS (days): 0  Geometric Mean LOS (GMLOS) (days):   Days to GMLOS:     OBJECTIVE:    Risk of Unplanned Readmission Score: 12.68         Current admission status: Inpatient  Referral Reason: Other    Preferred Pharmacy:   [id number] [address] [address]  Phone: [phone] Fax: [phone]    Primary Care Provider: Carlos Somers DO    Primary Insurance: MEDICARE  Secondary Insurance: BLUE CROSS    ASSESSMENT:  Active Health Care Proxies    There are no active Health Care Proxies on file.       Advance Directives  Does patient have a Health Care POA?: Yes  Does patient have Advance Directives?: Yes  Advance Directives: Living will, Power of  for health care  Primary Contact: Negrito Engel (Son)  [phone]         Readmission Root Cause  30 Day Readmission: No    Patient Information  Admitted from:: Home  Mental Status: Alert  During Assessment patient was accompanied by: Not accompanied during assessment  Assessment information provided by:: Patient  Primary Caregiver: Self  Support Systems: Children  County of Residence:  [place]  What [facility] do you live in?: [place]  Home entry access options. Select all that apply.: Stairs  Number of steps to enter home.: 1  Do the steps have railings?: No  Type of Current Residence: Bi-level  Upon entering residence, is there a bedroom on the main floor (no further steps)?: No  A bedroom is located on the following floor levels of residence (select all that apply):: 2nd Floor  Upon entering residence, is there a bathroom on the main floor (no further steps)?: No  Indicate which floors of current residence have a bathroom (select all the apply):: 2nd Floor  Number of steps to 2nd floor from main floor: 4  Living Arrangements: Lives w/ Son  Is patient a ?: No    Activities of Daily Living Prior to Admission  Functional Status: Independent  Completes ADLs independently?: Yes  Ambulates independently?: Yes  Does patient use assisted devices?: Yes  Assisted Devices (DME) used: Walker, Bedside Commode, Shower Chair, Home Oxygen concentrator, Portable Oxygen concentrator, Portable Oxygen tanks  DME Company Name (respiratory supplies): Pt unsure  O2 Rate(s): 2 L  Does patient currently own DME?: Yes  What DME does the patient currently own?: Bedside Commode, Home Oxygen concentrator, Shower Chair, Walker, Portable Oxygen concentrator, Portable Oxygen tanks  Does patient have a history of Outpatient Therapy (PT/OT)?: No  Does the patient have a history of Short-Term Rehab?: Yes  Does patient have a history of HHC?: [facility] ([facility])  Does patient currently have HHC?: No         Patient Information Continued  Income Source: SSI/SSD  Does patient have prescription coverage?: Yes  Does patient receive dialysis treatments?: No  Does patient have a history of substance abuse?: No  Does patient have a history of Mental Health Diagnosis?: No         Means of Transportation  Means of Transport to [facility]:: Family transport      Social Determinants of Health (SDOH)      Flowsheet Row Most Recent Value    Housing Stability    In the last 12 months, was there a time when you were not able to pay the mortgage or rent on time? N   In the past 12 months, how many times have you moved where you were living? 1   At any time in the past 12 months, were you homeless or living in a shelter (including now)? N   Transportation Needs    In the past 12 months, has lack of transportation kept you from medical appointments or from getting medications? no   In the past 12 months, has lack of transportation kept you from meetings, work, or from getting things needed for daily living? No   Food Insecurity    Within the past 12 months, you worried that your food would run out before you got the money to buy more. Never true   Within the past 12 months, the food you bought just didn't last and you didn't have money to get more. Never true   Utilities    In the past 12 months has the electric, gas, oil, or water company threatened to shut off services in your home? No            DISCHARGE DETAILS:  Cm met with pt to review dc needs. Pt indicates she lives with her son and is very independent at home with ADLs. She does have a walker but only uses it when she needs it. Cm to follow therapy eval.     Discharge planning discussed with:: patient  Freedom of Choice: Yes     CM contacted family/caregiver?: No- see comments (declined)  Were Treatment Team discharge recommendations reviewed with patient/caregiver?: Yes  Did patient/caregiver verbalize understanding of patient care needs?: Yes  Were patient/caregiver advised of the risks associated with not following Treatment Team discharge recommendations?: Yes

## 2024-06-17 NOTE — ASSESSMENT & PLAN NOTE
Started on Medrol 40 mg IV every 8 out nebulizer treatment and Pulmicort twice daily.  Zithromax for 3 days

## 2024-06-17 NOTE — ASSESSMENT & PLAN NOTE
Chronic follows with hematology decline workup in the past.  For now we will hold DVT prophylaxis secondary to platelets being 68,000

## 2024-06-17 NOTE — RESPIRATORY THERAPY NOTE
RT Protocol Note  Radha Engel 88 y.o. female MRN: [id number]  Unit/Bed#: -01 Encounter: [id number]    Assessment    Principal Problem:    Acute on chronic combined systolic and diastolic congestive heart failure (HCC)  Active Problems:    Chronic obstructive pulmonary disease with acute exacerbation (HCC)    Chronic respiratory failure with hypoxia (HCC)    Pancytopenia (HCC)    Paroxysmal atrial fibrillation (HCC)    Parkinsons    CREST syndrome (HCC)    CAD (coronary artery disease)      Home Pulmonary Medications:         Past Medical History:   Diagnosis Date    A-fib (HCC)     CHF (congestive heart failure) (HCC)     COPD (chronic obstructive pulmonary disease) (HCC)     Coronary artery disease     Disease of thyroid gland     Hypertension     MI, old     Pancytopenia (HCC)     Renal disorder     Stroke (HCC)      Social History     Socioeconomic History    Marital status:      Spouse name: None    Number of children: None    Years of education: None    Highest education level: None   Occupational History    None   Tobacco Use    Smoking status: Never    Smokeless tobacco: Never   Vaping Use    Vaping status: Never Used   Substance and Sexual Activity    Alcohol use: Not Currently    Drug use: Never    Sexual activity: None   Other Topics Concern    None   Social History Narrative    None     Social Determinants of Health     Financial Resource Strain: Low Risk  (4/12/2024)    Received from [facility]    Overall Financial Resource Strain (CARDIA)     Difficulty of Paying Living Expenses: Not hard at all   Food Insecurity: No Food Insecurity (6/17/2024)    Hunger Vital Sign     Worried About Running Out of Food in the Last Year: Never true     Ran Out of Food in the Last Year: Never true   Transportation Needs: No Transportation Needs (6/17/2024)    PRAPARE - Transportation     Lack of Transportation (Medical): No     Lack of Transportation (Non-Medical): No   Physical  Activity: Not on file   Stress: Not on file   Social Connections: Feeling Socially Integrated (4/24/2024)    Received from [facility]    OASIS : Social Isolation     How often do you feel lonely or isolated from those around you?: Never   Intimate Partner Violence: Not At Risk (4/12/2024)    Received from [facility]    Humiliation, Afraid, Rape, and Kick questionnaire     Fear of Current or Ex-Partner: No     Emotionally Abused: No     Physically Abused: No     Sexually Abused: No   Housing Stability: Low Risk  (6/17/2024)    Housing Stability Vital Sign     Unable to Pay for Housing in the Last Year: No     Number of Times Moved in the Last Year: 1     Homeless in the Last Year: No       Subjective         Objective    Physical Exam:   Assessment Type: During-treatment  General Appearance: Alert, Awake  Respiratory Pattern: Normal  Chest Assessment: Chest expansion symmetrical  Bilateral Breath Sounds: Clear  O2 Device: 2L    Vitals:  Blood pressure (!) 172/82, pulse 87, temperature 97.8 °F (36.6 °C), temperature source Temporal, resp. rate 20, height 5' (1.524 m), weight 51 kg (112 lb 7 oz), SpO2 97%.          Imaging and other studies: I have personally reviewed pertinent reports.      O2 Device: 2L     Plan    Respiratory Plan: No distress/Pulmonary history        Resp Comments: (P) Pt w/ HO COPD and CHF admitted w/ increased SOB over the l;ast few days . Pt however denies any SOB currently.  BS are clear bilaterally . 2L nHS at home and has started using during the day for SOB. . States she uses nebulizers once daily after breakfast . Quit smoking 30yrs ago

## 2024-06-17 NOTE — ASSESSMENT & PLAN NOTE
She is not on anticoagulation offered Watchman declined she is only on aspirin and currently metoprolol

## 2024-06-18 LAB
ANION GAP SERPL CALCULATED.3IONS-SCNC: 9 MMOL/L (ref 4–13)
BASOPHILS # BLD AUTO: 0 THOUSANDS/ÂΜL (ref 0–0.1)
BASOPHILS NFR BLD AUTO: 0 % (ref 0–1)
BUN SERPL-MCNC: 38 MG/DL (ref 5–25)
CALCIUM SERPL-MCNC: 8.7 MG/DL (ref 8.4–10.2)
CHLORIDE SERPL-SCNC: 102 MMOL/L (ref 96–108)
CHOLEST SERPL-MCNC: 139 MG/DL
CO2 SERPL-SCNC: 26 MMOL/L (ref 21–32)
CREAT SERPL-MCNC: 1.53 MG/DL (ref 0.6–1.3)
EOSINOPHIL # BLD AUTO: 0 THOUSAND/ÂΜL (ref 0–0.61)
EOSINOPHIL NFR BLD AUTO: 0 % (ref 0–6)
ERYTHROCYTE [DISTWIDTH] IN BLOOD BY AUTOMATED COUNT: 16.3 % (ref 11.6–15.1)
FERRITIN SERPL-MCNC: 58 NG/ML (ref 11–307)
GFR SERPL CREATININE-BSD FRML MDRD: 30 ML/MIN/1.73SQ M
GLUCOSE SERPL-MCNC: 134 MG/DL (ref 65–140)
HCT VFR BLD AUTO: 28.9 % (ref 34.8–46.1)
HDLC SERPL-MCNC: 48 MG/DL
HGB BLD-MCNC: 9.1 G/DL (ref 11.5–15.4)
IMM GRANULOCYTES # BLD AUTO: 0.01 THOUSAND/UL (ref 0–0.2)
IMM GRANULOCYTES NFR BLD AUTO: 1 % (ref 0–2)
LDLC SERPL CALC-MCNC: 82 MG/DL (ref 0–100)
LYMPHOCYTES # BLD AUTO: 0.17 THOUSANDS/ÂΜL (ref 0.6–4.47)
LYMPHOCYTES NFR BLD AUTO: 15 % (ref 14–44)
MAGNESIUM SERPL-MCNC: 1.9 MG/DL (ref 1.9–2.7)
MCH RBC QN AUTO: 32.3 PG (ref 26.8–34.3)
MCHC RBC AUTO-ENTMCNC: 31.5 G/DL (ref 31.4–37.4)
MCV RBC AUTO: 103 FL (ref 82–98)
MONOCYTES # BLD AUTO: 0.02 THOUSAND/ÂΜL (ref 0.17–1.22)
MONOCYTES NFR BLD AUTO: 2 % (ref 4–12)
NEUTROPHILS # BLD AUTO: 0.91 THOUSANDS/ÂΜL (ref 1.85–7.62)
NEUTS SEG NFR BLD AUTO: 82 % (ref 43–75)
NONHDLC SERPL-MCNC: 91 MG/DL
NRBC BLD AUTO-RTO: 0 /100 WBCS
PLATELET # BLD AUTO: 57 THOUSANDS/UL (ref 149–390)
PMV BLD AUTO: 12.2 FL (ref 8.9–12.7)
POTASSIUM SERPL-SCNC: 4.6 MMOL/L (ref 3.5–5.3)
RBC # BLD AUTO: 2.82 MILLION/UL (ref 3.81–5.12)
SODIUM SERPL-SCNC: 137 MMOL/L (ref 135–147)
TRIGL SERPL-MCNC: 44 MG/DL
WBC # BLD AUTO: 1.11 THOUSAND/UL (ref 4.31–10.16)

## 2024-06-18 PROCEDURE — 97167 OT EVAL HIGH COMPLEX 60 MIN: CPT

## 2024-06-18 PROCEDURE — 99233 SBSQ HOSP IP/OBS HIGH 50: CPT | Performed by: HOSPITALIST

## 2024-06-18 PROCEDURE — 80061 LIPID PANEL: CPT | Performed by: FAMILY MEDICINE

## 2024-06-18 PROCEDURE — 82728 ASSAY OF FERRITIN: CPT | Performed by: FAMILY MEDICINE

## 2024-06-18 PROCEDURE — 97163 PT EVAL HIGH COMPLEX 45 MIN: CPT

## 2024-06-18 PROCEDURE — 94640 AIRWAY INHALATION TREATMENT: CPT

## 2024-06-18 PROCEDURE — 94760 N-INVAS EAR/PLS OXIMETRY 1: CPT

## 2024-06-18 PROCEDURE — 80048 BASIC METABOLIC PNL TOTAL CA: CPT | Performed by: FAMILY MEDICINE

## 2024-06-18 PROCEDURE — 85025 COMPLETE CBC W/AUTO DIFF WBC: CPT | Performed by: FAMILY MEDICINE

## 2024-06-18 PROCEDURE — 83735 ASSAY OF MAGNESIUM: CPT | Performed by: FAMILY MEDICINE

## 2024-06-18 RX ORDER — METHYLPREDNISOLONE SODIUM SUCCINATE 40 MG/ML
40 INJECTION, POWDER, LYOPHILIZED, FOR SOLUTION INTRAMUSCULAR; INTRAVENOUS EVERY 12 HOURS SCHEDULED
Status: DISCONTINUED | OUTPATIENT
Start: 2024-06-18 | End: 2024-06-20

## 2024-06-18 RX ADMIN — QUETIAPINE FUMARATE 50 MG: 25 TABLET ORAL at 21:24

## 2024-06-18 RX ADMIN — RIVASTIGMINE TARTRATE 1.5 MG: 1.5 CAPSULE ORAL at 08:09

## 2024-06-18 RX ADMIN — METHYLPREDNISOLONE SODIUM SUCCINATE 40 MG: 40 INJECTION, POWDER, FOR SOLUTION INTRAMUSCULAR; INTRAVENOUS at 05:03

## 2024-06-18 RX ADMIN — IPRATROPIUM BROMIDE 0.5 MG: 0.5 SOLUTION RESPIRATORY (INHALATION) at 20:08

## 2024-06-18 RX ADMIN — BUDESONIDE 0.5 MG: 0.5 INHALANT ORAL at 20:08

## 2024-06-18 RX ADMIN — LEVALBUTEROL HYDROCHLORIDE 1.25 MG: 1.25 SOLUTION RESPIRATORY (INHALATION) at 20:08

## 2024-06-18 RX ADMIN — METOPROLOL SUCCINATE 50 MG: 50 TABLET, EXTENDED RELEASE ORAL at 08:09

## 2024-06-18 RX ADMIN — CARBIDOPA AND LEVODOPA 1 TABLET: 25; 100 TABLET ORAL at 08:09

## 2024-06-18 RX ADMIN — METHYLPREDNISOLONE SODIUM SUCCINATE 40 MG: 40 INJECTION, POWDER, FOR SOLUTION INTRAMUSCULAR; INTRAVENOUS at 21:23

## 2024-06-18 RX ADMIN — LEVOTHYROXINE SODIUM 88 MCG: 88 TABLET ORAL at 05:04

## 2024-06-18 RX ADMIN — CARBIDOPA AND LEVODOPA 1 TABLET: 25; 100 TABLET ORAL at 21:24

## 2024-06-18 RX ADMIN — HYDROXYCHLOROQUINE SULFATE 200 MG: 200 TABLET, FILM COATED ORAL at 08:13

## 2024-06-18 RX ADMIN — GABAPENTIN 300 MG: 300 CAPSULE ORAL at 08:09

## 2024-06-18 RX ADMIN — AZITHROMYCIN 500 MG: 250 TABLET, FILM COATED ORAL at 11:53

## 2024-06-18 RX ADMIN — ASPIRIN 81 MG: 81 TABLET, COATED ORAL at 08:09

## 2024-06-18 RX ADMIN — CARBIDOPA AND LEVODOPA 1 TABLET: 25; 100 TABLET ORAL at 16:34

## 2024-06-18 RX ADMIN — ATORVASTATIN CALCIUM 40 MG: 40 TABLET, FILM COATED ORAL at 08:09

## 2024-06-18 RX ADMIN — PANTOPRAZOLE SODIUM 40 MG: 40 TABLET, DELAYED RELEASE ORAL at 05:04

## 2024-06-18 RX ADMIN — FUROSEMIDE 40 MG: 10 INJECTION, SOLUTION INTRAMUSCULAR; INTRAVENOUS at 08:10

## 2024-06-18 RX ADMIN — LEVALBUTEROL HYDROCHLORIDE 1.25 MG: 1.25 SOLUTION RESPIRATORY (INHALATION) at 07:28

## 2024-06-18 RX ADMIN — GUAIFENESIN 600 MG: 600 TABLET ORAL at 08:09

## 2024-06-18 RX ADMIN — METOPROLOL SUCCINATE 50 MG: 50 TABLET, EXTENDED RELEASE ORAL at 21:24

## 2024-06-18 RX ADMIN — LEVALBUTEROL HYDROCHLORIDE 1.25 MG: 1.25 SOLUTION RESPIRATORY (INHALATION) at 13:33

## 2024-06-18 RX ADMIN — GUAIFENESIN 600 MG: 600 TABLET ORAL at 21:24

## 2024-06-18 RX ADMIN — BUDESONIDE 0.5 MG: 0.5 INHALANT ORAL at 07:28

## 2024-06-18 RX ADMIN — RIVASTIGMINE TARTRATE 1.5 MG: 1.5 CAPSULE ORAL at 16:34

## 2024-06-18 RX ADMIN — Medication 6 MG: at 21:24

## 2024-06-18 RX ADMIN — IPRATROPIUM BROMIDE 0.5 MG: 0.5 SOLUTION RESPIRATORY (INHALATION) at 07:28

## 2024-06-18 RX ADMIN — GABAPENTIN 600 MG: 300 CAPSULE ORAL at 21:23

## 2024-06-18 RX ADMIN — GABAPENTIN 300 MG: 300 CAPSULE ORAL at 11:53

## 2024-06-18 RX ADMIN — IPRATROPIUM BROMIDE 0.5 MG: 0.5 SOLUTION RESPIRATORY (INHALATION) at 13:33

## 2024-06-18 RX ADMIN — PANTOPRAZOLE SODIUM 40 MG: 40 TABLET, DELAYED RELEASE ORAL at 16:34

## 2024-06-18 NOTE — PROGRESS NOTES
[facility]  Progress Note  Name: Radha Engel I  MRN: [id number]  Unit/Bed#: -01 I Date of Admission: 6/17/2024   Date of Service: 6/18/2024 I Hospital Day: 1    Assessment & Plan   * Acute on chronic combined systolic and diastolic congestive heart failure (HCC)  Assessment & Plan  Wt Readings from Last 3 Encounters:   06/18/24 49.2 kg (108 lb 6.4 oz)   Shortness of breath elevation of proBNP chest x-ray compliant with CHF 2D echo done in April showed EF of 30 to 35% diastolic dysfunction.  She is only on Lasix as needed  Was started on Lasix 40 mg IV twice daily and had good response -- clinically feels improved, now with CLINT, holding Lasix at this time  1.8 L fluid restriction Daily weights I and O  Monitor BMP            Chronic obstructive pulmonary disease with acute exacerbation (HCC)  Assessment & Plan  Started on Medrol 40 mg IV every 8 --wean to every 12 hours  Nebulizer treatment and Pulmicort twice daily.    Zithromax for 3 days    Pancytopenia (HCC)  Assessment & Plan  Chronic follows with hematology decline workup in the past.    Platelets are trending down  Hold DVT prophylaxis for now  Trend platelets    Chronic respiratory failure with hypoxia (HCC)  Assessment & Plan  Chronically on 2 liters continuously     CAD (coronary artery disease)  Assessment & Plan  S/p stent   TAVR  Medical meds    CREST syndrome (HCC)  Assessment & Plan  plaquenil    Parkinsons  Assessment & Plan  Continue Sinemet and Exelon    Paroxysmal atrial fibrillation (HCC)  Assessment & Plan  She is not on anticoagulation offered Watchman declined she is only on aspirin and currently metoprolol               VTE Pharmacologic Prophylaxis: VTE Score: 4 Moderate Risk (Score 3-4) - Pharmacological DVT Prophylaxis Contraindicated. Sequential Compression Devices Ordered.    Mobility:   Basic Mobility Inpatient Raw Score: 20  JH-HLM Goal: 6: Walk 10 steps or more  JH-HLM Achieved: 6: Walk 10 steps  or more  JH-HLM Goal achieved. Continue to encourage appropriate mobility.    Patient Centered Rounds: I performed bedside rounds with nursing staff today.   Discussions with Specialists or Other Care Team Provider: none    Education and Discussions with Family / Patient: Updated  (daughter) via phone.    Total Time Spent on Date of Encounter in care of patient: 35 mins. This time was spent on one or more of the following: performing physical exam; counseling and coordination of care; obtaining or reviewing history; documenting in the medical record; reviewing/ordering tests, medications or procedures; communicating with other healthcare professionals and discussing with patient's family/caregivers.    Current Length of Stay: 1 day(s)  Current Patient Status: Inpatient   Certification Statement: The patient will continue to require additional inpatient hospital stay due to resp fail, CHF, copd  Discharge Plan: Anticipate discharge tomorrow to home.    Code Status: Level 3 - DNAR and DNI    Subjective:   Is much improved, breathing feels significantly improved since yesterday.    Objective:     Vitals:   Temp (24hrs), Av.3 °F (36.3 °C), Min:97.3 °F (36.3 °C), Max:97.3 °F (36.3 °C)    Temp:  [97.3 °F (36.3 °C)] 97.3 °F (36.3 °C)  HR:  [] 94  Resp:  [17-18] 17  BP: (125-138)/(66-75) 125/70  SpO2:  [94 %-100 %] 97 %  Body mass index is 21.17 kg/m².     Input and Output Summary (last 24 hours):     Intake/Output Summary (Last 24 hours) at 2024 1549  Last data filed at 2024 1500  Gross per 24 hour   Intake 840 ml   Output 1450 ml   Net -610 ml       Physical Exam:   Physical Exam  Vitals and nursing note reviewed.   Constitutional:       General: She is not in acute distress.     Appearance: She is well-developed.   HENT:      Head: Normocephalic and atraumatic.   Eyes:      Conjunctiva/sclera: Conjunctivae normal.   Cardiovascular:      Rate and Rhythm: Normal rate. Rhythm irregular.       Heart sounds: No murmur heard.  Pulmonary:      Effort: Pulmonary effort is normal.      Comments: Reduced breath sounds bilaterally, did appreciate some bibasilar rhonchi on exam  Abdominal:      Palpations: Abdomen is soft.      Tenderness: There is no abdominal tenderness.   Musculoskeletal:         General: No swelling.      Cervical back: Neck supple.   Skin:     General: Skin is warm and dry.   Neurological:      Mental Status: She is alert.   Psychiatric:         Mood and Affect: Mood normal.          Additional Data:     Labs:  Results from last 7 days   Lab Units 06/18/24  0435   WBC Thousand/uL 1.11*   HEMOGLOBIN g/dL 9.1*   HEMATOCRIT % 28.9*   PLATELETS Thousands/uL 57*   SEGS PCT % 82*   LYMPHO PCT % 15   MONO PCT % 2*   EOS PCT % 0     Results from last 7 days   Lab Units 06/18/24  0435 06/17/24  0905   SODIUM mmol/L 137 139   POTASSIUM mmol/L 4.6 4.5   CHLORIDE mmol/L 102 108   CO2 mmol/L 26 23   BUN mg/dL 38* 27*   CREATININE mg/dL 1.53* 1.18   ANION GAP mmol/L 9 8   CALCIUM mg/dL 8.7 8.8   ALBUMIN g/dL  --  3.6   TOTAL BILIRUBIN mg/dL  --  0.64   ALK PHOS U/L  --  68   ALT U/L  --  14   AST U/L  --  32   GLUCOSE RANDOM mg/dL 134 87                 Results from last 7 days   Lab Units 06/17/24  0905   LACTIC ACID mmol/L 1.5       Lines/Drains:  Invasive Devices       Peripheral Intravenous Line  Duration             Peripheral IV 06/17/24 Left;Proximal;Ventral (anterior) Forearm 1 day                          Imaging: No pertinent imaging reviewed.    Recent Cultures (last 7 days):   Results from last 7 days   Lab Units 06/17/24  0912 06/17/24  0905   BLOOD CULTURE  No Growth at 24 hrs. No Growth at 24 hrs.       Last 24 Hours Medication List:   Current Facility-Administered Medications   Medication Dose Route Frequency Provider Last Rate    acetaminophen  650 mg Oral Q4H PRN Sofya Colby MD      aspirin  81 mg Oral Daily Sofya Colby MD      atorvastatin  40 mg Oral Daily Sofya Colby  MD      azithromycin  500 mg Oral Q24H Sofya Colby MD      budesonide  0.5 mg Nebulization Q12H Sofya Colby MD      carbidopa-levodopa  1 tablet Oral TID Sofya Colby MD      gabapentin  300 mg Oral Daily With Breakfast Sofya Colby MD      gabapentin  300 mg Oral After Lunch Sofya Colby MD      gabapentin  600 mg Oral HS Sofya Colby MD      guaiFENesin  600 mg Oral Q12H ALICIA Sofya Colby MD      hydroxychloroquine  200 mg Oral Daily With Breakfast Sofya Colby MD      ipratropium  0.5 mg Nebulization TID Sofya Colby MD      levalbuterol  1.25 mg Nebulization TID Sofya Colby MD      levothyroxine  88 mcg Oral Early Morning Sofya Colby MD      melatonin  6 mg Oral HS PRN Chloé S Archie, CRNP      methylPREDNISolone sodium succinate  40 mg Intravenous Q12H ALICIA Jatinder Dale DO      metoprolol succinate  50 mg Oral Q12H ALICIA Sofya Colby MD      pantoprazole  40 mg Oral BID AC Sofya Colby MD      QUEtiapine  50 mg Oral HS Sofya Colby MD      rivastigmine  1.5 mg Oral BID Sofya Colby MD          Today, Patient Was Seen By: Jatinder Dale DO    **Please Note: This note may have been constructed using a voice recognition system.**

## 2024-06-18 NOTE — ASSESSMENT & PLAN NOTE
Wt Readings from Last 3 Encounters:   06/18/24 49.2 kg (108 lb 6.4 oz)   Shortness of breath elevation of proBNP chest x-ray compliant with CHF 2D echo done in April showed EF of 30 to 35% diastolic dysfunction.  She is only on Lasix as needed  Was started on Lasix 40 mg IV twice daily and had good response -- clinically feels improved, now with CLINT, holding Lasix at this time  1.8 L fluid restriction Daily weights I and O  Monitor BMP

## 2024-06-18 NOTE — OCCUPATIONAL THERAPY NOTE
"    Occupational Therapy Evaluation     Patient Name: Radha Engel  Today's Date: 6/18/2024  Problem List  Principal Problem:    Acute on chronic combined systolic and diastolic congestive heart failure (HCC)  Active Problems:    Chronic obstructive pulmonary disease with acute exacerbation (HCC)    Chronic respiratory failure with hypoxia (HCC)    Pancytopenia (HCC)    Paroxysmal atrial fibrillation (HCC)    Parkinsons    CREST syndrome (HCC)    CAD (coronary artery disease)    Past Medical History  Past Medical History:   Diagnosis Date    A-fib (HCC)     CHF (congestive heart failure) (HCC)     COPD (chronic obstructive pulmonary disease) (HCC)     Coronary artery disease     Disease of thyroid gland     Hypertension     MI, old     Pancytopenia (HCC)     Renal disorder     Stroke (HCC)      Past Surgical History  Past Surgical History:   Procedure Laterality Date    CARDIAC TAVR          06/18/24 0959   Note Type   Note type Evaluation   Pain Assessment   Pain Assessment Tool 0-10   Pain Score No Pain   Restrictions/Precautions   Other Precautions O2;Chair Alarm;Bed Alarm;Fall Risk  (2 L O2 NC at start of session)   Home Living   Type of Home House  (1 GISELL)   Home Layout Two level;Able to live on main level with bedroom/bathroom  (5-6 steps ascending to bedroom R HR vs 5-6 steps descending R HR. Has a bathroom on both levels)   Bathroom Shower/Tub Walk-in shower   Bathroom Toilet Raised   Bathroom Equipment Shower chair;Grab bars in shower   Home Equipment Walker;Cane;Wheelchair-manual   Additional Comments Pt reports living in a bilevel home with 1 GISELL and uses RW \"most of the time\" vs furniture surfing for functional mobility.   Prior Function   Level of [place] Independent with ADLs;Independent with functional mobility;Needs assistance with IADLS   Lives With Daughter   Receives Help From Family   IADLs Family/Friend/Other provides transportation;Family/Friend/Other provides " meals;Family/Friend/Other provides medication management   Falls in the last 6 months 0   Comments PTA, pt reports independence with ADLs and functional mobility, has assistance for IADLs.   General   Additional Pertinent History Pt reports that she wears 2 L O2 during day PRN and all the time at night.   ADL   UB Dressing Assistance 5  Supervision/Setup   LB Dressing Assistance 5  Supervision/Setup   LB Dressing Deficit Don/doff R sock;Don/doff L sock   Additional Comments Pt able to doff/don the L sock while seated in chair. Overall, UB/LB ADLs with setup and increased time.   Bed Mobility   Additional Comments Not assessed, pt received sitting OOB in recliner chair upon start of session.   Transfers   Sit to Stand 5  Supervision   Additional items Increased time required;Verbal cues   Stand to Sit 5  Supervision   Additional items Increased time required;Verbal cues   Stand pivot 5  Supervision   Additional items Increased time required;Verbal cues   Additional Comments All functional transfers with RW.   Functional Mobility   Functional Mobility 5  Supervision   Additional Comments Pt participated in functional household distance in room and in hallway with supervision and RW. Pt titrated down to RA as she typically is only on O2 PRN during the day- spO2 dropping to 87% after activity, but increases to 93% with rest.   Balance   Static Sitting Good   Dynamic Sitting Good   Static Standing Fair +   Dynamic Standing Fair   Activity Tolerance   Activity Tolerance Patient limited by fatigue   Medical Staff Made Aware Dodie IRIZARRY   Nurse Made Aware RN, Pita LUCAS Assessment   RUE Assessment WFL   LUE Assessment   LUE Assessment WFL   Hand Function   Gross Motor Coordination Functional   Fine Motor Coordination Functional   Hand Function Comments Pt is R hand dominant.   Sensation   Light Touch No apparent deficits   Vision-Basic Assessment   Current Vision Wears glasses only for reading   Cognition   Overall  Cognitive Status WFL   Arousal/Participation Alert;Cooperative   Attention Within functional limits   Orientation Level Oriented X4   Memory Within functional limits   Following Commands Follows one step commands without difficulty   Assessment   Limitation Decreased ADL status;Decreased UE strength;Decreased endurance;Decreased self-care trans;Decreased high-level ADLs   Prognosis Good   Assessment Pt is a 88 y.o. female, admitted [facility]17/2024 d/t experiencing SOB. Dx: Acute on chronic combined systolic and diastolic CHF. Pt with PMHx impacting their performance during ADL tasks, including: CAD, CREST syndrome, Parkinsons, paroxysmal Afib, pancytopenia, chronic respiratory failure with hypoxia, COPD with acute exacerbation, CHF, CAD, old MI, HTN, disease of thyroid gland, stroke, renal disorder, cardiac TAVR. Prior to admission to the hospital Pt was performing ADLs without physical assistance. IADLs with physical assistance. Functional transfers/ambulation without physical assistance. Cognitive status PTA was WFL. OT order placed to assess Pt's ADLs, cognitive status, and performance during functional tasks in order to maximize safety and independence while making most appropriate d/c recommendations. PT/OT co-evaluation completed at this time d/t significant mobility deficits and safety concerns. Pt's clinical presentation is currently unstable/unpredictable given new onset deficits that affect Pt's occupational performance and ability to safely return to OF including decrease activity tolerance, decrease performance during ADL tasks, decrease generalized strength, decrease activity engagement, and decrease performance during functional transfers combined with medical complications of abnormal renal lab values, A-fib, abnormal H&H, abnormal CBC, low SpO2 values, elevated BNP, and need for input for mobility technique/safety. Personal factors affecting Pt at time of initial evaluation include: step(s) to  enter environment, multi-level environment, advanced age, inability to perform IADLs, inability to navigate community distances, limited insight into impairments, and decreased initiation and engagement. Pt will benefit from continued acute skilled OT services to address deficits as defined above and to maximize level independence/participation during ADLs and functional tasks to facilitate return toward PLOF and improved quality of life. Pt anticipated to continue to progress well towards goals of care and has family support. From an occupational therapy standpoint, No Post-Acute Rehabilitation Needs are recommended upon d/c with continued support of family PRN.   Plan   Treatment Interventions ADL retraining;Functional transfer training;UE strengthening/ROM;Endurance training;Patient/family training;Equipment evaluation/education;Compensatory technique education;Continued evaluation;Cardiac education;Activityengagement;Energy conservation   Goal Expiration Date 07/02/24   OT Frequency 1-2x/wk   Discharge Recommendation   Rehab Resource Intensity Level, OT No post-acute rehabilitation needs   AM-PAC Daily Activity Inpatient   Lower Body Dressing 3   Bathing 3   Toileting 3   Upper Body Dressing 3   Grooming 3   Eating 4   Daily Activity Raw Score 19   Daily Activity Standardized Score (Calc for Raw Score >=11) 40.22   AM-PAC Applied Cognition Inpatient   Following a Speech/Presentation 3   Understanding Ordinary Conversation 4   Taking Medications 3   Remembering Where Things Are Placed or Put Away 3   Remembering List of 4-5 Errands 3   Taking Care of Complicated Tasks 2   Applied Cognition Raw Score 18   Applied Cognition Standardized Score 38.07   End of Consult   Patient Position at End of Consult Bedside chair;Bed/Chair alarm activated;All needs within reach     The patient's raw score on the AM-PAC Daily Activity Inpatient Short Form is 19. A raw score of greater than or equal to 19 suggests the patient may  benefit from discharge to home. Please refer to the recommendation of the Occupational Therapist for safe discharge planning.    Pt goals to be met by 7/2/2024:    Pt will demonstrate ability to complete grooming/hygiene tasks @ mod I after set-up.  Pt will demonstrate ability to complete supine<>sit @ mod I in order to increase safety and independence during ADL tasks.  Pt will demonstrate ability to complete UB ADLs including washing/dressing @ mod I in order to increase performance and participation during meaningful tasks  Pt will demonstrate ability to complete LB dressing @ mod I in order to increase safety and independence during meaningful tasks.   Pt will demonstrate ability to viry/doff socks/shoes while sitting EOB/chair @ mod I in order to increase safety and independence during meaningful tasks.   Pt will demonstrate ability to complete toileting tasks including CM and pericare @ mod I in order to increase safety and independence during meaningful tasks.  Pt will demonstrate ability to complete EOB, chair, toilet/commode transfers @ mod I in order to increase performance and participation during functional tasks.  Pt will demonstrate ability to stand for 10 minutes while maintaining F+ balance with use of RW for UB support PRN.  Pt will demonstrate ability to tolerate 20 minute OT session with no vc'ing for deep breathing or use of energy conservation techniques in order to increase activity tolerance during functional tasks.   Pt will demonstrate Good carryover of use of energy conservation/compensatory strategies during ADLs and functional tasks in order to increase safety and reduce risk for falls.   Pt will demonstrate Good attention and participation in continued evaluation of functional ambulation house hold distances in order to assist with safe d/c planning.  Pt will attend to continued cognitive assessments 100% of the time in order to provide most appropriate d/c recommendations.   Pt will  follow 100% simple 2-step commands and be A&O x4 consistently with environmental cues to increase participation in functional activities.   Pt will identify 3 areas of interest/hobbies and 1 intervention on how to incorporate into daily life in order to increase interaction with environment and peers as well as increase participation in meaningful tasks.   Pt will demonstrate 100% carryover of BUE HEP in order to increase BUE MS and increase performance during functional tasks upon d/c home.    Tate Harris MS, OTR/L

## 2024-06-18 NOTE — PHYSICAL THERAPY NOTE
PHYSICAL THERAPY EVALUATION  NAME:  Radha Engel  DATE: 06/18/24    AGE:   88 y.o.  Mrn:   [id number]  ADMIT DX:  Weakness [R53.1]  SOB (shortness of breath) [R06.02]  Pancytopenia (HCC) [D61.818]  Acute on chronic congestive heart failure, unspecified heart failure type (HCC) [I50.9]    Past Medical History:   Diagnosis Date    A-fib (HCC)     CHF (congestive heart failure) (HCC)     COPD (chronic obstructive pulmonary disease) (HCC)     Coronary artery disease     Disease of thyroid gland     Hypertension     MI, old     Pancytopenia (HCC)     Renal disorder     Stroke (HCC)      Length Of Stay: 1  Performed at least 2 patient identifiers during session: Name and Birthday  PHYSICAL THERAPY EVALUATION :        06/18/24 1000   Note Type   Note type Evaluation   Pain Assessment   Pain Assessment Tool 0-10   Pain Score No Pain   Restrictions/Precautions   Other Precautions Chair Alarm;Bed Alarm;O2;Fall Risk  (2 lpm start -> RA at end satting 97%)   Home Living   Type of Home House  (1 GISELL)   Home Layout Two level;Performs ADLs on one level;Able to live on main level with bedroom/bathroom  (bilevel; 5-6 steps to main level with RHR)   Bathroom Shower/Tub Walk-in shower   Bathroom Toilet Raised   Bathroom Equipment Shower chair;Grab bars in shower   Home Equipment Walker;Cane;Wheelchair-manual  (RW at baseline)   Additional Comments Pt reports living in bilevel home with 1 GISELL, 5-6 steps to main level. RW primarily at baseline but admits to furniture surfing   Prior Function   Level of Bath Independent with ADLs;Independent with functional mobility;Needs assistance with IADLS   Lives With Daughter   Receives Help From Family   IADLs Family/Friend/Other provides transportation;Family/Friend/Other provides meals;Family/Friend/Other provides medication management   Falls in the last 6 months 0   Comments Pt reports completing ADLs and mobility with RW at mod I, has assistance for IADLs and transportation    Cognition   Overall Cognitive Status WFL   Orientation Level Oriented X4   Following Commands Follows one step commands without difficulty   RLE Assessment   RLE Assessment WFL  (4/5 strength)   LLE Assessment   LLE Assessment WFL  (4/5 strength)   Light Touch   RLE Light Touch Impaired   RLE Light Touch Comments bottom of feet   LLE Light Touch Impaired   LLE Light Touch Comments bottom of feet   Bed Mobility   Additional Comments Pt seated OOB in recliner at start and end of session; bed mobility not assessed this session   Transfers   Sit to Stand 5  Supervision   Additional items Increased time required;Verbal cues   Stand to Sit 5  Supervision   Additional items Increased time required;Verbal cues   Stand pivot 5  Supervision   Additional items Increased time required;Verbal cues   Additional Comments RW for transfers with supervision and occ VC for hand placement   Ambulation/Elevation   Gait pattern Short stride;Excessively slow   Gait Assistance 5  Supervision   Additional items Verbal cues   Assistive Device Rolling walker   Distance 130' with RW and supervision with VC for RW management, pt limited by fatigue O2 87-88% on RA recovering to 97% after 1 min seated rest   Balance   Static Sitting Good   Dynamic Sitting Fair +   Static Standing Fair +   Dynamic Standing Fair   Ambulatory Fair   Endurance Deficit   Endurance Deficit Yes   Endurance Deficit Description fatigue   Activity Tolerance   Activity Tolerance Patient limited by fatigue   Medical Staff Made Aware Justine NIXON   Nurse Made Aware Pita TOMPKINS   Assessment   Prognosis Good   Problem List Decreased strength;Decreased endurance;Impaired balance;Decreased mobility   Barriers to Discharge None   Barriers to Discharge Comments Pt reports (-) home alone, has support from family; pt reports dtr currently hospitalized however son is staying with her   Goals   Patient Goals Go home   STG Expiration Date 07/02/24   Plan   Treatment/Interventions  Functional transfer training;LE strengthening/ROM;Elevations;Therapeutic exercise;Endurance training;Equipment eval/education;Patient/family training;Bed mobility;Gait training;Compensatory technique education;Spoke to nursing;OT   PT Frequency 2-3x/wk   Discharge Recommendation   Rehab Resource Intensity Level, PT No post-acute rehabilitation needs   AM-PAC Basic Mobility Inpatient   Turning in Flat Bed Without Bedrails 4   Lying on Back to Sitting on Edge of Flat Bed Without Bedrails 4   Moving Bed to Chair 3   Standing Up From Chair Using Arms 3   Walk in Room 3   Climb 3-5 Stairs With Railing 3   Basic Mobility Inpatient Raw Score 20   Basic Mobility Standardized Score 43.99   [facility] Highest Level Of Mobility   -HLM Goal 6: Walk 10 steps or more   -HLM Achieved 7: Walk 25 feet or more   End of Consult   Patient Position at End of Consult Bedside chair;Bed/Chair alarm activated;All needs within reach     The patient's AM-PAC Basic Mobility Inpatient Short Form Raw Score is 20  . A Raw score of greater than 16 suggests the patient may benefit from discharge to home. Please also refer to the recommendation of the Physical Therapist for safe discharge planning.    (Please find full objective findings from PT assessment regarding body systems outlined above).     Assessment: Pt is a 88 y.o. female seen for PT evaluation s/p admission to [facility] on 6/17/2024 with Acute on chronic combined systolic and diastolic congestive heart failure (HCC).  Order placed for PT services.  Upon evaluation: Pt is presenting with impaired functional mobility due to decreased strength, decreased endurance, impaired balance, gait deviations, and fall risk requiring  supervision assistance for transfers and ambulation with RW . Pt's clinical presentation is currently unstable/unpredictable given the functional mobility deficits above coupled with fall risks as indicated by AM-PAC 6-Clicks: 20/24 as well as  impaired balance and polypharmacy and combined with medical complications of abnormal renal lab values, abnormal H&H, abnormal WBCs, abnormal CBC, and need for input for mobility technique/safety.  Pt's PMHx and comorbidities that may affect physical performance and progress include: A fib, CAD, CHF, COPD, CVA, HTN, and Parkinson's Disease. Personal factors affecting pt at time of IE include: step(s) to enter environment, multi-level environment, advanced age, inability to perform IADLs, inability to navigate level surfaces without external assistance, and inability to navigate community distances. Pt will benefit from continued skilled PT services to address deficits as defined above and to maximize level of functional mobility to facilitate return toward PLOF and improved QOL. From PT/mobility standpoint, recommendation at time of d/c would be No Post-Acute Rehabilitation Needs pending progress in order to reduce fall risk and maximize pt's functional independence and consistency with mobility in order to facilitate return to PLOF.  Recommend trial with walker next 1-2 sessions and ther ex next 1-2 sessions.      Goals: Pt will: Perform bed mobility tasks with modified I to reposition in bed and prepare for transfers. Pt will perform transfers with modified I to increase Indep in prior living environment and prepare for ambulation. Pt will ambulate with RW for >/= 250' with  modified I  to improve gait quality and promote proper use of assistive device and to access home environment. Pt will complete >/= 6 steps with with unilateral handrail with Supervision to return to [facility] home. Increase bilateral LE strength 1/2 grade to facilitate independent mobility and Increase all balance 1/2 grade to decrease risk for falls.        Dodie Alfonso, PT,DPT

## 2024-06-18 NOTE — CONSULTS
"Consult received for CHF Ed.     Pt reports good appetite currently and PTA. Reports eating 3 meals per day prepared by her daughter. Breakfast is typically cream of wheat or blueberry muffin, various fruit options. Lunch is typically soup such as homemade chicken noodle or ham/bean or canned tomato soup. Dinner may be ham/cabbage or lasagna or spaghetti. Snacks on caramel popcorn. Does not endorse following low sodium diet though \"never\" uses the salt shaker at the table. Chart review of weight hx: 6/16/23 119lb, 11/7/23 114lb, 5/1/24 109lb, 5/13/24 110lb, 6/17/24 113lb, 6/18/24 108lb. Pt reports weighing herself daily at home \"I thought I was getting chubby from eating too much.\"     Discussed with pt importance of following low sodium diet. Discussed continuing to avoid use of salt shaker at the table and limit use while cooking. Pt reports using salt on tomato sandwiches, plans to use oregano instead. Discussed limiting processed meats like ham and use fresh meats instead. Encouraged homemade soups with reduce sodium/no salt added broth or use of no salt added tomato sauce and low sodium cheeses. Pt agreeable and appreciative of diet ed. Reviewed fluid restriction with pt including foods/beverages included in this. Pt plans to continue with daily weights and report significant changes to MD. Provided with HF Nutrition Therapy, Salt Free Seasoning Tips, Fluid Restriction Nutrition Therapy handouts and RD contact information. Pt to share handouts with daughter who may call author with any questions.   "

## 2024-06-18 NOTE — CASE MANAGEMENT
Case Management Discharge Planning Note    Patient name Radha Engel  Location /-01 MRN [id number]  : 1935 Date 2024       Current Admission Date: 2024  Current Admission Diagnosis:Acute on chronic combined systolic and diastolic congestive heart failure (HCC)   Patient Active Problem List    Diagnosis Date Noted Date Diagnosed    Acute on chronic combined systolic and diastolic congestive heart failure (HCC) 2024     Chronic obstructive pulmonary disease with acute exacerbation (HCC) 2024     Chronic respiratory failure with hypoxia (HCC) 2024     Pancytopenia (HCC) 2024     Paroxysmal atrial fibrillation (HCC) 2024     Parkinsons 2024     CREST syndrome (HCC) 2024     CAD (coronary artery disease) 2024       LOS (days): 1  Geometric Mean LOS (GMLOS) (days): 3.9  Days to GMLOS:2.7     OBJECTIVE:  Risk of Unplanned Readmission Score: 18.12         Current admission status: Inpatient   Preferred Pharmacy:   [id number] [address] [address]  Phone: [phone] Fax: [phone]    Primary Care Provider: No primary care provider on file.    Primary Insurance: MEDICARE  Secondary Insurance: BLUE CROSS    DISCHARGE DETAILS:     Spoke to patient about her dc.  Therapy cleared for dc. CM spoke to patient about HHC for HF management.  Pt declined.  Updated PCP in [facility].  CM will continue to follow.  Potential dc tomorrow.  Plan is home,no needs.

## 2024-06-18 NOTE — SEPSIS NOTE
Sepsis Note   Radha Engel 88 y.o. female MRN: [id number]  Unit/Bed#: -01 Encounter: [id number]       Initial Sepsis Screening       Row Name 06/18/24 1007                Is the patient's history suggestive of a new or worsening infection? No  -DC                  User Key  (r) = Recorded By, (t) = Taken By, (c) = Cosigned By      Initials Name Provider Type    OCTAVIA Proctor Counts, DO Physician                        Body mass index is 21.17 kg/m².  Wt Readings from Last 1 Encounters:   06/18/24 49.2 kg (108 lb 6.4 oz)     IBW (Ideal Body Weight): 45.5 kg    Ideal body weight: 45.5 kg (100 lb 4.9 oz)  Adjusted ideal body weight: 47 kg (103 lb 8.7 oz)

## 2024-06-18 NOTE — PLAN OF CARE
Problem: PHYSICAL THERAPY ADULT  Goal: Performs mobility at highest level of function for planned discharge setting.  See evaluation for individualized goals.  Description: Treatment/Interventions: Functional transfer training, LE strengthening/ROM, Elevations, Therapeutic exercise, Endurance training, Equipment eval/education, Patient/family training, Bed mobility, Gait training, Compensatory technique education, Spoke to nursing, OT          See flowsheet documentation for full assessment, interventions and recommendations.  Note: Prognosis: Good  Problem List: Decreased strength, Decreased endurance, Impaired balance, Decreased mobility  Assessment: Pt is a 88 y.o. female seen for PT evaluation s/p admission to [facility] on 6/17/2024 with Acute on chronic combined systolic and diastolic congestive heart failure (HCC).  Order placed for PT services.  Upon evaluation: Pt is presenting with impaired functional mobility due to decreased strength, decreased endurance, impaired balance, gait deviations, and fall risk requiring  supervision assistance for transfers and ambulation with RW . Pt's clinical presentation is currently unstable/unpredictable given the functional mobility deficits above coupled with fall risks as indicated by AM-PAC 6-Clicks: 20/24 as well as impaired balance and polypharmacy and combined with medical complications of abnormal renal lab values, abnormal H&H, abnormal WBCs, abnormal CBC, and need for input for mobility technique/safety.  Pt's PMHx and comorbidities that may affect physical performance and progress include: A fib, CAD, CHF, COPD, CVA, HTN, and Parkinson's Disease. Personal factors affecting pt at time of IE include: step(s) to enter environment, multi-level environment, advanced age, inability to perform IADLs, inability to navigate level surfaces without external assistance, and inability to navigate community distances. Pt will benefit from continued  skilled PT services to address deficits as defined above and to maximize level of functional mobility to facilitate return toward PLOF and improved QOL. From PT/mobility standpoint, recommendation at time of d/c would be No Post-Acute Rehabilitation Needs pending progress in order to reduce fall risk and maximize pt's functional independence and consistency with mobility in order to facilitate return to PLOF.  Recommend trial with walker next 1-2 sessions and ther ex next 1-2 sessions.  Barriers to Discharge: None  Barriers to Discharge Comments: Pt reports (-) home alone, has support from family; pt reports dtr currently hospitalized however son is staying with her  Rehab Resource Intensity Level, PT: No post-acute rehabilitation needs    See flowsheet documentation for full assessment.

## 2024-06-18 NOTE — ASSESSMENT & PLAN NOTE
Started on Medrol 40 mg IV every 8 --wean to every 12 hours  Nebulizer treatment and Pulmicort twice daily.    Zithromax for 3 days

## 2024-06-18 NOTE — PLAN OF CARE
Problem: PAIN - ADULT  Goal: Verbalizes/displays adequate comfort level or baseline comfort level  Description: Interventions:  - Encourage patient to monitor pain and request assistance  - Assess pain using appropriate pain scale  - Administer analgesics based on type and severity of pain and evaluate response  - Implement non-pharmacological measures as appropriate and evaluate response  - Consider cultural and social influences on pain and pain management  - Notify physician/advanced practitioner if interventions unsuccessful or patient reports new pain  Outcome: Progressing     Problem: INFECTION - ADULT  Goal: Absence or prevention of progression during hospitalization  Description: INTERVENTIONS:  - Assess and monitor for signs and symptoms of infection  - Monitor lab/diagnostic results  - Monitor all insertion sites, i.e. indwelling lines, tubes, and drains  - Monitor endotracheal if appropriate and nasal secretions for changes in amount and color  - [place] appropriate cooling/warming therapies per order  - Administer medications as ordered  - Instruct and encourage patient and family to use good hand hygiene technique  - Identify and instruct in appropriate isolation precautions for identified infection/condition  Outcome: Progressing  Goal: Absence of fever/infection during neutropenic period  Description: INTERVENTIONS:  - Monitor WBC    Outcome: Progressing     Problem: SAFETY ADULT  Goal: Patient will remain free of falls  Description: INTERVENTIONS:  - Educate patient/family on patient safety including physical limitations  - Instruct patient to call for assistance with activity   - Consult OT/PT to assist with strengthening/mobility   - Keep Call bell within reach  - Keep bed low and locked with side rails adjusted as appropriate  - Keep care items and personal belongings within reach  - Initiate and maintain comfort rounds  - Make Fall Risk Sign visible to staff  - Offer Toileting every 2 Hours,  in advance of need  - Initiate/Maintain bed alarm  - Obtain necessary fall risk management equipment walker   - Apply yellow socks and bracelet for high fall risk patients  - Consider moving patient to room near nurses station  Outcome: Progressing  Goal: Maintain or return to baseline ADL function  Description: INTERVENTIONS:  -  Assess patient's ability to carry out ADLs; assess patient's baseline for ADL function and identify physical deficits which impact ability to perform ADLs (bathing, care of mouth/teeth, toileting, grooming, dressing, etc.)  - Assess/evaluate cause of self-care deficits   - Assess range of motion  - Assess patient's mobility; develop plan if impaired  - Assess patient's need for assistive devices and provide as appropriate  - Encourage maximum independence but intervene and supervise when necessary  - Involve family in performance of ADLs  - Assess for home care needs following discharge   - Consider OT consult to assist with ADL evaluation and planning for discharge  - Provide patient education as appropriate  Outcome: Progressing  Goal: Maintains/Returns to pre admission functional level  Description: INTERVENTIONS:  - Perform AM-PAC 6 Click Basic Mobility/ Daily Activity assessment daily.  - Set and communicate daily mobility goal to care team and patient/family/caregiver.   - Collaborate with rehabilitation services on mobility goals if consulted  - Perform Range of Motion 3 times a day.  - Reposition patient every 2 hours.  - Dangle patient 3 times a day  - Stand patient 3 times a day  - Ambulate patient 3 times a day  - Out of bed to chair 3 times a day   - Out of bed for meals 3 times a day  - Out of bed for toileting  - Record patient progress and toleration of activity level   Outcome: Progressing     Problem: DISCHARGE PLANNING  Goal: Discharge to home or other facility with appropriate resources  Description: INTERVENTIONS:  - Identify barriers to discharge w/patient and  caregiver  - Arrange for needed discharge resources and transportation as appropriate  - Identify discharge learning needs (meds, wound care, etc.)  - Arrange for interpretive services to assist at discharge as needed  - Refer to Case Management Department for coordinating discharge planning if the patient needs post-hospital services based on physician/advanced practitioner order or complex needs related to functional status, cognitive ability, or social support system  Outcome: Progressing     Problem: Knowledge Deficit  Goal: Patient/family/caregiver demonstrates understanding of disease process, treatment plan, medications, and discharge instructions  Description: Complete learning assessment and assess knowledge base.  Interventions:  - Provide teaching at level of understanding  - Provide teaching via preferred learning methods  Outcome: Progressing

## 2024-06-18 NOTE — ASSESSMENT & PLAN NOTE
Chronic follows with hematology decline workup in the past.    Platelets are trending down  Hold DVT prophylaxis for now  Trend platelets

## 2024-06-19 ENCOUNTER — APPOINTMENT (INPATIENT)
Dept: RADIOLOGY | Facility: HOSPITAL | Age: 89
DRG: 291 | End: 2024-06-19
Payer: MEDICARE

## 2024-06-19 LAB
ANION GAP SERPL CALCULATED.3IONS-SCNC: 7 MMOL/L (ref 4–13)
BUN SERPL-MCNC: 50 MG/DL (ref 5–25)
CALCIUM SERPL-MCNC: 8 MG/DL (ref 8.4–10.2)
CHLORIDE SERPL-SCNC: 101 MMOL/L (ref 96–108)
CO2 SERPL-SCNC: 25 MMOL/L (ref 21–32)
CREAT SERPL-MCNC: 1.6 MG/DL (ref 0.6–1.3)
ERYTHROCYTE [DISTWIDTH] IN BLOOD BY AUTOMATED COUNT: 16.4 % (ref 11.6–15.1)
GFR SERPL CREATININE-BSD FRML MDRD: 28 ML/MIN/1.73SQ M
GLUCOSE SERPL-MCNC: 122 MG/DL (ref 65–140)
HCT VFR BLD AUTO: 29.2 % (ref 34.8–46.1)
HGB BLD-MCNC: 8.9 G/DL (ref 11.5–15.4)
MCH RBC QN AUTO: 31.7 PG (ref 26.8–34.3)
MCHC RBC AUTO-ENTMCNC: 30.5 G/DL (ref 31.4–37.4)
MCV RBC AUTO: 104 FL (ref 82–98)
PLATELET # BLD AUTO: 58 THOUSANDS/UL (ref 149–390)
PMV BLD AUTO: 12 FL (ref 8.9–12.7)
POTASSIUM SERPL-SCNC: 4.8 MMOL/L (ref 3.5–5.3)
RBC # BLD AUTO: 2.81 MILLION/UL (ref 3.81–5.12)
SODIUM SERPL-SCNC: 133 MMOL/L (ref 135–147)
WBC # BLD AUTO: 2 THOUSAND/UL (ref 4.31–10.16)

## 2024-06-19 PROCEDURE — 99232 SBSQ HOSP IP/OBS MODERATE 35: CPT | Performed by: FAMILY MEDICINE

## 2024-06-19 PROCEDURE — 99223 1ST HOSP IP/OBS HIGH 75: CPT | Performed by: INTERNAL MEDICINE

## 2024-06-19 PROCEDURE — 94760 N-INVAS EAR/PLS OXIMETRY 1: CPT

## 2024-06-19 PROCEDURE — 80048 BASIC METABOLIC PNL TOTAL CA: CPT | Performed by: HOSPITALIST

## 2024-06-19 PROCEDURE — 94640 AIRWAY INHALATION TREATMENT: CPT

## 2024-06-19 PROCEDURE — 71045 X-RAY EXAM CHEST 1 VIEW: CPT

## 2024-06-19 PROCEDURE — 85027 COMPLETE CBC AUTOMATED: CPT | Performed by: HOSPITALIST

## 2024-06-19 RX ORDER — GABAPENTIN 100 MG/1
200 CAPSULE ORAL
Status: DISCONTINUED | OUTPATIENT
Start: 2024-06-20 | End: 2024-06-21 | Stop reason: HOSPADM

## 2024-06-19 RX ORDER — FUROSEMIDE 10 MG/ML
40 INJECTION INTRAMUSCULAR; INTRAVENOUS ONCE
Status: COMPLETED | OUTPATIENT
Start: 2024-06-19 | End: 2024-06-19

## 2024-06-19 RX ORDER — GABAPENTIN 100 MG/1
200 CAPSULE ORAL
Status: DISCONTINUED | OUTPATIENT
Start: 2024-06-19 | End: 2024-06-21 | Stop reason: HOSPADM

## 2024-06-19 RX ADMIN — GABAPENTIN 200 MG: 100 CAPSULE ORAL at 21:01

## 2024-06-19 RX ADMIN — METOPROLOL SUCCINATE 50 MG: 50 TABLET, EXTENDED RELEASE ORAL at 08:11

## 2024-06-19 RX ADMIN — METHYLPREDNISOLONE SODIUM SUCCINATE 40 MG: 40 INJECTION, POWDER, FOR SOLUTION INTRAMUSCULAR; INTRAVENOUS at 08:11

## 2024-06-19 RX ADMIN — QUETIAPINE FUMARATE 50 MG: 25 TABLET ORAL at 21:02

## 2024-06-19 RX ADMIN — CARBIDOPA AND LEVODOPA 1 TABLET: 25; 100 TABLET ORAL at 08:11

## 2024-06-19 RX ADMIN — BUDESONIDE 0.5 MG: 0.5 INHALANT ORAL at 19:55

## 2024-06-19 RX ADMIN — IPRATROPIUM BROMIDE 0.5 MG: 0.5 SOLUTION RESPIRATORY (INHALATION) at 19:55

## 2024-06-19 RX ADMIN — BUDESONIDE 0.5 MG: 0.5 INHALANT ORAL at 07:18

## 2024-06-19 RX ADMIN — RIVASTIGMINE TARTRATE 1.5 MG: 1.5 CAPSULE ORAL at 08:20

## 2024-06-19 RX ADMIN — CARBIDOPA AND LEVODOPA 1 TABLET: 25; 100 TABLET ORAL at 15:12

## 2024-06-19 RX ADMIN — ATORVASTATIN CALCIUM 40 MG: 40 TABLET, FILM COATED ORAL at 08:11

## 2024-06-19 RX ADMIN — IPRATROPIUM BROMIDE 0.5 MG: 0.5 SOLUTION RESPIRATORY (INHALATION) at 14:24

## 2024-06-19 RX ADMIN — ASPIRIN 81 MG: 81 TABLET, COATED ORAL at 08:11

## 2024-06-19 RX ADMIN — RIVASTIGMINE TARTRATE 1.5 MG: 1.5 CAPSULE ORAL at 17:01

## 2024-06-19 RX ADMIN — AZITHROMYCIN 500 MG: 250 TABLET, FILM COATED ORAL at 11:25

## 2024-06-19 RX ADMIN — METOPROLOL SUCCINATE 50 MG: 50 TABLET, EXTENDED RELEASE ORAL at 21:02

## 2024-06-19 RX ADMIN — LEVOTHYROXINE SODIUM 88 MCG: 88 TABLET ORAL at 05:26

## 2024-06-19 RX ADMIN — CARBIDOPA AND LEVODOPA 1 TABLET: 25; 100 TABLET ORAL at 21:02

## 2024-06-19 RX ADMIN — GABAPENTIN 300 MG: 300 CAPSULE ORAL at 14:01

## 2024-06-19 RX ADMIN — GABAPENTIN 300 MG: 300 CAPSULE ORAL at 08:11

## 2024-06-19 RX ADMIN — FUROSEMIDE 40 MG: 10 INJECTION, SOLUTION INTRAMUSCULAR; INTRAVENOUS at 11:25

## 2024-06-19 RX ADMIN — HYDROXYCHLOROQUINE SULFATE 200 MG: 200 TABLET, FILM COATED ORAL at 08:20

## 2024-06-19 RX ADMIN — LEVALBUTEROL HYDROCHLORIDE 1.25 MG: 1.25 SOLUTION RESPIRATORY (INHALATION) at 07:19

## 2024-06-19 RX ADMIN — PANTOPRAZOLE SODIUM 40 MG: 40 TABLET, DELAYED RELEASE ORAL at 05:26

## 2024-06-19 RX ADMIN — IPRATROPIUM BROMIDE 0.5 MG: 0.5 SOLUTION RESPIRATORY (INHALATION) at 07:18

## 2024-06-19 RX ADMIN — GUAIFENESIN 600 MG: 600 TABLET ORAL at 08:11

## 2024-06-19 RX ADMIN — GUAIFENESIN 600 MG: 600 TABLET ORAL at 21:01

## 2024-06-19 RX ADMIN — LEVALBUTEROL HYDROCHLORIDE 1.25 MG: 1.25 SOLUTION RESPIRATORY (INHALATION) at 14:24

## 2024-06-19 RX ADMIN — METHYLPREDNISOLONE SODIUM SUCCINATE 40 MG: 40 INJECTION, POWDER, FOR SOLUTION INTRAMUSCULAR; INTRAVENOUS at 21:04

## 2024-06-19 RX ADMIN — Medication 6 MG: at 21:02

## 2024-06-19 RX ADMIN — PANTOPRAZOLE SODIUM 40 MG: 40 TABLET, DELAYED RELEASE ORAL at 15:12

## 2024-06-19 RX ADMIN — LEVALBUTEROL HYDROCHLORIDE 1.25 MG: 1.25 SOLUTION RESPIRATORY (INHALATION) at 19:55

## 2024-06-19 NOTE — PLAN OF CARE
Problem: PAIN - ADULT  Goal: Verbalizes/displays adequate comfort level or baseline comfort level  Description: Interventions:  - Encourage patient to monitor pain and request assistance  - Assess pain using appropriate pain scale  - Administer analgesics based on type and severity of pain and evaluate response  - Implement non-pharmacological measures as appropriate and evaluate response  - Consider cultural and social influences on pain and pain management  - Notify physician/advanced practitioner if interventions unsuccessful or patient reports new pain  6/19/2024 1157 by Daisha Lauren RN  Outcome: Progressing  6/19/2024 1157 by Daisha Lauren RN  Outcome: Progressing     Problem: INFECTION - ADULT  Goal: Absence or prevention of progression during hospitalization  Description: INTERVENTIONS:  - Assess and monitor for signs and symptoms of infection  - Monitor lab/diagnostic results  - Monitor all insertion sites, i.e. indwelling lines, tubes, and drains  - Monitor endotracheal if appropriate and nasal secretions for changes in amount and color  - [place] appropriate cooling/warming therapies per order  - Administer medications as ordered  - Instruct and encourage patient and family to use good hand hygiene technique  - Identify and instruct in appropriate isolation precautions for identified infection/condition  6/19/2024 1157 by Daisha Lauren RN  Outcome: Progressing  6/19/2024 1157 by Daisha Lauren RN  Outcome: Progressing  Goal: Absence of fever/infection during neutropenic period  Description: INTERVENTIONS:  - Monitor WBC    6/19/2024 1157 by Daisha Lauren RN  Outcome: Progressing  6/19/2024 1157 by Daisha Lauren RN  Outcome: Progressing     Problem: SAFETY ADULT  Goal: Patient will remain free of falls  Description: INTERVENTIONS:  - Educate patient/family on patient safety including physical limitations  - Instruct patient to call for assistance with activity   - Consult OT/PT to assist with strengthening/mobility    - Keep Call bell within reach  - Keep bed low and locked with side rails adjusted as appropriate  - Keep care items and personal belongings within reach  - Initiate and maintain comfort rounds  - Make Fall Risk Sign visible to staff  - Offer Toileting every 2 Hours, in advance of need  - Initiate/Maintain bed/ chair alarm  - Apply yellow socks and bracelet for high fall risk patients  - Consider moving patient to room near nurses station  6/19/2024 1157 by Daisha Lauren RN  Outcome: Progressing  6/19/2024 1157 by Daisha Lauren RN  Outcome: Progressing  Goal: Maintain or return to baseline ADL function  Description: INTERVENTIONS:  -  Assess patient's ability to carry out ADLs; assess patient's baseline for ADL function and identify physical deficits which impact ability to perform ADLs (bathing, care of mouth/teeth, toileting, grooming, dressing, etc.)  - Assess/evaluate cause of self-care deficits   - Assess range of motion  - Assess patient's mobility; develop plan if impaired  - Assess patient's need for assistive devices and provide as appropriate  - Encourage maximum independence but intervene and supervise when necessary  - Involve family in performance of ADLs  - Assess for home care needs following discharge   - Consider OT consult to assist with ADL evaluation and planning for discharge  - Provide patient education as appropriate  6/19/2024 1157 by Daisha Lauren RN  Outcome: Progressing  6/19/2024 1157 by Daisha Lauren RN  Outcome: Progressing  Goal: Maintains/Returns to pre admission functional level  Description: INTERVENTIONS:  - Perform AM-PAC 6 Click Basic Mobility/ Daily Activity assessment daily.  - Set and communicate daily mobility goal to care team and patient/family/caregiver.   - Collaborate with rehabilitation services on mobility goals if consulted  - Ambulate patient 3 times a day  - Out of bed to chair 3 times a day   - Out of bed for meals 3 times a day  - Out of bed for toileting  - Record  patient progress and toleration of activity level   6/19/2024 1157 by Daisha Lauren RN  Outcome: Progressing  6/19/2024 1157 by Daisha Lauren RN  Outcome: Progressing     Problem: DISCHARGE PLANNING  Goal: Discharge to home or other facility with appropriate resources  Description: INTERVENTIONS:  - Identify barriers to discharge w/patient and caregiver  - Arrange for needed discharge resources and transportation as appropriate  - Identify discharge learning needs (meds, wound care, etc.)  - Arrange for interpretive services to assist at discharge as needed  - Refer to Case Management Department for coordinating discharge planning if the patient needs post-hospital services based on physician/advanced practitioner order or complex needs related to functional status, cognitive ability, or social support system  6/19/2024 1157 by Daisha Lauren RN  Outcome: Progressing  6/19/2024 1157 by Daisha Lauren RN  Outcome: Progressing     Problem: Knowledge Deficit  Goal: Patient/family/caregiver demonstrates understanding of disease process, treatment plan, medications, and discharge instructions  Description: Complete learning assessment and assess knowledge base.  Interventions:  - Provide teaching at level of understanding  - Provide teaching via preferred learning methods  6/19/2024 1157 by Daisha Lauren RN  Outcome: Progressing  6/19/2024 1157 by Daisha Lauren RN  Outcome: Progressing

## 2024-06-19 NOTE — PROGRESS NOTES
[facility]  Progress Note  Name: Radha Engel I  MRN: [id number]  Unit/Bed#: -01 I Date of Admission: 6/17/2024   Date of Service: 6/19/2024 I Hospital Day: 2    Assessment & Plan   * Acute on chronic combined systolic and diastolic congestive heart failure (HCC)  Assessment & Plan  Wt Readings from Last 3 Encounters:   06/19/24 50.3 kg (111 lb)   Shortness of breath elevation of proBNP chest x-ray compliant with CHF 2D echo done in April showed EF of 30 to 35% diastolic dysfunction.  She is only on Lasix as needed at home   She was started on Lasix initially 40 mg IV twice daily had a good response then her creatinine went up and Lasix was stopped.    1.8 L fluid restriction Daily weights I and O  6/19-patient creatinine did go up I did ask nephrology to see as she still has Rales on her lung exam I did review the chest x-ray and is still evidence of interstitial edema and pleural effusion discussed with nephrology they will give Lasix 40 mg IV x 1 and repeat BMP tomorrow            CAD (coronary artery disease)  Assessment & Plan  S/p stent   TAVR  Medical meds    CREST syndrome (HCC)  Assessment & Plan  plaquenil    Parkinsons  Assessment & Plan  Continue Sinemet and Exelon    Paroxysmal atrial fibrillation (HCC)  Assessment & Plan  She is not on anticoagulation offered Watchman declined she is only on aspirin and currently metoprolol    Pancytopenia (HCC)  Assessment & Plan  Chronic follows with hematology decline workup in the past.    Platelets are trending down  Hold DVT prophylaxis for now  Trend platelets    Chronic respiratory failure with hypoxia (HCC)  Assessment & Plan  Chronically on 2 liters continuously     Chronic obstructive pulmonary disease with acute exacerbation (HCC)  Assessment & Plan  Started on Medrol 40 mg IV every 8 --wean to every 12 hours will continue for today still has some wheezing  Nebulizer treatment and Pulmicort twice daily.    Zithromax  for 3 days                 VTE Pharmacologic Prophylaxis: VTE Score: 4 Moderate Risk (Score 3-4) - Pharmacological DVT Prophylaxis Contraindicated. Sequential Compression Devices Ordered.    Mobility:   Basic Mobility Inpatient Raw Score: 20  JH-HLM Goal: 6: Walk 10 steps or more  JH-HLM Achieved: 6: Walk 10 steps or more  JH-HLM Goal achieved. Continue to encourage appropriate mobility.    Patient Centered Rounds: I performed bedside rounds with nursing staff today.   Discussions with Specialists or Other Care Team Provider: nephro    Education and Discussions with Family / Patient: pt will update daughter    Total Time Spent on Date of Encounter in care of patient: >35 mins. This time was spent on one or more of the following: performing physical exam; counseling and coordination of care; obtaining or reviewing history; documenting in the medical record; reviewing/ordering tests, medications or procedures; communicating with other healthcare professionals and discussing with patient's family/caregivers.    Current Length of Stay: 2 day(s)  Current Patient Status: Inpatient   Certification Statement: The patient will continue to require additional inpatient hospital stay due to vivienne  Discharge Plan: Anticipate discharge in 24-48 hrs to home.    Code Status: Level 3 - DNAR and DNI    Subjective:   Seen and examined no sob    Objective:     Vitals:   Temp (24hrs), Av.8 °F (36 °C), Min:96.8 °F (36 °C), Max:96.8 °F (36 °C)    Temp:  [96.8 °F (36 °C)] 96.8 °F (36 °C)  HR:  [] 77  Resp:  [17] 17  BP: (125-132)/(67-73) 132/73  SpO2:  [95 %-99 %] 97 %  Body mass index is 21.68 kg/m².     Input and Output Summary (last 24 hours):     Intake/Output Summary (Last 24 hours) at 2024 1023  Last data filed at 2024 0808  Gross per 24 hour   Intake 420 ml   Output 800 ml   Net -380 ml       Physical Exam:   Physical Exam  Vitals and nursing note reviewed.   Constitutional:       General: She is not in acute  distress.     Appearance: She is well-developed.   HENT:      Head: Normocephalic and atraumatic.   Eyes:      Conjunctiva/sclera: Conjunctivae normal.   Cardiovascular:      Rate and Rhythm: Normal rate and regular rhythm.      Heart sounds: No murmur heard.  Pulmonary:      Effort: Pulmonary effort is normal. No respiratory distress.      Breath sounds: Wheezing and rales (bibasilar) present.   Abdominal:      Palpations: Abdomen is soft.      Tenderness: There is no abdominal tenderness.   Musculoskeletal:         General: No swelling.      Cervical back: Neck supple.   Skin:     General: Skin is warm and dry.      Capillary Refill: Capillary refill takes less than 2 seconds.   Neurological:      Mental Status: She is alert and oriented to person, place, and time.   Psychiatric:         Mood and Affect: Mood normal.          Additional Data:     Labs:  Results from last 7 days   Lab Units 06/19/24 0521 06/18/24  0435   WBC Thousand/uL 2.00* 1.11*   HEMOGLOBIN g/dL 8.9* 9.1*   HEMATOCRIT % 29.2* 28.9*   PLATELETS Thousands/uL 58* 57*   SEGS PCT %  --  82*   LYMPHO PCT %  --  15   MONO PCT %  --  2*   EOS PCT %  --  0     Results from last 7 days   Lab Units 06/19/24  0521 06/18/24  0435 06/17/24  0905   SODIUM mmol/L 133*   < > 139   POTASSIUM mmol/L 4.8   < > 4.5   CHLORIDE mmol/L 101   < > 108   CO2 mmol/L 25   < > 23   BUN mg/dL 50*   < > 27*   CREATININE mg/dL 1.60*   < > 1.18   ANION GAP mmol/L 7   < > 8   CALCIUM mg/dL 8.0*   < > 8.8   ALBUMIN g/dL  --   --  3.6   TOTAL BILIRUBIN mg/dL  --   --  0.64   ALK PHOS U/L  --   --  68   ALT U/L  --   --  14   AST U/L  --   --  32   GLUCOSE RANDOM mg/dL 122   < > 87    < > = values in this interval not displayed.                 Results from last 7 days   Lab Units 06/17/24  0905   LACTIC ACID mmol/L 1.5       Lines/Drains:  Invasive Devices       Peripheral Intravenous Line  Duration             Peripheral IV 06/17/24 Left;Proximal;Ventral (anterior) Forearm 2  days                          Imaging: Reviewed radiology reports from this admission including: chest xray    Recent Cultures (last 7 days):   Results from last 7 days   Lab Units 06/17/24  0912 06/17/24  0905   BLOOD CULTURE  No Growth at 24 hrs. No Growth at 24 hrs.       Last 24 Hours Medication List:   Current Facility-Administered Medications   Medication Dose Route Frequency Provider Last Rate    acetaminophen  650 mg Oral Q4H PRN Sofya Colby MD      aspirin  81 mg Oral Daily Sofya Colby MD      atorvastatin  40 mg Oral Daily Sofya Colby MD      azithromycin  500 mg Oral Q24H Sofya Colby MD      budesonide  0.5 mg Nebulization Q12H Sofya Colby MD      carbidopa-levodopa  1 tablet Oral TID Sofya Colby MD      gabapentin  300 mg Oral Daily With Breakfast Sofya Colby MD      gabapentin  300 mg Oral After Lunch Sofya Colby MD      gabapentin  600 mg Oral HS Sofya Colby MD      guaiFENesin  600 mg Oral Q12H ALICIA Sofya Colby MD      hydroxychloroquine  200 mg Oral Daily With Breakfast Sofya Colby MD      ipratropium  0.5 mg Nebulization TID Sofya Colby MD      levalbuterol  1.25 mg Nebulization TID Sofya Colby MD      levothyroxine  88 mcg Oral Early Morning Sofya Colby MD      melatonin  6 mg Oral HS PRN Chloé S Archie, CRNP      methylPREDNISolone sodium succinate  40 mg Intravenous Q12H ALICIA Jatinder Hitesh Counts, DO      metoprolol succinate  50 mg Oral Q12H ALICIA Sofya Colby MD      pantoprazole  40 mg Oral BID AC Sofya Colby MD      QUEtiapine  50 mg Oral HS Sofya Colyb MD      rivastigmine  1.5 mg Oral BID Sofya Colby MD          Today, Patient Was Seen By: Sofya Colby MD    **Please Note: This note may have been constructed using a voice recognition system.**

## 2024-06-19 NOTE — PLAN OF CARE
Problem: PAIN - ADULT  Goal: Verbalizes/displays adequate comfort level or baseline comfort level  Description: Interventions:  - Encourage patient to monitor pain and request assistance  - Assess pain using appropriate pain scale  - Administer analgesics based on type and severity of pain and evaluate response  - Implement non-pharmacological measures as appropriate and evaluate response  - Consider cultural and social influences on pain and pain management  - Notify physician/advanced practitioner if interventions unsuccessful or patient reports new pain  Outcome: Progressing     Problem: INFECTION - ADULT  Goal: Absence or prevention of progression during hospitalization  Description: INTERVENTIONS:  - Assess and monitor for signs and symptoms of infection  - Monitor lab/diagnostic results  - Monitor all insertion sites, i.e. indwelling lines, tubes, and drains  - Monitor endotracheal if appropriate and nasal secretions for changes in amount and color  - [place] appropriate cooling/warming therapies per order  - Administer medications as ordered  - Instruct and encourage patient and family to use good hand hygiene technique  - Identify and instruct in appropriate isolation precautions for identified infection/condition  Outcome: Progressing  Goal: Absence of fever/infection during neutropenic period  Description: INTERVENTIONS:  - Monitor WBC    Outcome: Progressing     Problem: SAFETY ADULT  Goal: Patient will remain free of falls  Description: INTERVENTIONS:  - Educate patient/family on patient safety including physical limitations  - Instruct patient to call for assistance with activity   - Consult OT/PT to assist with strengthening/mobility   - Keep Call bell within reach  - Keep bed low and locked with side rails adjusted as appropriate  - Keep care items and personal belongings within reach  - Initiate and maintain comfort rounds  - Make Fall Risk Sign visible to staff  - Offer Toileting every 2 Hours,  in advance of need  - Initiate/Maintain bed alarm  - Obtain necessary fall risk management equipment walker   - Apply yellow socks and bracelet for high fall risk patients  - Consider moving patient to room near nurses station  Outcome: Progressing  Goal: Maintain or return to baseline ADL function  Description: INTERVENTIONS:  -  Assess patient's ability to carry out ADLs; assess patient's baseline for ADL function and identify physical deficits which impact ability to perform ADLs (bathing, care of mouth/teeth, toileting, grooming, dressing, etc.)  - Assess/evaluate cause of self-care deficits   - Assess range of motion  - Assess patient's mobility; develop plan if impaired  - Assess patient's need for assistive devices and provide as appropriate  - Encourage maximum independence but intervene and supervise when necessary  - Involve family in performance of ADLs  - Assess for home care needs following discharge   - Consider OT consult to assist with ADL evaluation and planning for discharge  - Provide patient education as appropriate  Outcome: Progressing  Goal: Maintains/Returns to pre admission functional level  Description: INTERVENTIONS:  - Perform AM-PAC 6 Click Basic Mobility/ Daily Activity assessment daily.  - Set and communicate daily mobility goal to care team and patient/family/caregiver.   - Collaborate with rehabilitation services on mobility goals if consulted  - Perform Range of Motion 3 times a day.  - Reposition patient every 2 hours.  - Dangle patient 3 times a day  - Stand patient 3 times a day  - Ambulate patient 3 times a day  - Out of bed to chair 3 times a day   - Out of bed for meals 3 times a day  - Out of bed for toileting  - Record patient progress and toleration of activity level   Outcome: Progressing     Problem: DISCHARGE PLANNING  Goal: Discharge to home or other facility with appropriate resources  Description: INTERVENTIONS:  - Identify barriers to discharge w/patient and  caregiver  - Arrange for needed discharge resources and transportation as appropriate  - Identify discharge learning needs (meds, wound care, etc.)  - Arrange for interpretive services to assist at discharge as needed  - Refer to Case Management Department for coordinating discharge planning if the patient needs post-hospital services based on physician/advanced practitioner order or complex needs related to functional status, cognitive ability, or social support system  Outcome: Progressing     Problem: Knowledge Deficit  Goal: Patient/family/caregiver demonstrates understanding of disease process, treatment plan, medications, and discharge instructions  Description: Complete learning assessment and assess knowledge base.  Interventions:  - Provide teaching at level of understanding  - Provide teaching via preferred learning methods  Outcome: Progressing

## 2024-06-19 NOTE — ASSESSMENT & PLAN NOTE
Started on Medrol 40 mg IV every 8 --wean to every 12 hours will continue for today still has some wheezing  Nebulizer treatment and Pulmicort twice daily.    Zithromax for 3 days

## 2024-06-19 NOTE — PLAN OF CARE
Problem: PAIN - ADULT  Goal: Verbalizes/displays adequate comfort level or baseline comfort level  Description: Interventions:  - Encourage patient to monitor pain and request assistance  - Assess pain using appropriate pain scale  - Administer analgesics based on type and severity of pain and evaluate response  - Implement non-pharmacological measures as appropriate and evaluate response  - Consider cultural and social influences on pain and pain management  - Notify physician/advanced practitioner if interventions unsuccessful or patient reports new pain  Outcome: Progressing     Problem: INFECTION - ADULT  Goal: Absence or prevention of progression during hospitalization  Description: INTERVENTIONS:  - Assess and monitor for signs and symptoms of infection  - Monitor lab/diagnostic results  - Monitor all insertion sites, i.e. indwelling lines, tubes, and drains  - Monitor endotracheal if appropriate and nasal secretions for changes in amount and color  - [place] appropriate cooling/warming therapies per order  - Administer medications as ordered  - Instruct and encourage patient and family to use good hand hygiene technique  - Identify and instruct in appropriate isolation precautions for identified infection/condition  Outcome: Progressing  Goal: Absence of fever/infection during neutropenic period  Description: INTERVENTIONS:  - Monitor WBC    Outcome: Progressing     Problem: SAFETY ADULT  Goal: Patient will remain free of falls  Description: INTERVENTIONS:  - Educate patient/family on patient safety including physical limitations  - Instruct patient to call for assistance with activity   - Consult OT/PT to assist with strengthening/mobility   - Keep Call bell within reach  - Keep bed low and locked with side rails adjusted as appropriate  - Keep care items and personal belongings within reach  - Initiate and maintain comfort rounds  - Make Fall Risk Sign visible to staff  - Offer Toileting every 2 Hours,  in advance of need  - Initiate/Maintain bed alarm  - Obtain necessary fall risk management equipment walker   - Apply yellow socks and bracelet for high fall risk patients  - Consider moving patient to room near nurses station  Outcome: Progressing  Goal: Maintain or return to baseline ADL function  Description: INTERVENTIONS:  -  Assess patient's ability to carry out ADLs; assess patient's baseline for ADL function and identify physical deficits which impact ability to perform ADLs (bathing, care of mouth/teeth, toileting, grooming, dressing, etc.)  - Assess/evaluate cause of self-care deficits   - Assess range of motion  - Assess patient's mobility; develop plan if impaired  - Assess patient's need for assistive devices and provide as appropriate  - Encourage maximum independence but intervene and supervise when necessary  - Involve family in performance of ADLs  - Assess for home care needs following discharge   - Consider OT consult to assist with ADL evaluation and planning for discharge  - Provide patient education as appropriate  Outcome: Progressing  Goal: Maintains/Returns to pre admission functional level  Description: INTERVENTIONS:  - Perform AM-PAC 6 Click Basic Mobility/ Daily Activity assessment daily.  - Set and communicate daily mobility goal to care team and patient/family/caregiver.   - Collaborate with rehabilitation services on mobility goals if consulted  - Perform Range of Motion 3 times a day.  - Reposition patient every 2 hours.  - Dangle patient 3 times a day  - Stand patient 3 times a day  - Ambulate patient 3 times a day  - Out of bed to chair 3 times a day   - Out of bed for meals 3 times a day  - Out of bed for toileting  - Record patient progress and toleration of activity level   Outcome: Progressing     Problem: DISCHARGE PLANNING  Goal: Discharge to home or other facility with appropriate resources  Description: INTERVENTIONS:  - Identify barriers to discharge w/patient and  caregiver  - Arrange for needed discharge resources and transportation as appropriate  - Identify discharge learning needs (meds, wound care, etc.)  - Arrange for interpretive services to assist at discharge as needed  - Refer to Case Management Department for coordinating discharge planning if the patient needs post-hospital services based on physician/advanced practitioner order or complex needs related to functional status, cognitive ability, or social support system  Outcome: Progressing     Problem: Knowledge Deficit  Goal: Patient/family/caregiver demonstrates understanding of disease process, treatment plan, medications, and discharge instructions  Description: Complete learning assessment and assess knowledge base.  Interventions:  - Provide teaching at level of understanding  - Provide teaching via preferred learning methods  Outcome: Progressing

## 2024-06-19 NOTE — CONSULTS
Consultation - Nephrology   Radha Engel 88 y.o. female MRN: [id number]  Unit/Bed#: -01 Encounter: [id number]    ASSESSMENT and PLAN:  Elevated serum creatinine on chronic kidney disease stage IIIb  -Baseline creatinine: 1.2-1.5 mg/dl since 2023.  Previously followed by [facility], was last seen in May 2024.  Was seen by Dr. Perez  -Admission creatinine:  1.18 mg/dl  - Work up:   UA with microscopy: UA bland  Imaging: Will consider renal ultrasound if renal function continues to worsen  -Etiology: Chronic kidney disease likely due to history nephron loss and renal atrophy.  Renal function worsening in the setting of diuresis  - right renal atrophy with prior ultrasound in 2023 showing right kidney measuring 8.2 cm in size with atrophy of the renal cortex.  Also left kidney atrophic measuring 8.1 cm in size.  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  SSA and SSB was positive.  Prior ANCA panel from 2023 was negative  -Hospital Course: Renal function worsened to creatinine 1.6 mg/dL status post diuretics  -Plan:   Patient still with fluid overload with finding of crackles right lung base on chest x-ray suggestive of fluid overload, ordered for IV Lasix 40 mg x 1 dose and will monitor.  Check bladder scan for possibility of urine retention.  Placed on retention protocol.  May have to accept higher creatinine around 1.5 to maintain volume status.  Admission creatinine was slightly lower due to finding of fluid overload.  She will most likely need oral diuretics at the time of discharge.  May also need to decrease the dose of gabapentin if renal function continue to worsen  IF renal function continue to worsen would consider kidney ultrasound  Avoid nephrotoxins and dose all medications per EGFR.    Avoid hypotension.                                            Fluid overload/acute on chronic systolic CHF  -Echocardiogram from April 2024 showed  ejection fraction 30 to 35% with pulmonary artery systolic pressure of 31.4 mmHg  -Chest x-ray was personally reviewed by me in PACS and findings suggestive of interstitial pulmonary edema and trace bilateral pleural effusion  -Status post IV Lasix on admission, still with fluid overload, chest x-ray from today reviewed in PACS and there is some improvement in pulmonary congestion but still some pulmonary congestion on the right side of the lungs, ordered for IV Lasix 40 mg x 1 dose and will monitor    BP/primary hypertension  -Currently BP stable and at goal   -Plan: Continue metoprolol 50 mg twice daily.  On beta-blocker due to A-fib    Hyponatremia: Sodium trended down to 133 meq/L today.  Sodium was normal on admission.  Likely hyponatremia from fluid overload.  Monitor response to diuretics    Shortness of breath due to COPD exacerbation as well as fluid overload, continue treatment for COPD exacerbation continue diuretics as mentioned above.  Patient is currently off oxygen    Anemia, unspecified   -hemoglobin 8.9 g/dL but also with low platelet count and WBC count indicating pancytopenia.  Recommend outpatient workup and follow-up with hematology oncology    Crest syndrome, on hydroxychloroquine      Discussed with primary team regarding renal function slightly worsening but in the setting of fluid overload recommended IV Lasix 40 mg x 1 dose and agreed with the plan      HISTORY OF PRESENT ILLNESS:  Requesting Physician: Sofya Colby MD  Reason for Consult: CLINT.    Radha Engel is a 88 y.o. female with past history of atrial fibrillation, chronic pancytopenia, CHF, CAD status post stents, status post TAVR, crest syndrome, Parkinson's disease, A-fib, COPD who was admitted [facility] after presenting with complaint of shortness of breath and nonproductive cough for 4 days prior to admission.  Was on oral diuretics as needed as needed for lower extremity edema prior to admission.  After  admission was diagnosed with acute on chronic combined systolic and diastolic CHF, recent echocardiogram with ejection fraction 20 to 35% with diastolic dysfunction but started on IV Lasix 40 mg twice daily.  Due to worsening renal function Lasix was held on 6/18.  Also was on IV steroids for COPD exacerbation.  Creatinine today 1.60 mg/dL.  Nephrology consulted for acute kidney injury    -Reviewed records he was admitted at [facility] in April 2024 for presyncopal episode.  Was found to have orthostatic hypotension secondary to autonomic dysfunction.  Was given cardiac monitor to ER and follow-up as outpatient.  Creatinine was around 1.2 to 1.4 mg/dL at that time    PAST MEDICAL HISTORY:  Past Medical History:   Diagnosis Date    A-fib (HCC)     CHF (congestive heart failure) (HCC)     COPD (chronic obstructive pulmonary disease) (HCC)     Coronary artery disease     Disease of thyroid gland     Hypertension     MI, old     Pancytopenia (HCC)     Renal disorder     Stroke (HCC)        PAST SURGICAL HISTORY:  Past Surgical History:   Procedure Laterality Date    CARDIAC TAVR         SOCIAL HISTORY:  Social History     Substance and Sexual Activity   Alcohol Use Not Currently     Social History     Substance and Sexual Activity   Drug Use Never     Social History     Tobacco Use   Smoking Status Never   Smokeless Tobacco Never       FAMILY HISTORY:  History reviewed. No pertinent family history.    ALLERGIES:  No Known Allergies    MEDICATIONS:    Current Facility-Administered Medications:     acetaminophen (TYLENOL) tablet 650 mg, 650 mg, Oral, Q4H PRN, Sofya Colby MD    aspirin (ECOTRIN LOW STRENGTH) EC tablet 81 mg, 81 mg, Oral, Daily, Sofya Colby MD, 81 mg at 06/19/24 0811    atorvastatin (LIPITOR) tablet 40 mg, 40 mg, Oral, Daily, Sofya Colby MD, 40 mg at 06/19/24 0811    azithromycin (ZITHROMAX) tablet 500 mg, 500 mg, Oral, Q24H, Sofya Colby MD, 500 mg at 06/18/24 1153    budesonide (PULMICORT)  inhalation solution 0.5 mg, 0.5 mg, Nebulization, Q12H, Sofya Colby MD, 0.5 mg at 06/19/24 0718    carbidopa-levodopa (SINEMET)  mg per tablet 1 tablet, 1 tablet, Oral, TID, Sofya Colby MD, 1 tablet at 06/19/24 0811    gabapentin (NEURONTIN) capsule 300 mg, 300 mg, Oral, Daily With Breakfast, Sofya Colby MD, 300 mg at 06/19/24 0811    gabapentin (NEURONTIN) capsule 300 mg, 300 mg, Oral, After Lunch, Sofya Colby MD, 300 mg at 06/18/24 1153    gabapentin (NEURONTIN) capsule 600 mg, 600 mg, Oral, HS, Sofya Colby MD, 600 mg at 06/18/24 2123    guaiFENesin (MUCINEX) 12 hr tablet 600 mg, 600 mg, Oral, Q12H ALICIA, Sofya Colby MD, 600 mg at 06/19/24 0811    hydroxychloroquine (PLAQUENIL) tablet 200 mg, 200 mg, Oral, Daily With Breakfast, Sofya Colby MD, 200 mg at 06/19/24 0820    ipratropium (ATROVENT) 0.02 % inhalation solution 0.5 mg, 0.5 mg, Nebulization, TID, Sofya Colby MD, 0.5 mg at 06/19/24 0718    levalbuterol (XOPENEX) inhalation solution 1.25 mg, 1.25 mg, Nebulization, TID, 1.25 mg at 06/19/24 0719 **AND** [DISCONTINUED] sodium chloride 0.9 % inhalation solution 3 mL, 3 mL, Nebulization, TID, Sofya Colby MD    levothyroxine tablet 88 mcg, 88 mcg, Oral, Early Morning, Sofya Colby MD, 88 mcg at 06/19/24 0526    melatonin tablet 6 mg, 6 mg, Oral, HS PRN, Cholé BREWSTER Archie, CRNP, 6 mg at 06/18/24 2124    methylPREDNISolone sodium succinate (Solu-MEDROL) injection 40 mg, 40 mg, Intravenous, Q12H ALICIA, Jatinder Proctor Counts, DO, 40 mg at 06/19/24 0811    metoprolol succinate (TOPROL-XL) 24 hr tablet 50 mg, 50 mg, Oral, Q12H ALICIA, Sofya Colby MD, 50 mg at 06/19/24 0811    pantoprazole (PROTONIX) EC tablet 40 mg, 40 mg, Oral, BID AC, Sofya Colby MD, 40 mg at 06/19/24 0526    QUEtiapine (SEROquel) tablet 50 mg, 50 mg, Oral, HS, Sofya Colby MD, 50 mg at 06/18/24 2124    rivastigmine (EXELON) capsule 1.5 mg, 1.5 mg, Oral, BID, Sofya Colby MD, 1.5 mg at  06/19/24 0820    REVIEW OF SYSTEMS:   Review of Systems   Constitutional:  Negative for chills and fever.   HENT:  Negative for ear pain and sore throat.    Eyes:  Negative for pain and visual disturbance.   Respiratory:  Positive for shortness of breath. Negative for cough.    Cardiovascular:  Negative for chest pain and palpitations.   Gastrointestinal:  Negative for abdominal pain and vomiting.   Genitourinary:  Negative for dysuria and hematuria.   Musculoskeletal:  Negative for arthralgias and back pain.   Skin:  Negative for color change and rash.   Neurological:  Negative for seizures and syncope.   All other systems reviewed and are negative.      All the systems were reviewed and were negative except as documented on the HPI.    PHYSICAL EXAM:  Current Weight: Weight - Scale: 50.3 kg (111 lb)  First Weight: Weight - Scale: 51.6 kg (113 lb 12.1 oz)  Vitals:    06/18/24 2325 06/19/24 0500 06/19/24 0554 06/19/24 0720   BP: 132/73  132/73    Pulse:   77    Resp:       Temp: (!) 96.8 °F (36 °C)  (!) 96.8 °F (36 °C)    TempSrc:       SpO2:    97%   Weight:  50.3 kg (111 lb)     Height:           Intake/Output Summary (Last 24 hours) at 6/19/2024 0949  Last data filed at 6/19/2024 0808  Gross per 24 hour   Intake 420 ml   Output 800 ml   Net -380 ml     Physical Exam  Constitutional:       General: She is not in acute distress.     Appearance: Normal appearance. She is well-developed.   HENT:      Head: Normocephalic and atraumatic.      Nose: Nose normal.      Mouth/Throat:      Mouth: Mucous membranes are moist.   Eyes:      General: No scleral icterus.     Conjunctiva/sclera: Conjunctivae normal.      Pupils: Pupils are equal, round, and reactive to light.   Neck:      Thyroid: No thyromegaly.      Vascular: No JVD.   Cardiovascular:      Rate and Rhythm: Normal rate and regular rhythm.      Heart sounds: Normal heart sounds. No murmur heard.     No friction rub.   Pulmonary:      Effort: Pulmonary effort is  "normal. No respiratory distress.      Breath sounds: Rales (right lung base) present. No wheezing.   Abdominal:      General: Bowel sounds are normal. There is no distension.      Palpations: Abdomen is soft.      Tenderness: There is no abdominal tenderness.   Musculoskeletal:         General: No deformity.      Cervical back: Neck supple.      Right lower leg: No edema.      Left lower leg: No edema.   Skin:     General: Skin is warm and dry.      Findings: No rash.   Neurological:      Mental Status: She is alert and oriented to person, place, and time.   Psychiatric:         Mood and Affect: Mood normal.         Behavior: Behavior normal.         Thought Content: Thought content normal.           Invasive Devices:        Lab Results:   Results from last 7 days   Lab Units 06/19/24  0521 06/18/24  0435 06/17/24  0906 06/17/24  0905   WBC Thousand/uL 2.00* 1.11* 1.88*  --    HEMOGLOBIN g/dL 8.9* 9.1* 9.6*  --    HEMATOCRIT % 29.2* 28.9* 32.3*  --    PLATELETS Thousands/uL 58* 57* 68*  --    POTASSIUM mmol/L 4.8 4.6  --  4.5   CHLORIDE mmol/L 101 102  --  108   CO2 mmol/L 25 26  --  23   BUN mg/dL 50* 38*  --  27*   CREATININE mg/dL 1.60* 1.53*  --  1.18   CALCIUM mg/dL 8.0* 8.7  --  8.8   MAGNESIUM mg/dL  --  1.9  --   --    ALK PHOS U/L  --   --   --  68   ALT U/L  --   --   --  14   AST U/L  --   --   --  32       Other Studies: Chest x-ray from 6/17 with suspected interstitial pulmonary edema and trace bilateral pleural fluid, chest x-ray was personally reviewed by me in PACS      Portions of the record may have been created with voice recognition software. Occasional wrong word or \"sound a like\" substitutions may have occurred due to the inherent limitations of voice recognition software. Read the chart carefully and recognize, using context, where substitutions have occurred.If you have any questions, please contact the dictating provider.   "

## 2024-06-19 NOTE — ASSESSMENT & PLAN NOTE
Wt Readings from Last 3 Encounters:   06/19/24 50.3 kg (111 lb)   Shortness of breath elevation of proBNP chest x-ray compliant with CHF 2D echo done in April showed EF of 30 to 35% diastolic dysfunction.  She is only on Lasix as needed at home   She was started on Lasix initially 40 mg IV twice daily had a good response then her creatinine went up and Lasix was stopped.    1.8 L fluid restriction Daily weights I and O  6/19-patient creatinine did go up I did ask nephrology to see as she still has Rales on her lung exam I did review the chest x-ray and is still evidence of interstitial edema and pleural effusion discussed with nephrology they will give Lasix 40 mg IV x 1 and repeat BMP tomorrow

## 2024-06-20 LAB
ANION GAP SERPL CALCULATED.3IONS-SCNC: 10 MMOL/L (ref 4–13)
BUN SERPL-MCNC: 55 MG/DL (ref 5–25)
CALCIUM SERPL-MCNC: 8.2 MG/DL (ref 8.4–10.2)
CHLORIDE SERPL-SCNC: 100 MMOL/L (ref 96–108)
CO2 SERPL-SCNC: 27 MMOL/L (ref 21–32)
CREAT SERPL-MCNC: 1.61 MG/DL (ref 0.6–1.3)
GFR SERPL CREATININE-BSD FRML MDRD: 28 ML/MIN/1.73SQ M
GLUCOSE SERPL-MCNC: 124 MG/DL (ref 65–140)
POTASSIUM SERPL-SCNC: 4.7 MMOL/L (ref 3.5–5.3)
SODIUM SERPL-SCNC: 137 MMOL/L (ref 135–147)
TSH SERPL DL<=0.05 MIU/L-ACNC: 0.51 UIU/ML (ref 0.45–4.5)

## 2024-06-20 PROCEDURE — 94760 N-INVAS EAR/PLS OXIMETRY 1: CPT

## 2024-06-20 PROCEDURE — 84443 ASSAY THYROID STIM HORMONE: CPT | Performed by: FAMILY MEDICINE

## 2024-06-20 PROCEDURE — 94640 AIRWAY INHALATION TREATMENT: CPT

## 2024-06-20 PROCEDURE — 99233 SBSQ HOSP IP/OBS HIGH 50: CPT | Performed by: HOSPITALIST

## 2024-06-20 PROCEDURE — 99232 SBSQ HOSP IP/OBS MODERATE 35: CPT | Performed by: INTERNAL MEDICINE

## 2024-06-20 PROCEDURE — 80048 BASIC METABOLIC PNL TOTAL CA: CPT | Performed by: FAMILY MEDICINE

## 2024-06-20 RX ORDER — FUROSEMIDE 20 MG/1
20 TABLET ORAL DAILY
Status: DISCONTINUED | OUTPATIENT
Start: 2024-06-20 | End: 2024-06-21 | Stop reason: HOSPADM

## 2024-06-20 RX ORDER — PREDNISONE 20 MG/1
40 TABLET ORAL DAILY
Status: DISCONTINUED | OUTPATIENT
Start: 2024-06-21 | End: 2024-06-21 | Stop reason: HOSPADM

## 2024-06-20 RX ADMIN — PANTOPRAZOLE SODIUM 40 MG: 40 TABLET, DELAYED RELEASE ORAL at 05:25

## 2024-06-20 RX ADMIN — IPRATROPIUM BROMIDE 0.5 MG: 0.5 SOLUTION RESPIRATORY (INHALATION) at 07:23

## 2024-06-20 RX ADMIN — LEVALBUTEROL HYDROCHLORIDE 1.25 MG: 1.25 SOLUTION RESPIRATORY (INHALATION) at 07:25

## 2024-06-20 RX ADMIN — GABAPENTIN 200 MG: 100 CAPSULE ORAL at 07:45

## 2024-06-20 RX ADMIN — GUAIFENESIN 600 MG: 600 TABLET ORAL at 20:35

## 2024-06-20 RX ADMIN — QUETIAPINE FUMARATE 50 MG: 25 TABLET ORAL at 20:35

## 2024-06-20 RX ADMIN — PANTOPRAZOLE SODIUM 40 MG: 40 TABLET, DELAYED RELEASE ORAL at 16:50

## 2024-06-20 RX ADMIN — FUROSEMIDE 20 MG: 20 TABLET ORAL at 10:22

## 2024-06-20 RX ADMIN — IPRATROPIUM BROMIDE 0.5 MG: 0.5 SOLUTION RESPIRATORY (INHALATION) at 13:43

## 2024-06-20 RX ADMIN — GABAPENTIN 300 MG: 300 CAPSULE ORAL at 14:15

## 2024-06-20 RX ADMIN — LEVOTHYROXINE SODIUM 88 MCG: 88 TABLET ORAL at 05:25

## 2024-06-20 RX ADMIN — METHYLPREDNISOLONE SODIUM SUCCINATE 40 MG: 40 INJECTION, POWDER, FOR SOLUTION INTRAMUSCULAR; INTRAVENOUS at 07:51

## 2024-06-20 RX ADMIN — METOPROLOL SUCCINATE 50 MG: 50 TABLET, EXTENDED RELEASE ORAL at 07:52

## 2024-06-20 RX ADMIN — CARBIDOPA AND LEVODOPA 1 TABLET: 25; 100 TABLET ORAL at 20:35

## 2024-06-20 RX ADMIN — LEVALBUTEROL HYDROCHLORIDE 1.25 MG: 1.25 SOLUTION RESPIRATORY (INHALATION) at 19:23

## 2024-06-20 RX ADMIN — RIVASTIGMINE TARTRATE 1.5 MG: 1.5 CAPSULE ORAL at 07:52

## 2024-06-20 RX ADMIN — BUDESONIDE 0.5 MG: 0.5 INHALANT ORAL at 07:23

## 2024-06-20 RX ADMIN — GUAIFENESIN 600 MG: 600 TABLET ORAL at 07:52

## 2024-06-20 RX ADMIN — GABAPENTIN 200 MG: 100 CAPSULE ORAL at 20:35

## 2024-06-20 RX ADMIN — CARBIDOPA AND LEVODOPA 1 TABLET: 25; 100 TABLET ORAL at 16:50

## 2024-06-20 RX ADMIN — ATORVASTATIN CALCIUM 40 MG: 40 TABLET, FILM COATED ORAL at 07:51

## 2024-06-20 RX ADMIN — CARBIDOPA AND LEVODOPA 1 TABLET: 25; 100 TABLET ORAL at 07:52

## 2024-06-20 RX ADMIN — ASPIRIN 81 MG: 81 TABLET, COATED ORAL at 07:51

## 2024-06-20 RX ADMIN — LEVALBUTEROL HYDROCHLORIDE 1.25 MG: 1.25 SOLUTION RESPIRATORY (INHALATION) at 13:43

## 2024-06-20 RX ADMIN — RIVASTIGMINE TARTRATE 1.5 MG: 1.5 CAPSULE ORAL at 17:22

## 2024-06-20 RX ADMIN — METOPROLOL SUCCINATE 50 MG: 50 TABLET, EXTENDED RELEASE ORAL at 20:36

## 2024-06-20 RX ADMIN — BUDESONIDE 0.5 MG: 0.5 INHALANT ORAL at 19:23

## 2024-06-20 RX ADMIN — IPRATROPIUM BROMIDE 0.5 MG: 0.5 SOLUTION RESPIRATORY (INHALATION) at 19:23

## 2024-06-20 RX ADMIN — HYDROXYCHLOROQUINE SULFATE 200 MG: 200 TABLET, FILM COATED ORAL at 07:58

## 2024-06-20 NOTE — CASE MANAGEMENT
Case Management Discharge Planning Note    Patient name Radha Engel  Location /-01 MRN [id number]  : 1935 Date 2024       Current Admission Date: 2024  Current Admission Diagnosis:Acute on chronic combined systolic and diastolic congestive heart failure (HCC)   Patient Active Problem List    Diagnosis Date Noted Date Diagnosed    Acute on chronic combined systolic and diastolic congestive heart failure (HCC) 2024     Chronic obstructive pulmonary disease with acute exacerbation (HCC) 2024     Chronic respiratory failure with hypoxia (HCC) 2024     Pancytopenia (HCC) 2024     Paroxysmal atrial fibrillation (HCC) 2024     Parkinsons 2024     CREST syndrome (HCC) 2024     CAD (coronary artery disease) 2024       LOS (days): 3  Geometric Mean LOS (GMLOS) (days): 3.9  Days to GMLOS:0.9     OBJECTIVE:  Risk of Unplanned Readmission Score: 21.45         Current admission status: Inpatient   Preferred Pharmacy:   [id number] [address] [address]  Phone: [phone] Fax: [phone]    Primary Care Provider: No primary care provider on file.    Primary Insurance: MEDICARE  Secondary Insurance: BLUE CROSS    DISCHARGE DETAILS:    Discharge planning discussed with:: Patient, daughter and son  Freedom of Choice: Yes  Comments - Freedom of Choice: Discussed the value of HHC post dc and all in agreement for HHC. [facility] as pt had them multiple times in the past.  CM contacted family/caregiver?: Yes  Were Treatment Team discharge recommendations reviewed with patient/caregiver?: Yes  Did patient/caregiver verbalize understanding of patient care needs?: Yes  Were patient/caregiver advised of the risks associated with not following Treatment Team discharge recommendations?: Yes    Contacts  Patient Contacts: Chiquis/ Akhil  Relationship to Patient::  Family  Contact Method: In Person  Reason/Outcome: Discharge Planning    Requested Home Health Care         Is the patient interested in HH at discharge?: Yes  Home Health Discipline requested:: Nursing, Physical Therapy  Home Health Agency Name:: [facility]  HHA External Referral Reason (only applicable if external HHA name selected): Patient has established relationship with provider  Home Health Follow-Up Provider:: PCP  Home Health Services Needed:: Heart Failure Management, Strengthening/Theraputic Exercises to Improve Function  Oxygen LPM Ordered (if applicable based on home health services needed):: 2 LPM  Homebound Criteria Met:: Requires the Assistance of Another Person for Safe Ambulation or to Leave the Home  Supporting Clincal Findings:: Fatigues Easliy in Short Distances    DME Referral Provided  Referral made for DME?: No    Other Referral/Resources/Interventions Provided:  Interventions: HHC  Referral Comments: Referral made to [facility], and secured for dc         Treatment Team Recommendation: Home with Home Health Care  Discharge Destination Plan:: Home with Home Health Care  Transport at Discharge : Family      [facility] is set up for patient.            IMM Given (Date):: 06/20/24  IMM Given to:: Family

## 2024-06-20 NOTE — ASSESSMENT & PLAN NOTE
Wt Readings from Last 3 Encounters:   06/20/24 50.6 kg (111 lb 9.6 oz)   Shortness of breath elevation of proBNP chest x-ray compliant with CHF 2D echo done in April showed EF of 30 to 35% diastolic dysfunction.  She is only on Lasix as needed at home   She was started on Lasix initially 40 mg IV twice daily had a good response then her creatinine went up   1.8 L fluid restriction Daily weights I and O  Cr has increased, discussed with Nephrology, will likely have to tolerate higher Cr to continue diuretics  Started on PO Lasix 20mg daily (was on PRN previously)  Trend Cr and monitor 24 hours

## 2024-06-20 NOTE — ASSESSMENT & PLAN NOTE
S/p IV steroids, will wean to PO tomorrow and discharge with taper  Nebulizer treatment and Pulmicort twice daily.    Zithromax for 3 days completed

## 2024-06-20 NOTE — PROGRESS NOTES
NEPHROLOGY PROGRESS NOTE   Radha Engel 88 y.o. female MRN: [id number]  Unit/Bed#: -01 Encounter: [id number]    ASSESSMENT & PLAN:  88-year-old female presented with complaint of shortness of breath and nonproductive cough for 4 days prior to admission.  Found to have CHF and COPD exacerbation and was started on diuretics after which renal function worsened and nephrology was consulted on 6/19    Elevated serum creatinine on chronic kidney disease stage IIIb  -Baseline creatinine: 1.2-1.5 mg/dl since 2023.  Previously followed by [facility], was last seen in May 2024.  Was seen by Dr. Perez  -Admission creatinine:  1.18 mg/dl  - Work up:   UA with microscopy: UA bland  -Etiology: Chronic kidney disease likely due to history nephron loss and renal atrophy.  Renal function worsening in the setting of diuresis  - right renal atrophy with prior ultrasound in 2023 showing right kidney measuring 8.2 cm in size with atrophy of the renal cortex.  Also left kidney atrophic measuring 8.1 cm in size.  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  SSA and SSB was positive.  Prior ANCA panel from 2023 was negative  -Hospital Course: Renal function worsened to creatinine 1.61 mg/dL status post diuretics after admission  -Plan:   Renal function currently stable at creatinine 1.61 mg/dL, electrolytes are stable.  Urine output 1000 mL status post IV Lasix 40 mg given on 6/19.  Lung crackles has resolved.  Clinically feeling better and overall volume status improved.  She will likely need loop diuretics going forward.  Started on oral furosemide 20 mg daily and will continue to monitor renal function and Volume status.  If renal function remains stable for discharge tomorrow from nephrology side  Admission creatinine was slightly lower due to finding of fluid overload.    May also need to decrease the dose of gabapentin if renal function worsens in future or if she  has tremors.  Dose of gabapentin was decreased to 700 mg daily on 6/19  Avoid nephrotoxins and dose all medications per EGFR.    Avoid hypotension.                                             Fluid overload/acute on chronic systolic CHF  -Echocardiogram from April 2024 showed ejection fraction 30 to 35% with pulmonary artery systolic pressure of 31.4 mmHg  -Chest x-ray from admission was personally reviewed by me in PACS and findings suggestive of interstitial pulmonary edema and trace bilateral pleural effusion.  Repeat chest x-ray from 6/19 showed nearly resolved CHF interstitial edema.  -Status post IV Lasix on admission, and again IV Lasix on 6/19 volume status improved with diuretics started on oral Lasix 20 mg daily and will consider discharging at this dose.  She was not on standing dose of diuretics prior to admission.  Continue fluid restriction 1.8 L/day     BP/primary hypertension  -Currently BP stable and is at goal  -Plan: Continue metoprolol 50 mg twice daily.  On beta-blocker due to A-fib and hypertension     Hyponatremia:   Sodium was normal on admission.,  Dropped to 133 meq/L on 6/19.  Likely hyponatremia from fluid overload.  Sodium level is improved to normal range at 127 today status post loop diuretics, continue to monitor recommend fluid restriction.  Fluid restriction 1.8 L/day     Shortness of breath due to COPD exacerbation as well as fluid overload, continue treatment for COPD exacerbation continue diuretics as mentioned above.     Anemia, unspecified   -hemoglobin 8.9 g/dL but also with low platelet count and WBC count indicating pancytopenia.  Recommend outpatient workup and follow-up with hematology oncology     CREST syndrome, on hydroxychloroquine     Discussed with primary team that renal function remained stable and may have to accept higher creatinine to maintain volume status, discussed about starting on oral Lasix 20 mg daily and to monitor for another 24 hours, primary team  agreed with the plan.    SUBJECTIVE:  Patient mentions that shortness of breath is improving, no nausea vomiting    OBJECTIVE:  Current Weight: Weight - Scale: 50.6 kg (111 lb 9.6 oz)  Vitals:    06/20/24 0735   BP: 124/68   Pulse: 72   Resp:    Temp:    SpO2: 100%       Intake/Output Summary (Last 24 hours) at 6/20/2024 0921  Last data filed at 6/20/2024 0814  Gross per 24 hour   Intake 900 ml   Output 1000 ml   Net -100 ml       Physical Exam  General:  Ill looking, awake.  On oxygen 2 L by nasal cannula  Eyes: Conjunctivae pink,  Sclera anicteric  ENT: lips and mucous membranes moist  Neck: supple   Chest: Clear to Auscultation both lungs,  no crackles, ronchus or wheezing.  CVS: S1 & S2 present, normal rate, regular rhythm, no murmur.  Abdomen: soft, non-tender, non-distended, Bowel sounds normoactive  Extremities: no edema of  legs  Skin: no rash  Neuro: awake, alert, oriented x 3   Psych: Mood and affect appropriate      Medications:    Current Facility-Administered Medications:     acetaminophen (TYLENOL) tablet 650 mg, 650 mg, Oral, Q4H PRN, Sofya Colby MD    aspirin (ECOTRIN LOW STRENGTH) EC tablet 81 mg, 81 mg, Oral, Daily, Sofya Colby MD, 81 mg at 06/20/24 0751    atorvastatin (LIPITOR) tablet 40 mg, 40 mg, Oral, Daily, Sofya Colby MD, 40 mg at 06/20/24 0751    budesonide (PULMICORT) inhalation solution 0.5 mg, 0.5 mg, Nebulization, Q12H, Sofya Colby MD, 0.5 mg at 06/20/24 0723    carbidopa-levodopa (SINEMET)  mg per tablet 1 tablet, 1 tablet, Oral, TID, Sofya Colby MD, 1 tablet at 06/20/24 0752    gabapentin (NEURONTIN) capsule 200 mg, 200 mg, Oral, Daily With Breakfast, Sofya Colby MD, 200 mg at 06/20/24 0745    gabapentin (NEURONTIN) capsule 200 mg, 200 mg, Oral, HS, Sofya Colby MD, 200 mg at 06/19/24 2101    gabapentin (NEURONTIN) capsule 300 mg, 300 mg, Oral, After Lunch, Sofya Colby MD, 300 mg at 06/19/24 1401    guaiFENesin (MUCINEX) 12 hr tablet 600  mg, 600 mg, Oral, Q12H ALICIA, Sofya Colby MD, 600 mg at 06/20/24 0752    hydroxychloroquine (PLAQUENIL) tablet 200 mg, 200 mg, Oral, Daily With Breakfast, Sofya Colby MD, 200 mg at 06/20/24 0758    ipratropium (ATROVENT) 0.02 % inhalation solution 0.5 mg, 0.5 mg, Nebulization, TID, Sofya Colby MD, 0.5 mg at 06/20/24 0723    levalbuterol (XOPENEX) inhalation solution 1.25 mg, 1.25 mg, Nebulization, TID, 1.25 mg at 06/20/24 0725 **AND** [DISCONTINUED] sodium chloride 0.9 % inhalation solution 3 mL, 3 mL, Nebulization, TID, Sofya Colby MD    levothyroxine tablet 88 mcg, 88 mcg, Oral, Early Morning, Sofya Colby MD, 88 mcg at 06/20/24 0525    melatonin tablet 6 mg, 6 mg, Oral, HS PRN, Chloé BREWSTER Archie, CRNP, 6 mg at 06/19/24 2102    methylPREDNISolone sodium succinate (Solu-MEDROL) injection 40 mg, 40 mg, Intravenous, Q12H ALICIA, Jatinder Dale, DO, 40 mg at 06/20/24 0751    metoprolol succinate (TOPROL-XL) 24 hr tablet 50 mg, 50 mg, Oral, Q12H ALICIA, Sofya Colby MD, 50 mg at 06/20/24 0752    pantoprazole (PROTONIX) EC tablet 40 mg, 40 mg, Oral, BID AC, Sofya Colby MD, 40 mg at 06/20/24 0525    QUEtiapine (SEROquel) tablet 50 mg, 50 mg, Oral, HS, Sofya Colby MD, 50 mg at 06/19/24 2102    rivastigmine (EXELON) capsule 1.5 mg, 1.5 mg, Oral, BID, Sofya Colby MD, 1.5 mg at 06/20/24 0752    Invasive Devices:        Lab Results:   Results from last 7 days   Lab Units 06/20/24  0459 06/19/24  0521 06/18/24  0435 06/17/24  0906 06/17/24  0905   WBC Thousand/uL  --  2.00* 1.11* 1.88*  --    HEMOGLOBIN g/dL  --  8.9* 9.1* 9.6*  --    HEMATOCRIT %  --  29.2* 28.9* 32.3*  --    PLATELETS Thousands/uL  --  58* 57* 68*  --    POTASSIUM mmol/L 4.7 4.8 4.6  --  4.5   CHLORIDE mmol/L 100 101 102  --  108   CO2 mmol/L 27 25 26  --  23   BUN mg/dL 55* 50* 38*  --  27*   CREATININE mg/dL 1.61* 1.60* 1.53*  --  1.18   CALCIUM mg/dL 8.2* 8.0* 8.7  --  8.8   MAGNESIUM mg/dL  --   --  1.9  --   --    ALK  "PHOS U/L  --   --   --   --  68   ALT U/L  --   --   --   --  14   AST U/L  --   --   --   --  32       Previous work up:         Portions of the record may have been created with voice recognition software. Occasional wrong word or \"sound a like\" substitutions may have occurred due to the inherent limitations of voice recognition software. Read the chart carefully and recognize, using context, where substitutions have occurred.If you have any questions, please contact the dictating provider.    "

## 2024-06-20 NOTE — PLAN OF CARE
Problem: PAIN - ADULT  Goal: Verbalizes/displays adequate comfort level or baseline comfort level  Description: Interventions:  - Encourage patient to monitor pain and request assistance  - Assess pain using appropriate pain scale  - Administer analgesics based on type and severity of pain and evaluate response  - Implement non-pharmacological measures as appropriate and evaluate response  - Consider cultural and social influences on pain and pain management  - Notify physician/advanced practitioner if interventions unsuccessful or patient reports new pain  Outcome: Progressing     Problem: INFECTION - ADULT  Goal: Absence or prevention of progression during hospitalization  Description: INTERVENTIONS:  - Assess and monitor for signs and symptoms of infection  - Monitor lab/diagnostic results  - Monitor all insertion sites, i.e. indwelling lines, tubes, and drains  - Monitor endotracheal if appropriate and nasal secretions for changes in amount and color  - [place] appropriate cooling/warming therapies per order  - Administer medications as ordered  - Instruct and encourage patient and family to use good hand hygiene technique  - Identify and instruct in appropriate isolation precautions for identified infection/condition  Outcome: Progressing  Goal: Absence of fever/infection during neutropenic period  Description: INTERVENTIONS:  - Monitor WBC    Outcome: Progressing     Problem: SAFETY ADULT  Goal: Patient will remain free of falls  Description: INTERVENTIONS:  - Educate patient/family on patient safety including physical limitations  - Instruct patient to call for assistance with activity   - Consult OT/PT to assist with strengthening/mobility   - Keep Call bell within reach  - Keep bed low and locked with side rails adjusted as appropriate  - Keep care items and personal belongings within reach  - Initiate and maintain comfort rounds  - Make Fall Risk Sign visible to staff    - Initiate/Maintain bed  alarm    - Apply yellow socks and bracelet for high fall risk patients  - Consider moving patient to room near nurses station  Outcome: Progressing  Goal: Maintain or return to baseline ADL function  Description: INTERVENTIONS:  -  Assess patient's ability to carry out ADLs; assess patient's baseline for ADL function and identify physical deficits which impact ability to perform ADLs (bathing, care of mouth/teeth, toileting, grooming, dressing, etc.)  - Assess/evaluate cause of self-care deficits   - Assess range of motion  - Assess patient's mobility; develop plan if impaired  - Assess patient's need for assistive devices and provide as appropriate  - Encourage maximum independence but intervene and supervise when necessary  - Involve family in performance of ADLs  - Assess for home care needs following discharge   - Consider OT consult to assist with ADL evaluation and planning for discharge  - Provide patient education as appropriate  Outcome: Progressing  Goal: Maintains/Returns to pre admission functional level  Description: INTERVENTIONS:  - Perform AM-PAC 6 Click Basic Mobility/ Daily Activity assessment daily.  - Set and communicate daily mobility goal to care team and patient/family/caregiver.   - Collaborate with rehabilitation services on mobility goals if consulted    - Out of bed for toileting  - Record patient progress and toleration of activity level   Outcome: Progressing     Problem: DISCHARGE PLANNING  Goal: Discharge to home or other facility with appropriate resources  Description: INTERVENTIONS:  - Identify barriers to discharge w/patient and caregiver  - Arrange for needed discharge resources and transportation as appropriate  - Identify discharge learning needs (meds, wound care, etc.)  - Arrange for interpretive services to assist at discharge as needed  - Refer to Case Management Department for coordinating discharge planning if the patient needs post-hospital services based on  physician/advanced practitioner order or complex needs related to functional status, cognitive ability, or social support system  Outcome: Progressing     Problem: Knowledge Deficit  Goal: Patient/family/caregiver demonstrates understanding of disease process, treatment plan, medications, and discharge instructions  Description: Complete learning assessment and assess knowledge base.  Interventions:  - Provide teaching at level of understanding  - Provide teaching via preferred learning methods  Outcome: Progressing

## 2024-06-20 NOTE — PROGRESS NOTES
Patient:    MRN:  [id number]    Aidin Request ID:  [id number]    Level of care reserved:  Home Health Agency    Partner Reserved:  [facility] [place] [address] [phone]    Clinical needs requested:  physical therapy, skilled nursing    Geography searched:  [phone]    Start of Service:    Request sent:  10:00am EDT on 6/20/2024 by Telma Raymundo    Partner reserved:  10:18am EDT on 6/20/2024 by Telma Raymundo    Choice list shared:  10:09am EDT on 6/20/2024 by Telma Raymundo

## 2024-06-21 VITALS
HEART RATE: 80 BPM | HEIGHT: 60 IN | RESPIRATION RATE: 19 BRPM | DIASTOLIC BLOOD PRESSURE: 83 MMHG | BODY MASS INDEX: 22.03 KG/M2 | WEIGHT: 112.2 LBS | TEMPERATURE: 97.3 F | OXYGEN SATURATION: 99 % | SYSTOLIC BLOOD PRESSURE: 138 MMHG

## 2024-06-21 PROBLEM — N17.9 AKI (ACUTE KIDNEY INJURY) (HCC): Status: ACTIVE | Noted: 2024-06-21

## 2024-06-21 LAB
ANION GAP SERPL CALCULATED.3IONS-SCNC: 6 MMOL/L (ref 4–13)
BUN SERPL-MCNC: 49 MG/DL (ref 5–25)
CALCIUM SERPL-MCNC: 7.4 MG/DL (ref 8.4–10.2)
CHLORIDE SERPL-SCNC: 104 MMOL/L (ref 96–108)
CO2 SERPL-SCNC: 29 MMOL/L (ref 21–32)
CREAT SERPL-MCNC: 1.25 MG/DL (ref 0.6–1.3)
ERYTHROCYTE [DISTWIDTH] IN BLOOD BY AUTOMATED COUNT: 15.7 % (ref 11.6–15.1)
GFR SERPL CREATININE-BSD FRML MDRD: 39 ML/MIN/1.73SQ M
GLUCOSE SERPL-MCNC: 84 MG/DL (ref 65–140)
HCT VFR BLD AUTO: 31.4 % (ref 34.8–46.1)
HGB BLD-MCNC: 9.6 G/DL (ref 11.5–15.4)
MCH RBC QN AUTO: 31.9 PG (ref 26.8–34.3)
MCHC RBC AUTO-ENTMCNC: 30.6 G/DL (ref 31.4–37.4)
MCV RBC AUTO: 104 FL (ref 82–98)
PLATELET # BLD AUTO: 57 THOUSANDS/UL (ref 149–390)
PMV BLD AUTO: 12.2 FL (ref 8.9–12.7)
POTASSIUM SERPL-SCNC: 4.2 MMOL/L (ref 3.5–5.3)
RBC # BLD AUTO: 3.01 MILLION/UL (ref 3.81–5.12)
SODIUM SERPL-SCNC: 139 MMOL/L (ref 135–147)
WBC # BLD AUTO: 2.45 THOUSAND/UL (ref 4.31–10.16)

## 2024-06-21 PROCEDURE — 99232 SBSQ HOSP IP/OBS MODERATE 35: CPT | Performed by: INTERNAL MEDICINE

## 2024-06-21 PROCEDURE — 94640 AIRWAY INHALATION TREATMENT: CPT

## 2024-06-21 PROCEDURE — 80048 BASIC METABOLIC PNL TOTAL CA: CPT | Performed by: INTERNAL MEDICINE

## 2024-06-21 PROCEDURE — 85027 COMPLETE CBC AUTOMATED: CPT | Performed by: HOSPITALIST

## 2024-06-21 PROCEDURE — 94760 N-INVAS EAR/PLS OXIMETRY 1: CPT

## 2024-06-21 PROCEDURE — 99239 HOSP IP/OBS DSCHRG MGMT >30: CPT | Performed by: HOSPITALIST

## 2024-06-21 RX ORDER — PREDNISONE 10 MG/1
40 TABLET ORAL DAILY
Qty: 12 TABLET | Refills: 0 | Status: SHIPPED | OUTPATIENT
Start: 2024-06-21 | End: 2024-06-24

## 2024-06-21 RX ORDER — FUROSEMIDE 20 MG/1
20 TABLET ORAL DAILY
Qty: 30 TABLET | Refills: 0 | Status: SHIPPED | OUTPATIENT
Start: 2024-06-22

## 2024-06-21 RX ADMIN — ATORVASTATIN CALCIUM 40 MG: 40 TABLET, FILM COATED ORAL at 08:00

## 2024-06-21 RX ADMIN — HYDROXYCHLOROQUINE SULFATE 200 MG: 200 TABLET, FILM COATED ORAL at 07:56

## 2024-06-21 RX ADMIN — CARBIDOPA AND LEVODOPA 1 TABLET: 25; 100 TABLET ORAL at 08:00

## 2024-06-21 RX ADMIN — FUROSEMIDE 20 MG: 20 TABLET ORAL at 08:00

## 2024-06-21 RX ADMIN — IPRATROPIUM BROMIDE 0.5 MG: 0.5 SOLUTION RESPIRATORY (INHALATION) at 07:18

## 2024-06-21 RX ADMIN — LEVALBUTEROL HYDROCHLORIDE 1.25 MG: 1.25 SOLUTION RESPIRATORY (INHALATION) at 07:18

## 2024-06-21 RX ADMIN — GABAPENTIN 200 MG: 100 CAPSULE ORAL at 07:52

## 2024-06-21 RX ADMIN — PREDNISONE 40 MG: 20 TABLET ORAL at 08:00

## 2024-06-21 RX ADMIN — RIVASTIGMINE TARTRATE 1.5 MG: 1.5 CAPSULE ORAL at 08:00

## 2024-06-21 RX ADMIN — LEVOTHYROXINE SODIUM 88 MCG: 88 TABLET ORAL at 05:21

## 2024-06-21 RX ADMIN — METOPROLOL SUCCINATE 50 MG: 50 TABLET, EXTENDED RELEASE ORAL at 08:00

## 2024-06-21 RX ADMIN — ASPIRIN 81 MG: 81 TABLET, COATED ORAL at 08:00

## 2024-06-21 RX ADMIN — PANTOPRAZOLE SODIUM 40 MG: 40 TABLET, DELAYED RELEASE ORAL at 05:21

## 2024-06-21 RX ADMIN — BUDESONIDE 0.5 MG: 0.5 INHALANT ORAL at 07:18

## 2024-06-21 RX ADMIN — GUAIFENESIN 600 MG: 600 TABLET ORAL at 08:00

## 2024-06-21 NOTE — ASSESSMENT & PLAN NOTE
S/p IV steroids, will wean to and complete short course of steroids with PO prednisone  Nebulizer treatment and Pulmicort twice daily.    Zithromax for 3 days completed

## 2024-06-21 NOTE — DISCHARGE INSTR - AVS FIRST PAGE
- Start taking Lasix 20 mg daily  -Please monitor your weights, and notify your primary care provider or cardiologist if your weight increases by 3 pounds in a day or 5 pounds in 1 week  -Recommend a sodium restriction with less than 2 g sodium diet  -Please follow-up with your outpatient cardiologist and pulmonologist, as well as nephrologist  -Follow-up with your primary care provider in 1 week

## 2024-06-21 NOTE — PROGRESS NOTES
NEPHROLOGY PROGRESS NOTE   Radha Engel 88 y.o. female MRN: [id number]  Unit/Bed#: -01 Encounter: [id number]    ASSESSMENT & PLAN:  88-year-old female presented with complaint of shortness of breath and nonproductive cough for 4 days prior to admission.  Found to have CHF and COPD exacerbation and was started on diuretics after which renal function worsened and nephrology was consulted on 6/19    Elevated serum creatinine on chronic kidney disease stage IIIb  -Baseline creatinine: 1.2-1.5 mg/dl since 2023.  Previously followed by [facility], was last seen in May 2024.  Was seen by Dr. Perez  -Admission creatinine:  1.18 mg/dl  - Work up:   UA with microscopy: UA bland  -Etiology: Chronic kidney disease likely due to history nephron loss and renal atrophy.  Renal function worsening in the setting of diuresis  - right renal atrophy with prior ultrasound in 2023 showing right kidney measuring 8.2 cm in size with atrophy of the renal cortex.  Also left kidney atrophic measuring 8.1 cm in size.  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  SSA and SSB was positive.  Prior ANCA panel from 2023 was negative  -Hospital Course: Renal function worsened to creatinine 1.61 mg/dL status post diuretics after admission.  Bladder scan was negative  -Plan:   Patient was initially diuresed with IV Lasix and then transition to oral Lasix 20 mg daily on 6/20.  Renal function today improved to creatinine 1.25 mg/dL from creatinine 1.6 yesterday.  Continue current dose of Lasix 20 mg daily.  Weight is currently stable at 112 pounds.  Recommend daily weight monitoring at home and if weight trending up by 3 pounds in a day or 5 pounds in a week may consider extra dose of oral Lasix.   Patient follows with nephrologist at [facility], she should call [facility] office and arrange for follow-up appointment and repeat labs  Above plan was discussed with primary team and recommended  discharge, primary team agreed with the plan  Admission creatinine was slightly lower due to finding of fluid overload.    May also need to decrease the dose of gabapentin if renal function worsens in future or if she has tremors.  Dose of gabapentin was decreased to 700 mg daily on 6/19  Avoid nephrotoxins and dose all medications per EGFR.    Avoid hypotension.                                             Fluid overload/acute on chronic systolic CHF  -Echocardiogram from April 2024 showed ejection fraction 30 to 35% with pulmonary artery systolic pressure of 31.4 mmHg  -Chest x-ray from admission was personally reviewed by me in PACS and findings suggestive of interstitial pulmonary edema and trace bilateral pleural effusion.  Repeat chest x-ray from 6/19 showed nearly resolved CHF interstitial edema.  -Status post IV Lasix on admission, and again IV Lasix on 6/19 volume status improved with diuretics started on oral Lasix 20 mg daily on 6/20 patient appears stable continue Lasix 20 mg daily, maintaining negative balance with current dose of Lasix.   She was not on standing dose of diuretics prior to admission.  Continue fluid restriction 1.8 L/day.  Recommend daily weight monitoring after discharge     BP/primary hypertension  -Currently BP stable and is at goal, continue current treatment  -Plan: Continue metoprolol 50 mg twice daily.  On beta-blocker due to A-fib and hypertension     Hyponatremia:   Sodium was normal on admission.,  Dropped to 133 meq/L on 6/19.  Likely hyponatremia from fluid overload.  Sodium currently at normal range at 135 with use of loop diuretics     Shortness of breath due to COPD exacerbation as well as fluid overload, continue treatment for COPD exacerbation continue diuretics as mentioned above.     Anemia, unspecified   -hemoglobin 9.6 g/dL but also with low platelet count and WBC count indicating pancytopenia.  Recommend outpatient workup and follow-up with hematology oncology      CREST syndrome, on hydroxychloroquine     Discussed with primary team that renal function improved and okay to continue current dose of Lasix at the time of discharge, primary agreed with the plan.  Patient follows with outpatient nephrologist Dr. Perez from [facility]    SUBJECTIVE:  No new complaints no chest pain or shortness of breath   -patient wants to go home    OBJECTIVE:  Current Weight: Weight - Scale: 50.9 kg (112 lb 3.2 oz)  Vitals:    06/21/24 0719   BP: 138/83   Pulse:    Resp: 19   Temp: (!) 97.3 °F (36.3 °C)   SpO2: 97%       Intake/Output Summary (Last 24 hours) at 6/21/2024 0936  Last data filed at 6/21/2024 0809  Gross per 24 hour   Intake 660 ml   Output 1000 ml   Net -340 ml       Physical Exam  General:  Ill looking, awake.     Eyes: Conjunctivae pink,  Sclera anicteric  ENT: lips and mucous membranes moist  Neck: supple   Chest: Bilateral basal coarse crackles  CVS: S1 & S2 present, normal rate, regular rhythm, no murmur.  Abdomen: soft, non-tender, non-distended, Bowel sounds normoactive  Extremities: no edema of  legs  Skin: no rash  Neuro: awake, alert, oriented x 3  Psych: Mood and affect appropriate      Medications:    Current Facility-Administered Medications:     acetaminophen (TYLENOL) tablet 650 mg, 650 mg, Oral, Q4H PRN, Sofya Colby MD    aspirin (ECOTRIN LOW STRENGTH) EC tablet 81 mg, 81 mg, Oral, Daily, Sofya Colby MD, 81 mg at 06/21/24 0800    atorvastatin (LIPITOR) tablet 40 mg, 40 mg, Oral, Daily, Sofya Colby MD, 40 mg at 06/21/24 0800    budesonide (PULMICORT) inhalation solution 0.5 mg, 0.5 mg, Nebulization, Q12H, Sofya Colby MD, 0.5 mg at 06/21/24 0718    carbidopa-levodopa (SINEMET)  mg per tablet 1 tablet, 1 tablet, Oral, TID, Sofya Colby MD, 1 tablet at 06/21/24 0800    furosemide (LASIX) tablet 20 mg, 20 mg, Oral, Daily, Tyree Baird MD, 20 mg at 06/21/24 0800    gabapentin (NEURONTIN) capsule 200 mg, 200 mg, Oral, Daily With Breakfast, Sofya  MD [place], 200 mg at 06/21/24 0752    gabapentin (NEURONTIN) capsule 200 mg, 200 mg, Oral, HS, Sofya Colby MD, 200 mg at 06/20/24 2035    gabapentin (NEURONTIN) capsule 300 mg, 300 mg, Oral, After Lunch, Sofya Colby MD, 300 mg at 06/20/24 1415    guaiFENesin (MUCINEX) 12 hr tablet 600 mg, 600 mg, Oral, Q12H ALICIA, Sofya Colby MD, 600 mg at 06/21/24 0800    hydroxychloroquine (PLAQUENIL) tablet 200 mg, 200 mg, Oral, Daily With Breakfast, Sofya Colby MD, 200 mg at 06/21/24 0756    ipratropium (ATROVENT) 0.02 % inhalation solution 0.5 mg, 0.5 mg, Nebulization, TID, Sofya Colby MD, 0.5 mg at 06/21/24 0718    levalbuterol (XOPENEX) inhalation solution 1.25 mg, 1.25 mg, Nebulization, TID, 1.25 mg at 06/21/24 0718 **AND** [DISCONTINUED] sodium chloride 0.9 % inhalation solution 3 mL, 3 mL, Nebulization, TID, Sofya Colby MD    levothyroxine tablet 88 mcg, 88 mcg, Oral, Early Morning, Sofya Colby MD, 88 mcg at 06/21/24 0521    melatonin tablet 6 mg, 6 mg, Oral, HS PRN, Chloé S Archie, CRNP, 6 mg at 06/19/24 2102    metoprolol succinate (TOPROL-XL) 24 hr tablet 50 mg, 50 mg, Oral, Q12H ALICIA, Sofya Colby MD, 50 mg at 06/21/24 0800    pantoprazole (PROTONIX) EC tablet 40 mg, 40 mg, Oral, BID AC, Sofya Colby MD, 40 mg at 06/21/24 0521    predniSONE tablet 40 mg, 40 mg, Oral, Daily, Jatinder Dale, DO, 40 mg at 06/21/24 0800    QUEtiapine (SEROquel) tablet 50 mg, 50 mg, Oral, HS, Sofya Colby MD, 50 mg at 06/20/24 2035    rivastigmine (EXELON) capsule 1.5 mg, 1.5 mg, Oral, BID, Sofya Colby MD, 1.5 mg at 06/21/24 0800    Invasive Devices:        Lab Results:   Results from last 7 days   Lab Units 06/21/24  0523 06/20/24  0459 06/19/24  0521 06/18/24  0435 06/17/24  0906 06/17/24  0905   WBC Thousand/uL 2.45*  --  2.00* 1.11*   < >  --    HEMOGLOBIN g/dL 9.6*  --  8.9* 9.1*   < >  --    HEMATOCRIT % 31.4*  --  29.2* 28.9*   < >  --    PLATELETS Thousands/uL 57*  --  58* 57*  "  < >  --    POTASSIUM mmol/L 4.2 4.7 4.8 4.6  --  4.5   CHLORIDE mmol/L 104 100 101 102  --  108   CO2 mmol/L 29 27 25 26  --  23   BUN mg/dL 49* 55* 50* 38*  --  27*   CREATININE mg/dL 1.25 1.61* 1.60* 1.53*  --  1.18   CALCIUM mg/dL 7.4* 8.2* 8.0* 8.7  --  8.8   MAGNESIUM mg/dL  --   --   --  1.9  --   --    ALK PHOS U/L  --   --   --   --   --  68   ALT U/L  --   --   --   --   --  14   AST U/L  --   --   --   --   --  32    < > = values in this interval not displayed.       Previous work up:         Portions of the record may have been created with voice recognition software. Occasional wrong word or \"sound a like\" substitutions may have occurred due to the inherent limitations of voice recognition software. Read the chart carefully and recognize, using context, where substitutions have occurred.If you have any questions, please contact the dictating provider.    "

## 2024-06-21 NOTE — ASSESSMENT & PLAN NOTE
Wt Readings from Last 3 Encounters:   06/21/24 50.9 kg (112 lb 3.2 oz)   Shortness of breath elevation of proBNP chest x-ray compliant with CHF 2D echo done in April showed EF of 30 to 35% diastolic dysfunction.  She is only on Lasix as needed at home   She was started on Lasix initially 40 mg IV twice daily had a good response then her creatinine went up   1.8 L fluid restriction Daily weights I and O  Appreciate nephrology as Cr had worsening, now trending down  Follow-up BMP in 1 week with primary care provider  Recommend 20 mg Lasix daily on discharge

## 2024-06-21 NOTE — ASSESSMENT & PLAN NOTE
Due to diuretics  Creatinine now trending down  BMP in 1 week with primary care provider  Follow-up with outpatient nephrologist

## 2024-06-21 NOTE — CASE MANAGEMENT
Case Management Discharge Planning Note    Patient name Radha Engel  Location /-01 MRN [id number]  : 1935 Date 2024       Current Admission Date: 2024  Current Admission Diagnosis:Acute on chronic combined systolic and diastolic congestive heart failure (HCC)   Patient Active Problem List    Diagnosis Date Noted Date Diagnosed    CLINT (acute kidney injury) (HCC) 2024     Acute on chronic combined systolic and diastolic congestive heart failure (HCC) 2024     Chronic obstructive pulmonary disease with acute exacerbation (HCC) 2024     Chronic respiratory failure with hypoxia (HCC) 2024     Pancytopenia (HCC) 2024     Paroxysmal atrial fibrillation (HCC) 2024     Parkinsons 2024     CREST syndrome ([facility]) 2024     CAD (coronary artery disease) 2024       LOS (days): 4  Geometric Mean LOS (GMLOS) (days): 3.9  Days to GMLOS:-0.1     OBJECTIVE:  Risk of Unplanned Readmission Score: 20.25         Current admission status: Inpatient   Preferred Pharmacy:   [id number] [address] [address]  Phone: [phone] Fax: [phone]    Primary Care Provider: No primary care provider on file.    Primary Insurance: MEDICARE  Secondary Insurance: BLUE CROSS    DISCHARGE DETAILS:     Notified [facility] of dc today.  AVS was forward to them.

## 2024-06-21 NOTE — DISCHARGE SUMMARY
[facility]  Discharge- Radha Engel 1935, 88 y.o. female MRN: [id number]  Unit/Bed#: -01 Encounter: [id number]  Primary Care Provider: No primary care provider on file.   Date and time admitted to hospital: 6/17/2024  8:39 AM    * Acute on chronic combined systolic and diastolic congestive heart failure (HCC)  Assessment & Plan  Wt Readings from Last 3 Encounters:   06/21/24 50.9 kg (112 lb 3.2 oz)   Shortness of breath elevation of proBNP chest x-ray compliant with CHF 2D echo done in April showed EF of 30 to 35% diastolic dysfunction.  She is only on Lasix as needed at home   She was started on Lasix initially 40 mg IV twice daily had a good response then her creatinine went up   1.8 L fluid restriction Daily weights I and O  Appreciate nephrology as Cr had worsening, now trending down  Follow-up BMP in 1 week with primary care provider  Recommend 20 mg Lasix daily on discharge            Chronic obstructive pulmonary disease with acute exacerbation (HCC)  Assessment & Plan  S/p IV steroids, will wean to and complete short course of steroids with PO prednisone  Nebulizer treatment and Pulmicort twice daily.    Zithromax for 3 days completed    CLINT (acute kidney injury) ([facility])  Assessment & Plan  Due to diuretics  Creatinine now trending down  BMP in 1 week with primary care provider  Follow-up with outpatient nephrologist    Pancytopenia ([facility])  Assessment & Plan  Chronic follows with hematology decline workup in the past.    Platelets are stable    Chronic respiratory failure with hypoxia ([facility])  Assessment & Plan  Chronically on 2 liters continuously     CAD (coronary artery disease)  Assessment & Plan  S/p stent   TAVR  Medical meds    CREST syndrome ([facility])  Assessment & Plan  plaquenil    Parkinsons  Assessment & Plan  Continue Sinemet and Exelon    Paroxysmal atrial fibrillation ([facility])  Assessment & Plan  She is not on anticoagulation offered Watchman declined she is  only on aspirin and currently metoprolol      Medical Problems       Resolved Problems  Date Reviewed: 6/21/2024   None       Discharging Physician / Practitioner: Jatinder Dale DO  PCP: No primary care provider on file.  Admission Date:   Admission Orders (From admission, onward)       Ordered        06/17/24 1046  INPATIENT ADMISSION  Once                          Discharge Date: 06/21/24    Consultations During Hospital Stay:  Nephrology    Procedures Performed:   none    Significant Findings / Test Results:   XR chest portable   Final Result by Colby Carlson MD (06/19 1449)      Nearly resolved CHF/interstitial edema compared with study from 2 days ago            Workstation performed: [id number]         XR chest 1 view portable   Final Result by Gloria Perry MD (06/17 1045)      Suspected interstitial pulmonary edema and trace bilateral pleural fluid.            Workstation performed: [id number]           Lab Results   Component Value Date    WBC 2.45 (L) 06/21/2024    HGB 9.6 (L) 06/21/2024    HCT 31.4 (L) 06/21/2024     (H) 06/21/2024    PLT 57 (L) 06/21/2024       Lab Results   Component Value Date    SODIUM 139 06/21/2024    K 4.2 06/21/2024     06/21/2024    CO2 29 06/21/2024    BUN 49 (H) 06/21/2024    CREATININE 1.25 06/21/2024    GLUC 84 06/21/2024    CALCIUM 7.4 (L) 06/21/2024         Incidental Findings:   See above   I reviewed the above mentioned incidental findings with the patient and/or family and they expressed understanding.    Test Results Pending at Discharge (will require follow up):   none     Outpatient Tests Recommended:  BMP in one week with PCP    Complications:  none    Reason for Admission: Acute congestive heart failure    Hospital Course:   Radha Engel is a 88 y.o. female patient who originally presented to the hospital on 6/17/2024 due to shortness of breath due to acute on chronic congestive heart failure, and mild COPD exacerbation.  Patient  was started on IV steroids and respiratory protocol, as well as IV diuresis and clinically improved.  She was weaned down to her baseline oxygen, with even being weaned to room air, however uses 2 L at home and should continue as she likely needs it during exertion.  She did have an CLINT, likely due to IV diuresis, which was improving at the time of discharge.  She overall symptomatically improved and felt near her baseline and is medically stable for discharge.  Recommend she follow-up with her cardiologist, pulmonologist, nephrologist as an outpatient.  Follow-up blood work in 1 week with her primary care provider to follow her creatinine.  She was started on daily Lasix, she was previously taking as needed, at the time of discharge and to complete a short course of steroids.        Please see above list of diagnoses and related plan for additional information.     Condition at Discharge: good    Discharge Day Visit / Exam:   Subjective: Patient overall feels much better, her breathing feels improved and near her baseline  Vitals: Blood Pressure: 138/83 (06/21/24 0719)  Pulse: 80 (06/20/24 2036)  Temperature: (!) 97.3 °F (36.3 °C) (06/21/24 0719)  Temp Source: Temporal (06/21/24 0719)  Respirations: 19 (06/21/24 0719)  Height: 5' (152.4 cm) (06/17/24 1441)  Weight - Scale: 50.9 kg (112 lb 3.2 oz) (06/21/24 0536)  SpO2: 99 % (06/21/24 0900)  Exam:   Physical Exam  Vitals and nursing note reviewed.   Constitutional:       General: She is not in acute distress.     Appearance: She is well-developed.   HENT:      Head: Normocephalic and atraumatic.   Eyes:      Conjunctiva/sclera: Conjunctivae normal.   Cardiovascular:      Rate and Rhythm: Normal rate and regular rhythm.      Heart sounds: No murmur heard.  Pulmonary:      Effort: Pulmonary effort is normal. No respiratory distress.      Comments: Mildly reduced bilaterally, faint crackles right lower base, no wheezing  Abdominal:      Palpations: Abdomen is soft.       Tenderness: There is no abdominal tenderness.   Musculoskeletal:      Cervical back: Neck supple.      Comments: Trace lower extremity edema bilaterally   Skin:     General: Skin is warm and dry.      Capillary Refill: Capillary refill takes less than 2 seconds.   Neurological:      Mental Status: She is alert.   Psychiatric:         Mood and Affect: Mood normal.          Discussion with Family: Updated  (daughter) via phone.    Discharge instructions/Information to patient and family:   See after visit summary for information provided to patient and family.      Provisions for Follow-Up Care:  See after visit summary for information related to follow-up care and any pertinent home health orders.      Mobility at time of Discharge:   Basic Mobility Inpatient Raw Score: 20  JH-HLM Goal: 6: Walk 10 steps or more  JH-HLM Achieved: 6: Walk 10 steps or more  HLM Goal achieved. Continue to encourage appropriate mobility.     Disposition:   Home with VNA Services (Reminder: Complete face to face encounter)    Planned Readmission: no     Discharge Statement:  I spent 40 minutes discharging the patient. This time was spent on the day of discharge. I had direct contact with the patient on the day of discharge. Greater than 50% of the total time was spent examining patient, answering all patient questions, arranging and discussing plan of care with patient as well as directly providing post-discharge instructions.  Additional time then spent on discharge activities.    Discharge Medications:  See after visit summary for reconciled discharge medications provided to patient and/or family.      **Please Note: This note may have been constructed using a voice recognition system**

## 2024-06-22 LAB
BACTERIA BLD CULT: NORMAL
BACTERIA BLD CULT: NORMAL

## 2024-07-12 ENCOUNTER — HOSPITAL ENCOUNTER (INPATIENT)
Facility: HOSPITAL | Age: 89
LOS: 3 days | Discharge: HOME HEALTH | DRG: 291 | End: 2024-07-15
Attending: EMERGENCY MEDICINE | Admitting: FAMILY MEDICINE
Payer: MEDICARE

## 2024-07-12 ENCOUNTER — APPOINTMENT (EMERGENCY)
Dept: CT IMAGING | Facility: HOSPITAL | Age: 89
DRG: 291 | End: 2024-07-12
Payer: MEDICARE

## 2024-07-12 DIAGNOSIS — I50.9 ACUTE ON CHRONIC CONGESTIVE HEART FAILURE, UNSPECIFIED HEART FAILURE TYPE (HCC): ICD-10-CM

## 2024-07-12 DIAGNOSIS — D61.818 PANCYTOPENIA (HCC): ICD-10-CM

## 2024-07-12 DIAGNOSIS — I50.9 ACUTE ON CHRONIC CONGESTIVE HEART FAILURE (HCC): ICD-10-CM

## 2024-07-12 DIAGNOSIS — R06.00 DYSPNEA: Primary | ICD-10-CM

## 2024-07-12 DIAGNOSIS — K52.9 ENTEROCOLITIS: ICD-10-CM

## 2024-07-12 DIAGNOSIS — I48.0 PAROXYSMAL ATRIAL FIBRILLATION (HCC): ICD-10-CM

## 2024-07-12 DIAGNOSIS — J90 CHRONIC BILATERAL PLEURAL EFFUSIONS: ICD-10-CM

## 2024-07-12 DIAGNOSIS — I21.A1 TYPE 2 MYOCARDIAL INFARCTION (HCC): ICD-10-CM

## 2024-07-12 DIAGNOSIS — R10.9 ABDOMINAL PAIN: ICD-10-CM

## 2024-07-12 LAB
2HR DELTA HS TROPONIN: -31 NG/L
4HR DELTA HS TROPONIN: -19 NG/L
ALBUMIN SERPL BCG-MCNC: 3.6 G/DL (ref 3.5–5)
ALP SERPL-CCNC: 78 U/L (ref 34–104)
ALT SERPL W P-5'-P-CCNC: 4 U/L (ref 7–52)
ANION GAP SERPL CALCULATED.3IONS-SCNC: 9 MMOL/L (ref 4–13)
AST SERPL W P-5'-P-CCNC: 33 U/L (ref 13–39)
ATRIAL RATE: 96 BPM
BASOPHILS # BLD AUTO: 0 THOUSANDS/ÂΜL (ref 0–0.1)
BASOPHILS NFR BLD AUTO: 0 % (ref 0–1)
BILIRUB SERPL-MCNC: 0.8 MG/DL (ref 0.2–1)
BNP SERPL-MCNC: 1918 PG/ML (ref 0–100)
BUN SERPL-MCNC: 24 MG/DL (ref 5–25)
CALCIUM SERPL-MCNC: 8.1 MG/DL (ref 8.4–10.2)
CARDIAC TROPONIN I PNL SERPL HS: 574 NG/L
CARDIAC TROPONIN I PNL SERPL HS: 586 NG/L
CARDIAC TROPONIN I PNL SERPL HS: 605 NG/L
CHLORIDE SERPL-SCNC: 105 MMOL/L (ref 96–108)
CO2 SERPL-SCNC: 21 MMOL/L (ref 21–32)
CREAT SERPL-MCNC: 1.22 MG/DL (ref 0.6–1.3)
EOSINOPHIL # BLD AUTO: 0.02 THOUSAND/ÂΜL (ref 0–0.61)
EOSINOPHIL NFR BLD AUTO: 1 % (ref 0–6)
ERYTHROCYTE [DISTWIDTH] IN BLOOD BY AUTOMATED COUNT: 15.9 % (ref 11.6–15.1)
FLUAV RNA RESP QL NAA+PROBE: NEGATIVE
FLUBV RNA RESP QL NAA+PROBE: NEGATIVE
GFR SERPL CREATININE-BSD FRML MDRD: 40 ML/MIN/1.73SQ M
GLUCOSE SERPL-MCNC: 89 MG/DL (ref 65–140)
HCT VFR BLD AUTO: 30 % (ref 34.8–46.1)
HGB BLD-MCNC: 9.1 G/DL (ref 11.5–15.4)
IMM GRANULOCYTES # BLD AUTO: 0.01 THOUSAND/UL (ref 0–0.2)
IMM GRANULOCYTES NFR BLD AUTO: 1 % (ref 0–2)
LIPASE SERPL-CCNC: 54 U/L (ref 11–82)
LYMPHOCYTES # BLD AUTO: 0.42 THOUSANDS/ÂΜL (ref 0.6–4.47)
LYMPHOCYTES NFR BLD AUTO: 22 % (ref 14–44)
MAGNESIUM SERPL-MCNC: 1.7 MG/DL (ref 1.9–2.7)
MCH RBC QN AUTO: 31.4 PG (ref 26.8–34.3)
MCHC RBC AUTO-ENTMCNC: 30.3 G/DL (ref 31.4–37.4)
MCV RBC AUTO: 103 FL (ref 82–98)
MONOCYTES # BLD AUTO: 0.27 THOUSAND/ÂΜL (ref 0.17–1.22)
MONOCYTES NFR BLD AUTO: 14 % (ref 4–12)
NEUTROPHILS # BLD AUTO: 1.22 THOUSANDS/ÂΜL (ref 1.85–7.62)
NEUTS SEG NFR BLD AUTO: 62 % (ref 43–75)
NRBC BLD AUTO-RTO: 0 /100 WBCS
P AXIS: 80 DEGREES
PLATELET # BLD AUTO: 56 THOUSANDS/UL (ref 149–390)
PMV BLD AUTO: 13 FL (ref 8.9–12.7)
POTASSIUM SERPL-SCNC: 4.1 MMOL/L (ref 3.5–5.3)
PR INTERVAL: 140 MS
PROT SERPL-MCNC: 7.2 G/DL (ref 6.4–8.4)
QRS AXIS: -6 DEGREES
QRSD INTERVAL: 126 MS
QT INTERVAL: 396 MS
QTC INTERVAL: 500 MS
RBC # BLD AUTO: 2.9 MILLION/UL (ref 3.81–5.12)
RSV RNA RESP QL NAA+PROBE: NEGATIVE
SARS-COV-2 RNA RESP QL NAA+PROBE: NEGATIVE
SODIUM SERPL-SCNC: 135 MMOL/L (ref 135–147)
T WAVE AXIS: 178 DEGREES
VENTRICULAR RATE: 96 BPM
WBC # BLD AUTO: 1.94 THOUSAND/UL (ref 4.31–10.16)

## 2024-07-12 PROCEDURE — 99223 1ST HOSP IP/OBS HIGH 75: CPT | Performed by: FAMILY MEDICINE

## 2024-07-12 PROCEDURE — 99285 EMERGENCY DEPT VISIT HI MDM: CPT

## 2024-07-12 PROCEDURE — 74176 CT ABD & PELVIS W/O CONTRAST: CPT

## 2024-07-12 PROCEDURE — 99285 EMERGENCY DEPT VISIT HI MDM: CPT | Performed by: EMERGENCY MEDICINE

## 2024-07-12 PROCEDURE — 80053 COMPREHEN METABOLIC PANEL: CPT | Performed by: EMERGENCY MEDICINE

## 2024-07-12 PROCEDURE — 85025 COMPLETE CBC W/AUTO DIFF WBC: CPT | Performed by: EMERGENCY MEDICINE

## 2024-07-12 PROCEDURE — 83735 ASSAY OF MAGNESIUM: CPT | Performed by: EMERGENCY MEDICINE

## 2024-07-12 PROCEDURE — 93005 ELECTROCARDIOGRAM TRACING: CPT

## 2024-07-12 PROCEDURE — 84484 ASSAY OF TROPONIN QUANT: CPT | Performed by: EMERGENCY MEDICINE

## 2024-07-12 PROCEDURE — 71250 CT THORAX DX C-: CPT

## 2024-07-12 PROCEDURE — 96365 THER/PROPH/DIAG IV INF INIT: CPT

## 2024-07-12 PROCEDURE — 83690 ASSAY OF LIPASE: CPT | Performed by: EMERGENCY MEDICINE

## 2024-07-12 PROCEDURE — 36415 COLL VENOUS BLD VENIPUNCTURE: CPT | Performed by: EMERGENCY MEDICINE

## 2024-07-12 PROCEDURE — 0241U HB NFCT DS VIR RESP RNA 4 TRGT: CPT | Performed by: EMERGENCY MEDICINE

## 2024-07-12 PROCEDURE — 96375 TX/PRO/DX INJ NEW DRUG ADDON: CPT

## 2024-07-12 PROCEDURE — 83880 ASSAY OF NATRIURETIC PEPTIDE: CPT | Performed by: EMERGENCY MEDICINE

## 2024-07-12 RX ORDER — IPRATROPIUM BROMIDE AND ALBUTEROL SULFATE 2.5; .5 MG/3ML; MG/3ML
3 SOLUTION RESPIRATORY (INHALATION) ONCE
Status: COMPLETED | OUTPATIENT
Start: 2024-07-12 | End: 2024-07-12

## 2024-07-12 RX ORDER — RIVASTIGMINE TARTRATE 1.5 MG/1
1.5 CAPSULE ORAL 2 TIMES DAILY
Status: DISCONTINUED | OUTPATIENT
Start: 2024-07-12 | End: 2024-07-15 | Stop reason: HOSPADM

## 2024-07-12 RX ORDER — FUROSEMIDE 10 MG/ML
40 INJECTION INTRAMUSCULAR; INTRAVENOUS 2 TIMES DAILY
Status: DISCONTINUED | OUTPATIENT
Start: 2024-07-12 | End: 2024-07-13

## 2024-07-12 RX ORDER — PANTOPRAZOLE SODIUM 40 MG/10ML
40 INJECTION, POWDER, LYOPHILIZED, FOR SOLUTION INTRAVENOUS ONCE
Status: COMPLETED | OUTPATIENT
Start: 2024-07-12 | End: 2024-07-12

## 2024-07-12 RX ORDER — QUETIAPINE FUMARATE 25 MG/1
50 TABLET, FILM COATED ORAL
Status: DISCONTINUED | OUTPATIENT
Start: 2024-07-12 | End: 2024-07-15 | Stop reason: HOSPADM

## 2024-07-12 RX ORDER — FUROSEMIDE 10 MG/ML
80 INJECTION INTRAMUSCULAR; INTRAVENOUS ONCE
Status: COMPLETED | OUTPATIENT
Start: 2024-07-12 | End: 2024-07-12

## 2024-07-12 RX ORDER — LEVOTHYROXINE SODIUM 88 UG/1
88 TABLET ORAL DAILY
Status: DISCONTINUED | OUTPATIENT
Start: 2024-07-13 | End: 2024-07-15 | Stop reason: HOSPADM

## 2024-07-12 RX ORDER — METOPROLOL SUCCINATE 50 MG/1
50 TABLET, EXTENDED RELEASE ORAL 2 TIMES DAILY
Status: DISCONTINUED | OUTPATIENT
Start: 2024-07-12 | End: 2024-07-13

## 2024-07-12 RX ORDER — ATORVASTATIN CALCIUM 40 MG/1
40 TABLET, FILM COATED ORAL DAILY
Status: DISCONTINUED | OUTPATIENT
Start: 2024-07-13 | End: 2024-07-15 | Stop reason: HOSPADM

## 2024-07-12 RX ORDER — PANTOPRAZOLE SODIUM 40 MG/1
40 TABLET, DELAYED RELEASE ORAL 2 TIMES DAILY
Status: DISCONTINUED | OUTPATIENT
Start: 2024-07-12 | End: 2024-07-15 | Stop reason: HOSPADM

## 2024-07-12 RX ORDER — MAGNESIUM SULFATE HEPTAHYDRATE 40 MG/ML
2 INJECTION, SOLUTION INTRAVENOUS ONCE
Status: COMPLETED | OUTPATIENT
Start: 2024-07-12 | End: 2024-07-12

## 2024-07-12 RX ORDER — ONDANSETRON 2 MG/ML
4 INJECTION INTRAMUSCULAR; INTRAVENOUS ONCE
Status: COMPLETED | OUTPATIENT
Start: 2024-07-12 | End: 2024-07-12

## 2024-07-12 RX ORDER — ACETAMINOPHEN 325 MG/1
650 TABLET ORAL EVERY 6 HOURS PRN
Status: DISCONTINUED | OUTPATIENT
Start: 2024-07-12 | End: 2024-07-15 | Stop reason: HOSPADM

## 2024-07-12 RX ORDER — ALBUTEROL SULFATE 90 UG/1
2 AEROSOL, METERED RESPIRATORY (INHALATION) EVERY 6 HOURS PRN
Status: DISCONTINUED | OUTPATIENT
Start: 2024-07-12 | End: 2024-07-15 | Stop reason: HOSPADM

## 2024-07-12 RX ADMIN — ONDANSETRON 4 MG: 2 INJECTION INTRAMUSCULAR; INTRAVENOUS at 08:02

## 2024-07-12 RX ADMIN — METOPROLOL SUCCINATE 50 MG: 50 TABLET, EXTENDED RELEASE ORAL at 17:09

## 2024-07-12 RX ADMIN — PANTOPRAZOLE SODIUM 40 MG: 40 INJECTION, POWDER, LYOPHILIZED, FOR SOLUTION INTRAVENOUS at 08:02

## 2024-07-12 RX ADMIN — CARBIDOPA AND LEVODOPA 1 TABLET: 25; 100 TABLET ORAL at 21:12

## 2024-07-12 RX ADMIN — CARBIDOPA AND LEVODOPA 1 TABLET: 25; 100 TABLET ORAL at 17:09

## 2024-07-12 RX ADMIN — IPRATROPIUM BROMIDE AND ALBUTEROL SULFATE 3 ML: 2.5; .5 SOLUTION RESPIRATORY (INHALATION) at 07:58

## 2024-07-12 RX ADMIN — PANTOPRAZOLE SODIUM 40 MG: 40 TABLET, DELAYED RELEASE ORAL at 17:09

## 2024-07-12 RX ADMIN — MAGNESIUM SULFATE HEPTAHYDRATE 2 G: 40 INJECTION, SOLUTION INTRAVENOUS at 08:49

## 2024-07-12 RX ADMIN — FUROSEMIDE 40 MG: 10 INJECTION, SOLUTION INTRAMUSCULAR; INTRAVENOUS at 17:09

## 2024-07-12 RX ADMIN — RIVASTIGMINE TARTRATE 1.5 MG: 1.5 CAPSULE ORAL at 17:09

## 2024-07-12 RX ADMIN — MORPHINE SULFATE 2 MG: 2 INJECTION, SOLUTION INTRAMUSCULAR; INTRAVENOUS at 08:02

## 2024-07-12 RX ADMIN — FUROSEMIDE 80 MG: 10 INJECTION, SOLUTION INTRAMUSCULAR; INTRAVENOUS at 08:49

## 2024-07-12 RX ADMIN — QUETIAPINE FUMARATE 50 MG: 25 TABLET ORAL at 21:12

## 2024-07-12 NOTE — ASSESSMENT & PLAN NOTE
Seems to also have mild COPD flare will place on IV steroids DuoNeb treatments and observe response.  On 2 L of oxygen continuously at baseline.  Currently at her baseline

## 2024-07-12 NOTE — PLAN OF CARE
Problem: PAIN - ADULT  Goal: Verbalizes/displays adequate comfort level or baseline comfort level  Description: Interventions:  - Encourage patient to monitor pain and request assistance  - Assess pain using appropriate pain scale  - Administer analgesics based on type and severity of pain and evaluate response  - Implement non-pharmacological measures as appropriate and evaluate response  - Consider cultural and social influences on pain and pain management  - Notify physician/advanced practitioner if interventions unsuccessful or patient reports new pain  Outcome: Progressing     Problem: SAFETY ADULT  Goal: Patient will remain free of falls  Description: INTERVENTIONS:  - Educate patient/family on patient safety including physical limitations  - Instruct patient to call for assistance with activity   - Consult OT/PT to assist with strengthening/mobility   - Keep Call bell within reach  - Keep bed low and locked with side rails adjusted as appropriate  - Keep care items and personal belongings within reach  - Initiate and maintain comfort rounds  - Make Fall Risk Sign visible to staff  - Offer Toileting every 2 Hours, in advance of need  - Initiate/Maintain bed alarm  - Obtain necessary fall risk management equipment: non skid socks  - Apply yellow socks and bracelet for high fall risk patients  - Consider moving patient to room near nurses station  Outcome: Progressing  Goal: Maintain or return to baseline ADL function  Description: INTERVENTIONS:  -  Assess patient's ability to carry out ADLs; assess patient's baseline for ADL function and identify physical deficits which impact ability to perform ADLs (bathing, care of mouth/teeth, toileting, grooming, dressing, etc.)  - Assess/evaluate cause of self-care deficits   - Assess range of motion  - Assess patient's mobility; develop plan if impaired  - Assess patient's need for assistive devices and provide as appropriate  - Encourage maximum independence but  intervene and supervise when necessary  - Involve family in performance of ADLs  - Assess for home care needs following discharge   - Consider OT consult to assist with ADL evaluation and planning for discharge  - Provide patient education as appropriate  Outcome: Progressing  Goal: Maintains/Returns to pre admission functional level  Description: INTERVENTIONS:  - Perform AM-PAC 6 Click Basic Mobility/ Daily Activity assessment daily.  - Set and communicate daily mobility goal to care team and patient/family/caregiver.   - Collaborate with rehabilitation services on mobility goals if consulted  - Perform Range of Motion 3 times a day.  - Reposition patient every 2 hours.  - Dangle patient 2 times a day  - Stand patient 2 times a day  - Ambulate patient 2 times a day  - Out of bed to chair 2 times a day   - Out of bed for meals 2 times a day  - Out of bed for toileting  - Record patient progress and toleration of activity level   Outcome: Progressing     Problem: DISCHARGE PLANNING  Goal: Discharge to home or other facility with appropriate resources  Description: INTERVENTIONS:  - Identify barriers to discharge w/patient and caregiver  - Arrange for needed discharge resources and transportation as appropriate  - Identify discharge learning needs (meds, wound care, etc.)  - Arrange for interpretive services to assist at discharge as needed  - Refer to Case Management Department for coordinating discharge planning if the patient needs post-hospital services based on physician/advanced practitioner order or complex needs related to functional status, cognitive ability, or social support system  Outcome: Progressing     Problem: Knowledge Deficit  Goal: Patient/family/caregiver demonstrates understanding of disease process, treatment plan, medications, and discharge instructions  Description: Complete learning assessment and assess knowledge base.  Interventions:  - Provide teaching at level of understanding  - Provide  teaching via preferred learning methods  Outcome: Progressing

## 2024-07-12 NOTE — CASE MANAGEMENT
Case Management Assessment & Discharge Planning Note    Patient name Radha Engel  Location /-01 MRN [id number]  : 1935 Date 2024       Current Admission Date: 2024  Current Admission Diagnosis:Enterocolitis, Dyspnea, SOB (shortness of breath), Abdominal pain, Pancytopenia (HCC), Acute on chronic congestive heart failure (HCC), Type 2 myocardial infarction (HCC), Chronic bilateral pleural effusions   Patient Active Problem List    Diagnosis Date Noted Date Diagnosed    CLINT (acute kidney injury) (HCC) 2024     Acute on chronic combined systolic and diastolic congestive heart failure (HCC) 2024     Chronic obstructive pulmonary disease with acute exacerbation (HCC) 2024     Chronic respiratory failure with hypoxia (HCC) 2024     Pancytopenia ([facility]) 2024     Paroxysmal atrial fibrillation ([facility]) 2024     Parkinsons 2024     CREST syndrome ([facility]) 2024     CAD (coronary artery disease) 2024       LOS (days): 0  Geometric Mean LOS (GMLOS) (days):   Days to GMLOS:     OBJECTIVE:  PATIENT READMITTED TO HOSPITAL  Risk of Unplanned Readmission Score: 16.18         Current admission status: Inpatient  Referral Reason: Other (readmission)    [facility]acy:   [id number] [address] [address]  Phone: [phone] Fax: [phone]    Primary Care Provider: Dayana Glaser PA-C    Primary Insurance: MEDICARE  Secondary Insurance: BLUE CROSS    ASSESSMENT:  Active Health Care Proxies    There are no active Health Care Proxies on file.       Advance Directives  Does patient have a Health Care POA?: Yes  Does patient have Advance Directives?: Yes  Advance Directives: Living will, Power of  for health care  Primary Contact: Negrito (Son)         Readmission Root Cause  30 Day Readmission: Yes  Who directed you to return to the hospital?: Family  Did you understand whom  to contact if you had questions or problems?: Yes (family is aware)  Did you get your prescriptions before you left the hospital?: No  Reason:: Preference for own pharmacy  Were you able to get your prescriptions filled when you left the hospital?: Yes  Did you take your medications as prescribed?: Yes  Were you able to get to your follow-up appointments?: Yes  Patient was readmitted due to: SOB, fluid overload  Action Plan: o2, tele monitoring, to be evaluated by IP provider    Patient Information  Admitted from:: Home  Mental Status: Alert  During Assessment patient was accompanied by: Not accompanied during assessment  Assessment information provided by:: Patient  Primary Caregiver: Self  Support Systems: Children  County of Residence: [facility]  What city do you live in?: [place]  Home entry access options. Select all that apply.: Stairs  Number of steps to enter home.: 1  Do the steps have railings?: No  Type of Current Residence: Bi-level  Upon entering residence, is there a bedroom on the main floor (no further steps)?: No  A bedroom is located on the following floor levels of residence (select all that apply):: 2nd Floor  Upon entering residence, is there a bathroom on the main floor (no further steps)?: No  Indicate which floors of current residence have a bathroom (select all the apply):: 2nd Floor  Number of steps to 2nd floor from main floor: 6  Living Arrangements: Lives w/ Son  Is patient a ?: No    Activities of Daily Living Prior to Admission  Functional Status: Independent  Completes ADLs independently?: Yes  Ambulates independently?: Yes  Does patient use assisted devices?: Yes  Assisted Devices (DME) used: Walker, Bedside Commode, Shower Chair, Home Oxygen concentrator, Portable Oxygen tanks  DME Company Name (respiratory supplies): cannot remember name  O2 Rate(s): 2L PRN  Does patient currently own DME?: Yes  What DME does the patient currently own?: Bedside Commode, Walker, Portable  Oxygen tanks, Home Oxygen concentrator, Shower Chair  Does patient have a history of Outpatient Therapy (PT/OT)?: No  Does the patient have a history of Short-Term Rehab?: Yes ([facility])  Does patient have a history of HHC?: Yes ([facility])  Does patient currently have HHC?: Yes ([facility])    Current Home Health Care  Type of Current Home Care Services: Nurse visit, Home PT  Current Home Health Agency:: [facility]    Patient Information Continued  Income Source: SSI/SSD  Does patient have prescription coverage?: Yes  Does patient receive dialysis treatments?: No  Does patient have a history of substance abuse?: No  Does patient have a history of Mental Health Diagnosis?: No         Means of Transportation  Means of Transport to Appts:: Family transport      Social Determinants of Health (SDOH)      Flowsheet Row Most Recent Value   Housing Stability    In the last 12 months, was there a time when you were not able to pay the mortgage or rent on time? N   At any time in the past 12 months, were you homeless or living in a shelter (including now)? N   Transportation Needs    In the past 12 months, has lack of transportation kept you from medical appointments or from getting medications? no   In the past 12 months, has lack of transportation kept you from meetings, work, or from getting things needed for daily living? No   Food Insecurity    Within the past 12 months, you worried that your food would run out before you got the money to buy more. Never true   Within the past 12 months, the food you bought just didn't last and you didn't have money to get more. Never true   Utilities    In the past 12 months has the electric, gas, oil, or water company threatened to shut off services in your home? No            DISCHARGE DETAILS:    Discharge planning discussed with:: Patient  Freedom of Choice: Yes  Comments - Freedom of Choice: Patient does not want any services at discharge unless absolutely  needed  CM contacted family/caregiver?: Yes (message left for daughter)  Were Treatment Team discharge recommendations reviewed with patient/caregiver?: Yes  Did patient/caregiver verbalize understanding of patient care needs?: Yes  Were patient/caregiver advised of the risks associated with not following Treatment Team discharge recommendations?: Yes    Contacts  Patient Contacts: Chiquis (Daughter)  Relationship to Patient:: Family  Contact Method: Phone  Phone Number: [phone]  Reason/Outcome: Continuity of Care, Discharge Planning                   Would you like to participate in our [facility] service program?  : No - Declined           CM met with patient at the bedside,baseline information  was obtained. CM discussed the role of CM in helping the patient develop a discharge plan and assist the patient in carry out their plan.  Patient reports to be IPTA and uses a walker at baseline and O2 PRN. Patient was unsure what O2 company she has and told CM they can call her daughter to get this information. Patient active with [facility] for SN and PT.   CM discussed discharge plan - patient does not want any services at discharge if possible.     CM  left message for patient's daughter to discuss discharge plan.     CM submitted referral into [facility] for coordination of care.     CM to follow patient's care and discharge needs.

## 2024-07-12 NOTE — H&P
[facility]  H&P  Name: Radha Engel 88 y.o. female I MRN: [id number]  Unit/Bed#: -01 I Date of Admission: 7/12/2024   Date of Service: 7/12/2024 I Hospital Day: 0      Assessment & Plan   * Acute on chronic combined systolic and diastolic congestive heart failure (HCC)  Assessment & Plan  Wt Readings from Last 3 Encounters:   07/12/24 51.7 kg (114 lb)   06/21/24 50.9 kg (112 lb 3.2 oz)   The echo from 04/24 shows EF of 30 to 35%.  Patient was taking oral Lasix at home however got congested again.  ct chest shows New bilateral pleural effusions. Areas of patchy groundglass opacity could represent mild edema or atypical pneumonitis.   Will place on Lasix 40 mg IV twice daily and assess response.  Monitor Renal function and blood pressure closely            Parkinsons  Assessment & Plan  Continue Sinemet    Paroxysmal atrial fibrillation (HCC)  Assessment & Plan  Currently rate controlled continue beta-blockers no anticoagulation due to pancytopenia    Pancytopenia (HCC)  Assessment & Plan  Chronic issue noted in the past we will discuss with her further.  Avoid DVT prophylaxis    Chronic respiratory failure with hypoxia (HCC)  Assessment & Plan  2 L of oxygen continuously at baseline    Chronic obstructive pulmonary disease with acute exacerbation (HCC)  Assessment & Plan  Seems to also have mild COPD flare will place on IV steroids DuoNeb treatments and observe response.  On 2 L of oxygen continuously at baseline.  Currently at her baseline    Elevated troponin:prob secondary to chf and cont med management   VTE Prophylaxis: Pharmacologic VTE Prophylaxis contraindicated due to pancytopenia   / sequential compression device   Code Status: dnr/dni  POLST: There is no POLST form on file for this patient (pre-hospital)  Discussion with family: will update    Anticipated Length of Stay:  Patient will be admitted on an Inpatient basis with an anticipated length of stay of  more than  2 midnights.   Justification for Hospital Stay: acute on chronic systolic chf    Total Time for Visit, including Counseling / Coordination of Care: 90 minutes.  Greater than 50% of this total time spent on direct patient counseling and coordination of care.    Chief Complaint:   Shortness of breath    History of Present Illness:    Radha Engel is a 88 y.o. female who presents with shortness of breath and abdominal discomfort started 2 to 3 days ago however she did not want to come back to the hospital so she is Trying to delayed and finally her family brought her in.  She states that she was recently in a nursing home after hospital stay and went home and now was doing well up until 3 days ago she did take an extra dose of Lasix because she thought that she was filling up with fluid however did not help.  Some dry heaving with scant mucus also noted    Review of Systems:    Review of Systems   Constitutional:  Positive for appetite change and fatigue. Negative for chills and fever.   HENT:  Negative for hearing loss, sore throat and trouble swallowing.    Eyes:  Negative for photophobia, discharge and visual disturbance.   Respiratory:  Positive for cough and shortness of breath. Negative for chest tightness.    Cardiovascular:  Negative for chest pain and palpitations.   Gastrointestinal:  Positive for nausea. Negative for abdominal pain, blood in stool and vomiting.   Endocrine: Negative for polydipsia and polyuria.   Genitourinary:  Negative for difficulty urinating, dysuria, flank pain and hematuria.   Musculoskeletal:  Negative for back pain and gait problem.   Skin:  Negative for rash.   Allergic/Immunologic: Negative for environmental allergies and food allergies.   Neurological:  Negative for dizziness, seizures, syncope and headaches.   Hematological:  Does not bruise/bleed easily.   Psychiatric/Behavioral:  Negative for behavioral problems.    All other systems reviewed and are negative.      Past  Medical and Surgical History:     Past Medical History:   Diagnosis Date    A-fib (HCC)     CHF (congestive heart failure) (HCC)     COPD (chronic obstructive pulmonary disease) (HCC)     Coronary artery disease     Disease of thyroid gland     Hypertension     MI, old     Pancytopenia (HCC)     Renal disorder     Stroke (HCC)        Past Surgical History:   Procedure Laterality Date    CARDIAC TAVR         Meds/Allergies:    Prior to Admission medications    Medication Sig Start Date End Date Taking? Authorizing Provider   albuterol (PROVENTIL HFA,VENTOLIN HFA) 90 mcg/act inhaler Inhale 2 puffs every 6 (six) hours as needed for wheezing   Yes Historical Provider, MD   aspirin (ECOTRIN LOW STRENGTH) 81 mg EC tablet Take 1 tablet (81 mg total) by mouth daily 6/22/24  Yes Jatinder Dale DO   atorvastatin (LIPITOR) 40 mg tablet Take 40 mg by mouth daily 5/23/24 5/23/25 Yes Historical Provider, MD   carbidopa-levodopa (SINEMET)  mg per tablet Take 1 tablet by mouth 3 (three) times a day   Yes Historical Provider, MD   furosemide (LASIX) 20 mg tablet Take 1 tablet (20 mg total) by mouth daily 6/22/24  Yes Jatinder Dale DO   gabapentin (NEURONTIN) 300 mg capsule Take 300 mg by mouth every morning 300 mg afternoon and 600 mg at night 5/3/24  Yes Historical Provider, MD   hydroxychloroquine (PLAQUENIL) 200 mg tablet Take 200 mg by mouth daily with breakfast   Yes Historical Provider, MD   levothyroxine 88 mcg tablet Take 88 mcg by mouth daily 7/13/23 5/23/25 Yes Historical Provider, MD   metoprolol succinate (TOPROL-XL) 50 mg 24 hr tablet Take 50 mg by mouth 2 (two) times a day 5/23/24  Yes Historical Provider, MD   pantoprazole (PROTONIX) 40 mg tablet Take 40 mg by mouth 2 (two) times a day 5/23/24  Yes Historical Provider, MD   QUEtiapine (SEROquel) 50 mg tablet Take 50 mg by mouth daily at bedtime   Yes Historical Provider, MD   rivastigmine (EXELON) 1.5 mg capsule Take 1.5 mg by mouth 2 (two) times a  "day   Yes Historical Provider, MD   cholecalciferol (VITAMIN D3) 25 mcg (1,000 units) tablet Take 1 tablet by mouth daily  Patient not taking: Reported on 6/17/2024 7/12/24  Historical Provider, MD   estradiol (ESTRACE) 0.1 mg/g vaginal cream Insert 2 g into the vagina daily  Patient not taking: Reported on 6/17/2024 7/12/24  Historical Provider, MD   ferrous sulfate 325 (65 Fe) mg tablet Take 325 mg by mouth daily with breakfast  Patient not taking: Reported on 6/17/2024 7/12/24  Historical Provider, MD     I have reviewed home medications with patient personally.    Allergies:   Allergies   Allergen Reactions    Iodinated Contrast Media Itching     Other reaction(s): itch, rash       Social History:     Marital Status:      Social History     Substance and Sexual Activity   Alcohol Use Not Currently     Social History     Tobacco Use   Smoking Status Never   Smokeless Tobacco Never     Social History     Substance and Sexual Activity   Drug Use Never       Family History:    Family History   Problem Relation Age of Onset    Hypertension Father        Physical Exam:     Vitals:   Blood Pressure: 135/70 (07/12/24 1618)  Pulse: 82 (07/12/24 1618)  Temperature: (!) 97.1 °F (36.2 °C) (07/12/24 1618)  Temp Source: Temporal (07/12/24 1618)  Respirations: 18 (07/12/24 1618)  Height: 5' 1\" (154.9 cm) (07/12/24 1618)  Weight - Scale: 51.7 kg (114 lb) (07/12/24 1618)  SpO2: 97 % (07/12/24 1525)    Physical Exam  Vitals and nursing note reviewed.   Constitutional:       Appearance: She is ill-appearing.   HENT:      Head: Normocephalic and atraumatic.      Right Ear: External ear normal.      Left Ear: External ear normal.      Nose: Nose normal.      Mouth/Throat:      Pharynx: Oropharynx is clear.   Eyes:      Pupils: Pupils are equal, round, and reactive to light.   Cardiovascular:      Rate and Rhythm: Normal rate and regular rhythm.      Heart sounds: Normal heart sounds.   Pulmonary:      Effort: Pulmonary " effort is normal.      Breath sounds: Rales present.   Abdominal:      General: Bowel sounds are normal.      Palpations: Abdomen is soft.      Tenderness: There is no abdominal tenderness.   Musculoskeletal:         General: Normal range of motion.      Cervical back: Normal range of motion and neck supple.   Skin:     General: Skin is warm and dry.      Capillary Refill: Capillary refill takes less than 2 seconds.   Neurological:      General: No focal deficit present.      Mental Status: She is alert and oriented to person, place, and time.   Psychiatric:         Mood and Affect: Mood normal.           Additional Data:     Lab Results: I have personally reviewed pertinent reports.      Results from last 7 days   Lab Units 07/12/24  0750   WBC Thousand/uL 1.94*   HEMOGLOBIN g/dL 9.1*   HEMATOCRIT % 30.0*   PLATELETS Thousands/uL 56*   SEGS PCT % 62   LYMPHO PCT % 22   MONO PCT % 14*   EOS PCT % 1     Results from last 7 days   Lab Units 07/12/24  0750   SODIUM mmol/L 135   POTASSIUM mmol/L 4.1   CHLORIDE mmol/L 105   CO2 mmol/L 21   BUN mg/dL 24   CREATININE mg/dL 1.22   ANION GAP mmol/L 9   CALCIUM mg/dL 8.1*   ALBUMIN g/dL 3.6   TOTAL BILIRUBIN mg/dL 0.80   ALK PHOS U/L 78   ALT U/L 4*   AST U/L 33   GLUCOSE RANDOM mg/dL 89                       Imaging: I have personally reviewed pertinent reports.      CT chest abdomen pelvis wo contrast   Final Result by Tommie Peraza MD (07/12 1007)      New bilateral pleural effusions. Areas of patchy groundglass opacity could represent mild edema or atypical pneumonitis.      Background of chronic paraseptal emphysema versus fibrotic changes with honeycombing.      Post distention of the esophagus with debris and thickening could be related to esophagitis and should be correlated any history of dysphagia/chest pain. Aspiration pneumonia is a consideration given the esophageal findings. GI follow-up is advised.      The abdomen demonstrates some fluid-filled loops of  bowel which could be related to enterocolitis without obvious point of transition to suggest obstruction at this time.      Chronic compression fractures of L1 and L2.         This was discussed with Dr. Mcmanus at 9:30 a.m.      Workstation performed: [id number]             EKG, Pathology, and Other Studies Reviewed on Admission:   EKG: reviewed    Allscripts / Epic Records Reviewed: Yes     ** Please Note: This note has been constructed using a voice recognition system. **

## 2024-07-12 NOTE — ASSESSMENT & PLAN NOTE
Wt Readings from Last 3 Encounters:   07/12/24 51.7 kg (114 lb)   06/21/24 50.9 kg (112 lb 3.2 oz)   The echo from 04/24 shows EF of 30 to 35%.  Patient was taking oral Lasix at home however got congested again.  ct chest shows New bilateral pleural effusions. Areas of patchy groundglass opacity could represent mild edema or atypical pneumonitis.   Will place on Lasix 40 mg IV twice daily and assess response.  Monitor Renal function and blood pressure closely

## 2024-07-12 NOTE — ED NOTES
Dietary at bedside delivering lunch tray.  Pt. Tolerating PO foods and fluids      Onur Baeza RN  07/12/24 [id number]

## 2024-07-12 NOTE — ED PROVIDER NOTES
"History  Chief Complaint   Patient presents with    Abdominal Pain     Began 2 days ago. Complaints of weakness and SOB with exertion. Recent admission.     Shortness of Breath     88-year-old female returning to the emergency room after she was just discharged from our facility 21 days ago.  Returning again for shortness of breath.  Patient has history of COPD and CHF.  Also with chronic kidney disease as well as chronic atrial fibrillation and CAD.    Son who is present at bedside reports that the patient was \"really good\" until about 3 days ago.  Patient takes Lasix 40 mg a day as per his report and they gave her an extra dose last evening to see if this would help.  When it did not, they brought the patient the emergency room for further evaluation.  Patient is also complaining of upper abdominal pain.  Nonradiating associated with dry heaves which she describes as \"mucus.\"      History provided by:  Patient and relative  Abdominal Pain  Pain location:  Epigastric  Pain quality: aching, sharp and shooting    Pain radiates to:  Does not radiate  Pain severity:  Moderate  Onset quality:  Gradual  Associated symptoms: fatigue, nausea, shortness of breath and vomiting    Associated symptoms: no chest pain, no cough, no diarrhea and no fever    Risk factors: being elderly    Shortness of Breath  Severity:  Moderate  Onset quality:  Gradual  Duration:  12 hours  Timing:  Constant  Progression:  Worsening  Associated symptoms: abdominal pain, vomiting and wheezing    Associated symptoms: no chest pain, no cough, no diaphoresis and no fever        Prior to Admission Medications   Prescriptions Last Dose Informant Patient Reported? Taking?   QUEtiapine (SEROquel) 50 mg tablet   Yes No   Sig: Take 50 mg by mouth daily at bedtime   albuterol (PROVENTIL HFA,VENTOLIN HFA) 90 mcg/act inhaler   Yes No   Sig: Inhale 2 puffs every 6 (six) hours as needed for wheezing   aspirin (ECOTRIN LOW STRENGTH) 81 mg EC tablet   No No "   Sig: Take 1 tablet (81 mg total) by mouth daily   atorvastatin (LIPITOR) 40 mg tablet   Yes No   Sig: Take 40 mg by mouth daily   carbidopa-levodopa (SINEMET)  mg per tablet   Yes No   Sig: Take 1 tablet by mouth 3 (three) times a day   cholecalciferol (VITAMIN D3) 25 mcg (1,000 units) tablet   Yes No   Sig: Take 1 tablet by mouth daily   Patient not taking: Reported on 6/17/2024   estradiol (ESTRACE) 0.1 mg/g vaginal cream   Yes No   Sig: Insert 2 g into the vagina daily   Patient not taking: Reported on 6/17/2024   ferrous sulfate 325 (65 Fe) mg tablet   Yes No   Sig: Take 325 mg by mouth daily with breakfast   Patient not taking: Reported on 6/17/2024   furosemide (LASIX) 20 mg tablet   No No   Sig: Take 1 tablet (20 mg total) by mouth daily   gabapentin (NEURONTIN) 300 mg capsule   Yes No   Sig: Take 300 mg by mouth every morning 300 mg afternoon and 600 mg at night   hydroxychloroquine (PLAQUENIL) 200 mg tablet   Yes No   Sig: Take 200 mg by mouth daily with breakfast   levothyroxine 88 mcg tablet   Yes No   Sig: Take 88 mcg by mouth daily   metoprolol succinate (TOPROL-XL) 50 mg 24 hr tablet   Yes No   Sig: Take 50 mg by mouth 2 (two) times a day   pantoprazole (PROTONIX) 40 mg tablet   Yes No   Sig: Take 40 mg by mouth 2 (two) times a day   rivastigmine (EXELON) 1.5 mg capsule   Yes No   Sig: Take 1.5 mg by mouth 2 (two) times a day      Facility-Administered Medications: None       Past Medical History:   Diagnosis Date    A-fib (HCC)     CHF (congestive heart failure) (HCC)     COPD (chronic obstructive pulmonary disease) (HCC)     Coronary artery disease     Disease of thyroid gland     Hypertension     MI, old     Pancytopenia (HCC)     Renal disorder     Stroke (HCC)        Past Surgical History:   Procedure Laterality Date    CARDIAC TAVR         History reviewed. No pertinent family history.  I have reviewed and agree with the history as documented.    E-Cigarette/Vaping    E-Cigarette Use  Never User      E-Cigarette/Vaping Substances     Social History     Tobacco Use    Smoking status: Never    Smokeless tobacco: Never   Vaping Use    Vaping status: Never Used   Substance Use Topics    Alcohol use: Not Currently    Drug use: Never       Review of Systems   Constitutional:  Positive for fatigue. Negative for diaphoresis and fever.   Respiratory:  Positive for shortness of breath and wheezing. Negative for cough.    Cardiovascular:  Negative for chest pain, palpitations and leg swelling.   Gastrointestinal:  Positive for abdominal pain, nausea and vomiting. Negative for diarrhea.   Psychiatric/Behavioral:  The patient is nervous/anxious.    All other systems reviewed and are negative.      Physical Exam  Physical Exam  Vitals and nursing note reviewed.   Constitutional:       General: She is in acute distress.      Appearance: She is normal weight. She is not ill-appearing or toxic-appearing.   HENT:      Head: Normocephalic and atraumatic.      Right Ear: External ear normal.      Left Ear: External ear normal.      Nose: Nose normal.      Mouth/Throat:      Mouth: Mucous membranes are dry.   Cardiovascular:      Rate and Rhythm: Normal rate.   Pulmonary:      Effort: Pulmonary effort is normal. Tachypnea present. No respiratory distress.      Breath sounds: No stridor. Decreased breath sounds and wheezing present. No rhonchi or rales.   Abdominal:      General: Abdomen is flat. There is no distension.      Tenderness: There is abdominal tenderness in the epigastric area. There is no right CVA tenderness or left CVA tenderness.      Hernia: No hernia is present.   Musculoskeletal:         General: No signs of injury.      Cervical back: Normal range of motion.   Skin:     Coloration: Skin is not pale.      Comments: Chronic lower extremity skin discoloration   Neurological:      General: No focal deficit present.      Mental Status: She is alert.      Motor: No weakness.   Psychiatric:         Mood  and Affect: Mood is anxious.         Thought Content: Thought content normal.         Judgment: Judgment normal.         Vital Signs  ED Triage Vitals   Temperature Pulse Respirations Blood Pressure SpO2   07/12/24 0728 07/12/24 0728 07/12/24 0728 07/12/24 0728 07/12/24 0728   (!) 97 °F (36.1 °C) 103 20 147/79 (!) 88 %      Temp Source Heart Rate Source Patient Position - Orthostatic VS BP Location FiO2 (%)   07/12/24 0728 07/12/24 0851 07/12/24 0728 07/12/24 0728 --   Temporal Monitor Lying Left arm       Pain Score       07/12/24 0728       6           Vitals:    07/12/24 0728 07/12/24 0851   BP: 147/79 122/87   Pulse: 103 88   Patient Position - Orthostatic VS: Lying Lying         Visual Acuity      ED Medications  Medications   magnesium sulfate 2 g/50 mL IVPB (premix) 2 g (2 g Intravenous New Bag 7/12/24 0849)   morphine injection 2 mg (2 mg Intravenous Given 7/12/24 0802)   ondansetron (ZOFRAN) injection 4 mg (4 mg Intravenous Given 7/12/24 0802)   pantoprazole (PROTONIX) injection 40 mg (40 mg Intravenous Given 7/12/24 0802)   ipratropium-albuterol (DUO-NEB) 0.5-2.5 mg/3 mL inhalation solution 3 mL (3 mL Nebulization Given 7/12/24 0758)   furosemide (LASIX) injection 80 mg (80 mg Intravenous Given 7/12/24 0849)       Diagnostic Studies  Results Reviewed       Procedure Component Value Units Date/Time    HS Troponin I 2hr [[id number]] Collected: 07/12/24 0928    Lab Status: No result Specimen: Blood from Arm, Left     B-Type Natriuretic Peptide(BNP) [[id number]]  (Abnormal) Collected: 07/12/24 0750    Lab Status: Final result Specimen: Blood from Arm, Left Updated: 07/12/24 0854     BNP 1,918 pg/mL     FLU/RSV/COVID - if FLU/RSV clinically relevant [[id number]]  (Normal) Collected: 07/12/24 0750    Lab Status: Final result Specimen: Nares from Nose Updated: 07/12/24 0834     SARS-CoV-2 Negative     INFLUENZA A PCR Negative     INFLUENZA B PCR Negative     RSV PCR Negative    Narrative:      FOR PEDIATRIC PATIENTS  - copy/paste COVID Guidelines URL to browser: [email or web address]    SARS-CoV-2 assay is a Nucleic Acid Amplification assay intended for the  qualitative detection of nucleic acid from SARS-CoV-2 in nasopharyngeal  swabs. Results are for the presumptive identification of SARS-CoV-2 RNA.    Positive results are indicative of infection with SARS-CoV-2, the virus  causing COVID-19, but do not rule out bacterial infection or co-infection  with other viruses. Laboratories within the [place] and its  territories are required to report all positive results to the appropriate  public health authorities. Negative results do not preclude SARS-CoV-2  infection and should not be used as the sole basis for treatment or other  patient management decisions. Negative results must be combined with  clinical observations, patient history, and epidemiological information.  This test has not been FDA cleared or approved.    This test has been authorized by FDA under an Emergency Use Authorization  (EUA). This test is only authorized for the duration of time the  declaration that circumstances exist justifying the authorization of the  emergency use of an in vitro diagnostic tests for detection of SARS-CoV-2  virus and/or diagnosis of COVID-19 infection under section 564(b)(1) of  the Act, 21 U.S.C. 360bbb-3(b)(1), unless the authorization is terminated  or revoked sooner. The test has been validated but independent review by FDA  and CLIA is pending.    Test performed using e[employer] GeneXpert: This RT-PCR assay targets N2,  a region unique to SARS-CoV-2. A conserved region in the E-gene was chosen  for pan-Sarbecovirus detection which includes SARS-CoV-2.    According to CMS-2020-01-R, this platform meets the definition of high-throughput technology.    HS Troponin I 4hr [[id number]]     Lab Status: No result Specimen: Blood     HS Troponin 0hr (reflex protocol) [[id number]]   (Abnormal) Collected: 07/12/24 0750    Lab Status: Final result Specimen: Blood from Arm, Left Updated: 07/12/24 0819     hs TnI 0hr 605 ng/L     CBC and differential [[id number]]  (Abnormal) Collected: 07/12/24 0750    Lab Status: Final result Specimen: Blood from Arm, Left Updated: 07/12/24 0815     WBC 1.94 Thousand/uL      RBC 2.90 Million/uL      Hemoglobin 9.1 g/dL      Hematocrit 30.0 %       fL      MCH 31.4 pg      MCHC 30.3 g/dL      RDW 15.9 %      MPV 13.0 fL      Platelets 56 Thousands/uL      nRBC 0 /100 WBCs      Segmented % 62 %      Immature Grans % 1 %      Lymphocytes % 22 %      Monocytes % 14 %      Eosinophils Relative 1 %      Basophils Relative 0 %      Absolute Neutrophils 1.22 Thousands/µL      Absolute Immature Grans 0.01 Thousand/uL      Absolute Lymphocytes 0.42 Thousands/µL      Absolute Monocytes 0.27 Thousand/µL      Eosinophils Absolute 0.02 Thousand/µL      Basophils Absolute 0.00 Thousands/µL     Comprehensive metabolic panel [[id number]]  (Abnormal) Collected: 07/12/24 0750    Lab Status: Final result Specimen: Blood from Arm, Left Updated: 07/12/24 0812     Sodium 135 mmol/L      Potassium 4.1 mmol/L      Chloride 105 mmol/L      CO2 21 mmol/L      ANION GAP 9 mmol/L      BUN 24 mg/dL      Creatinine 1.22 mg/dL      Glucose 89 mg/dL      Calcium 8.1 mg/dL      AST 33 U/L      ALT 4 U/L      Alkaline Phosphatase 78 U/L      Total Protein 7.2 g/dL      Albumin 3.6 g/dL      Total Bilirubin 0.80 mg/dL      eGFR 40 ml/min/1.73sq m     Narrative:      National Kidney Disease Foundation guidelines for Chronic Kidney Disease (CKD):     Stage 1 with normal or high GFR (GFR > 90 mL/min/1.73 square meters)    Stage 2 Mild CKD (GFR = 60-89 mL/min/1.73 square meters)    Stage 3A Moderate CKD (GFR = 45-59 mL/min/1.73 square meters)    Stage 3B Moderate CKD (GFR = 30-44 mL/min/1.73 square meters)    Stage 4 Severe CKD (GFR = 15-29 mL/min/1.73 square meters)    Stage 5 End Stage CKD (GFR  <15 mL/min/1.73 square meters)  Note: GFR calculation is accurate only with a steady state creatinine    Lipase [[id number]]  (Normal) Collected: 07/12/24 0750    Lab Status: Final result Specimen: Blood from Arm, Left Updated: 07/12/24 0812     Lipase 54 u/L     Magnesium [[id number]]  (Abnormal) Collected: 07/12/24 0750    Lab Status: Final result Specimen: Blood from Arm, Left Updated: 07/12/24 0812     Magnesium 1.7 mg/dL                    CT chest abdomen pelvis wo contrast    (Results Pending)              Procedures  ECG 12 Lead Documentation Only    Date/Time: 7/12/2024 7:44 AM    Performed by: Gilberto Mcmanus DO  Authorized by: Gilberto Mcmanus DO    ECG reviewed by me, the ED Provider: yes    Patient location:  ED  Previous ECG:     Previous ECG:  Compared to current    Similarity:  No change  Interpretation:     Interpretation: abnormal    Rate:     ECG rate assessment: normal    Rhythm:     Rhythm: sinus rhythm    Ectopy:     Ectopy: none    QRS:     QRS axis:  Normal  Conduction:     Conduction: abnormal      Abnormal conduction: complete LBBB    ST segments:     ST segments:  Non-specific  T waves:     T waves: non-specific    Comments:      No change from June 17, 2024  No ischemia           ED Course  ED Course as of 07/12/24 0933   Fri Jul 12, 2024   0840 Preliminary review of the CAT scans indicate the patient has bilateral pleural effusions.   0842 Patient has known pancytopenia for which an outpatient workup with hematology oncology has been recommended by [facility] medicine.   0900 BNP(!): 1,918   0900 hs TnI 0hr(!): 605                                 SBIRT 22yo+      Flowsheet Row Most Recent Value   Initial Alcohol Screen: US AUDIT-C     1. How often do you have a drink containing alcohol? 0 Filed at: 07/12/2024 0732   2. How many drinks containing alcohol do you have on a typical day you are drinking?  0 Filed at: 07/12/2024 0732   3a. Male UNDER 65: How often do you have five or more drinks  on one occasion? 0 Filed at: 07/12/2024 0732   3b. FEMALE Any Age, or MALE 65+: How often do you have 4 or more drinks on one occassion? 0 Filed at: 07/12/2024 0732   Audit-C Score 0 Filed at: 07/12/2024 0732   DIONY: How many times in the past year have you...    Used an illegal drug or used a prescription medication for non-medical reasons? Never Filed at: 07/12/2024 0732                      Medical Decision Making  Patient presented to the emergency department and a MSE was performed. The patient was evaluated for complaint related to acute shortness of breath.  Patient is potentially at risk for, but not limited to, acute coronary syndrome, arrhythmia, myocardial infarction, pulmonary embolism, pneumothorax, infectious process of the lungs such as pneumonia, reactive airway disease, asthma, COPD exacerbation, cardiomyopathy, congestive heart failure or viral syndrome.  Several of these diagnoses have been evaluated and ruled out by history and physical.  As needed, patient will be further evaluated with laboratory and imaging studies.  Higher level diagnostics, such as CT imaging or ultrasound, may also be required.  Please see work-up portion of the note for further evaluation of patient's risk.  Socioeconomic factors were also considered as part of the decision-making process.  Unless otherwise stated in the chart or patient is admitted as elsewhere documented, any previously prescribed medications will be maintained.      Problems Addressed:  Abdominal pain: undiagnosed new problem with uncertain prognosis  Acute on chronic congestive heart failure (HCC): chronic illness or injury with exacerbation, progression, or side effects of treatment that poses a threat to life or bodily functions  Chronic bilateral pleural effusions: chronic illness or injury with exacerbation, progression, or side effects of treatment that poses a threat to life or bodily functions  Dyspnea: undiagnosed new problem with uncertain  prognosis  Enterocolitis: undiagnosed new problem with uncertain prognosis  Pancytopenia (HCC): chronic illness or injury  Type 2 myocardial infarction (HCC): acute illness or injury    Amount and/or Complexity of Data Reviewed  Independent Historian: caregiver     Details: From son  External Data Reviewed: labs and notes.  Labs: ordered. Decision-making details documented in ED Course.  Radiology: ordered. Decision-making details documented in ED Course.  ECG/medicine tests: ordered and independent interpretation performed. Decision-making details documented in ED Course.     Details: Coordinated care with radiology and hospital medicine  Discussion of management or test interpretation with external provider(s): Patient presented to the emergency department and a MSE was performed. The patient was evaluated and diagnosed with acute exacerbation of congestive heart failure.This is an acute exacerbation of a chronic disease process that will require additional planned work-up and treatment in a hospitalized setting. As may have been required as part of this evaluation, clinical laboratory test, radiology imaging and medical testing (I.e. EKG) were ordered as necessitated by the patient's presentation. I independently reviewed these studies, imaging and testing. This patient's case is considered to be a considerable risk secondary to the above listed disease process and poses a threat to the patient's well-being and baseline function. Further in-patient diagnostic testing and management, which may include the administration of parenteral medications, is required.           Risk  Prescription drug management.  Decision regarding hospitalization.                 Disposition  Final diagnoses:   Dyspnea   Acute on chronic congestive heart failure (HCC)   Abdominal pain   Chronic bilateral pleural effusions   Type 2 myocardial infarction (HCC)   Enterocolitis   Pancytopenia (HCC)     Time reflects when diagnosis was  documented in both MDM as applicable and the Disposition within this note       Time User Action Codes Description Comment    7/12/2024  9:01 AM Gilberto Mcmanus [R06.00] Dyspnea     7/12/2024  9:01 AM Gilberto Mcmanus [I50.9] Acute on chronic congestive heart failure (HCC)     7/12/2024  9:01 AM Gilberto Mcmanus Add [R10.9] Abdominal pain     7/12/2024  9:01 AM Gilberto Mcmanus [J90] Chronic bilateral pleural effusions     7/12/2024  9:01 AM Gilberto Mcmanus [I21.A1] Type 2 myocardial infarction (HCC)     7/12/2024  9:32 AM Gilberto Mcmanus [K52.9] Enterocolitis     7/12/2024  9:32 AM Gilberto Mcmanus [D61.818] Pancytopenia (HCC)           ED Disposition       ED Disposition   Admit    Condition   Stable    Date/Time   Fri Jul 12, 2024 0901    Comment                  Follow-up Information    None         Patient's Medications   Discharge Prescriptions    No medications on file       No discharge procedures on file.    PDMP Review       None            ED Provider  Electronically Signed by             Gilberto Mcmanus DO  07/12/24 0969

## 2024-07-13 LAB
ANION GAP SERPL CALCULATED.3IONS-SCNC: 4 MMOL/L (ref 4–13)
BASOPHILS # BLD AUTO: 0 THOUSANDS/ÂΜL (ref 0–0.1)
BASOPHILS NFR BLD AUTO: 0 % (ref 0–1)
BUN SERPL-MCNC: 26 MG/DL (ref 5–25)
CALCIUM SERPL-MCNC: 8 MG/DL (ref 8.4–10.2)
CHLORIDE SERPL-SCNC: 105 MMOL/L (ref 96–108)
CO2 SERPL-SCNC: 29 MMOL/L (ref 21–32)
CREAT SERPL-MCNC: 1.71 MG/DL (ref 0.6–1.3)
EOSINOPHIL # BLD AUTO: 0.03 THOUSAND/ÂΜL (ref 0–0.61)
EOSINOPHIL NFR BLD AUTO: 2 % (ref 0–6)
ERYTHROCYTE [DISTWIDTH] IN BLOOD BY AUTOMATED COUNT: 16.1 % (ref 11.6–15.1)
GFR SERPL CREATININE-BSD FRML MDRD: 26 ML/MIN/1.73SQ M
GLUCOSE SERPL-MCNC: 99 MG/DL (ref 65–140)
HCT VFR BLD AUTO: 28.1 % (ref 34.8–46.1)
HGB BLD-MCNC: 8.6 G/DL (ref 11.5–15.4)
IMM GRANULOCYTES # BLD AUTO: 0.01 THOUSAND/UL (ref 0–0.2)
IMM GRANULOCYTES NFR BLD AUTO: 1 % (ref 0–2)
LYMPHOCYTES # BLD AUTO: 0.48 THOUSANDS/ÂΜL (ref 0.6–4.47)
LYMPHOCYTES NFR BLD AUTO: 26 % (ref 14–44)
MCH RBC QN AUTO: 31.4 PG (ref 26.8–34.3)
MCHC RBC AUTO-ENTMCNC: 30.6 G/DL (ref 31.4–37.4)
MCV RBC AUTO: 103 FL (ref 82–98)
MONOCYTES # BLD AUTO: 0.37 THOUSAND/ÂΜL (ref 0.17–1.22)
MONOCYTES NFR BLD AUTO: 20 % (ref 4–12)
NEUTROPHILS # BLD AUTO: 0.98 THOUSANDS/ÂΜL (ref 1.85–7.62)
NEUTS SEG NFR BLD AUTO: 51 % (ref 43–75)
NRBC BLD AUTO-RTO: 0 /100 WBCS
PLATELET # BLD AUTO: 55 THOUSANDS/UL (ref 149–390)
PMV BLD AUTO: 12.7 FL (ref 8.9–12.7)
POTASSIUM SERPL-SCNC: 5 MMOL/L (ref 3.5–5.3)
RBC # BLD AUTO: 2.74 MILLION/UL (ref 3.81–5.12)
SODIUM SERPL-SCNC: 138 MMOL/L (ref 135–147)
WBC # BLD AUTO: 1.87 THOUSAND/UL (ref 4.31–10.16)

## 2024-07-13 PROCEDURE — 97166 OT EVAL MOD COMPLEX 45 MIN: CPT

## 2024-07-13 PROCEDURE — 85025 COMPLETE CBC W/AUTO DIFF WBC: CPT | Performed by: FAMILY MEDICINE

## 2024-07-13 PROCEDURE — 80048 BASIC METABOLIC PNL TOTAL CA: CPT | Performed by: FAMILY MEDICINE

## 2024-07-13 PROCEDURE — 99232 SBSQ HOSP IP/OBS MODERATE 35: CPT | Performed by: FAMILY MEDICINE

## 2024-07-13 RX ORDER — METOPROLOL SUCCINATE 25 MG/1
25 TABLET, EXTENDED RELEASE ORAL 2 TIMES DAILY
Status: DISCONTINUED | OUTPATIENT
Start: 2024-07-13 | End: 2024-07-15

## 2024-07-13 RX ORDER — FUROSEMIDE 10 MG/ML
40 INJECTION INTRAMUSCULAR; INTRAVENOUS
Status: DISCONTINUED | OUTPATIENT
Start: 2024-07-13 | End: 2024-07-14

## 2024-07-13 RX ADMIN — ATORVASTATIN CALCIUM 40 MG: 40 TABLET, FILM COATED ORAL at 08:48

## 2024-07-13 RX ADMIN — PANTOPRAZOLE SODIUM 40 MG: 40 TABLET, DELAYED RELEASE ORAL at 08:48

## 2024-07-13 RX ADMIN — CARBIDOPA AND LEVODOPA 1 TABLET: 25; 100 TABLET ORAL at 08:47

## 2024-07-13 RX ADMIN — METOPROLOL SUCCINATE 25 MG: 25 TABLET, EXTENDED RELEASE ORAL at 17:54

## 2024-07-13 RX ADMIN — RIVASTIGMINE TARTRATE 1.5 MG: 1.5 CAPSULE ORAL at 16:53

## 2024-07-13 RX ADMIN — METOPROLOL SUCCINATE 50 MG: 50 TABLET, EXTENDED RELEASE ORAL at 08:47

## 2024-07-13 RX ADMIN — CARBIDOPA AND LEVODOPA 1 TABLET: 25; 100 TABLET ORAL at 21:03

## 2024-07-13 RX ADMIN — CARBIDOPA AND LEVODOPA 1 TABLET: 25; 100 TABLET ORAL at 16:52

## 2024-07-13 RX ADMIN — FUROSEMIDE 40 MG: 10 INJECTION, SOLUTION INTRAMUSCULAR; INTRAVENOUS at 08:49

## 2024-07-13 RX ADMIN — LEVOTHYROXINE SODIUM 88 MCG: 88 TABLET ORAL at 05:05

## 2024-07-13 RX ADMIN — PANTOPRAZOLE SODIUM 40 MG: 40 TABLET, DELAYED RELEASE ORAL at 16:53

## 2024-07-13 RX ADMIN — ASPIRIN 81 MG: 81 TABLET, COATED ORAL at 08:47

## 2024-07-13 RX ADMIN — RIVASTIGMINE TARTRATE 1.5 MG: 1.5 CAPSULE ORAL at 08:48

## 2024-07-13 RX ADMIN — QUETIAPINE FUMARATE 50 MG: 25 TABLET ORAL at 21:03

## 2024-07-13 RX ADMIN — FUROSEMIDE 40 MG: 10 INJECTION, SOLUTION INTRAMUSCULAR; INTRAVENOUS at 16:50

## 2024-07-13 NOTE — PLAN OF CARE
Problem: OCCUPATIONAL THERAPY ADULT  Goal: Performs self-care activities at highest level of function for planned discharge setting.  See evaluation for individualized goals.  Description: Treatment Interventions: ADL retraining, Visual perceptual retraining, UE strengthening/ROM, Functional transfer training, Endurance training, Cognitive reorientation, Patient/family training, Equipment evaluation/education, Fine motor coordination activities, Compensatory technique education, Neuromuscular reeducation, Continued evaluation, UE splinting, Cardiac education, Energy conservation, Activityengagement          See flowsheet documentation for full assessment, interventions and recommendations.   Note: Limitation: Decreased endurance, Decreased high-level ADLs  Prognosis: Good  Assessment: Pt is a 88 y.o. female, admitted to [facility]12/[facility]24 d/t experiencing increased SOB. Dx: acute on chronic combined systolic and diastolic congestive heart failure. Pt with PMHx impacting their performance during ADL tasks, including: A-Fib, CHF, COPD, CAD, disease of thyroid gland, hypertension, MI, pancytopenia, renal disorder and stroke. Prior to admission to the hospital Pt was performing ADLs without physical assistance. IADLs with physical assistance. Functional transfers/ambulation without physical assistance. Cognitive status was PTA was Intact. OT order placed to assess Pt's ADLs, cognitive status, and performance during functional tasks in order to maximize safety and independence while making most appropriate d/c recommendations. Pt's clinical presentation is currently evolving given new onset deficits that effect Pt's occupational performance and ability to safely return to PLOF including decrease activity tolerance, decrease standing balance, decrease performance during ADL tasks, decrease generalized strength, and decrease performance during functional transfers combined with medical complications of abnormal renal lab  values, abnormal H&H, abnormal CBC, new onset O2 use, and need for input for mobility technique/safety. Pt maintained Good O2 sats on 2lpm throughout. Personal factors affecting Pt at time of initial evaluation include: step(s) to enter environment, multi-level environment, advanced age, and past experience. Pt will benefit from continued skilled acute OT services to address deficits as defined above and to maximize level independence/participation during ADLs and functional tasks to facilitate return toward PLOF and improved quality of life. Anticipate no OT needs upon discharge home with daughter.     Rehab Resource Intensity Level, OT: No post-acute rehabilitation needs

## 2024-07-13 NOTE — PLAN OF CARE
Problem: PAIN - ADULT  Goal: Verbalizes/displays adequate comfort level or baseline comfort level  Description: Interventions:  - Encourage patient to monitor pain and request assistance  - Assess pain using appropriate pain scale  - Administer analgesics based on type and severity of pain and evaluate response  - Implement non-pharmacological measures as appropriate and evaluate response  - Consider cultural and social influences on pain and pain management  - Notify physician/advanced practitioner if interventions unsuccessful or patient reports new pain  Outcome: Progressing     Problem: SAFETY ADULT  Goal: Patient will remain free of falls  Description: INTERVENTIONS:  - Educate patient/family on patient safety including physical limitations  - Instruct patient to call for assistance with activity   - Consult OT/PT to assist with strengthening/mobility   - Keep Call bell within reach  - Keep bed low and locked with side rails adjusted as appropriate  - Keep care items and personal belongings within reach  - Initiate and maintain comfort rounds  - Make Fall Risk Sign visible to staff  - Offer Toileting every 3 Hours, in advance of need  - Initiate/Maintain bed and chair alarm  - Obtain necessary fall risk management equipment: yellow socks  - Apply yellow socks and bracelet for high fall risk patients  - Consider moving patient to room near nurses station  Outcome: Progressing  Goal: Maintain or return to baseline ADL function  Description: INTERVENTIONS:  -  Assess patient's ability to carry out ADLs; assess patient's baseline for ADL function and identify physical deficits which impact ability to perform ADLs (bathing, care of mouth/teeth, toileting, grooming, dressing, etc.)  - Assess/evaluate cause of self-care deficits   - Assess range of motion  - Assess patient's mobility; develop plan if impaired  - Assess patient's need for assistive devices and provide as appropriate  - Encourage maximum independence  but intervene and supervise when necessary  - Involve family in performance of ADLs  - Assess for home care needs following discharge   - Consider OT consult to assist with ADL evaluation and planning for discharge  - Provide patient education as appropriate  Outcome: Progressing  Goal: Maintains/Returns to pre admission functional level  Description: INTERVENTIONS:  - Perform AM-PAC 6 Click Basic Mobility/ Daily Activity assessment daily.  - Set and communicate daily mobility goal to care team and patient/family/caregiver.   - Collaborate with rehabilitation services on mobility goals if consulted  - Perform Range of Motion 3 times a day.  - Stand patient 3 times a day  - Ambulate patient 3 times a day  - Out of bed to chair 3 times a day   - Out of bed for meals 3 times a day  - Out of bed for toileting  - Record patient progress and toleration of activity level   Outcome: Progressing

## 2024-07-13 NOTE — OCCUPATIONAL THERAPY NOTE
"    Occupational Therapy Evaluation     Patient Name: Radha Engel  Today's Date: 7/13/2024  Problem List  Principal Problem:    Acute on chronic combined systolic and diastolic congestive heart failure (HCC)  Active Problems:    Chronic obstructive pulmonary disease with acute exacerbation (HCC)    Chronic respiratory failure with hypoxia (HCC)    Pancytopenia (HCC)    Paroxysmal atrial fibrillation (HCC)    Parkinsons    CREST syndrome (HCC)    Past Medical History  Past Medical History:   Diagnosis Date    A-fib (HCC)     CHF (congestive heart failure) (HCC)     COPD (chronic obstructive pulmonary disease) (HCC)     Coronary artery disease     Disease of thyroid gland     Hypertension     MI, old     Pancytopenia (HCC)     Renal disorder     Stroke (HCC)      Past Surgical History  Past Surgical History:   Procedure Laterality Date    CARDIAC TAVR        07/13/24 0981   Note Type   Note type Evaluation   Pain Assessment   Pain Assessment Tool 0-10   Pain Score No Pain   Restrictions/Precautions   Other Precautions Chair Alarm;Bed Alarm;O2;Fall Risk  (2lpm; chronic 2lpm at night)   Home Living   Type of Home House  ([facility])   Home Layout Two level   Bathroom Shower/Tub Walk-in shower   Bathroom Toilet Raised   Bathroom Equipment Shower chair;Grab bars in shower   Home Equipment Walker;Cane;Wheelchair-manual   Additional Comments Pt reports living in a bi-level home with her daughter. RW use at baseline.   Prior Function   Level of [place] Independent with ADLs;Independent with functional mobility;Needs assistance with IADLS   Lives With Daughter   Receives Help From Family   IADLs Family/Friend/Other provides transportation;Family/Friend/Other provides meals;Family/Friend/Other provides medication management   Falls in the last 6 months 0   Subjective   Subjective \"I get short of breath so easily\"   ADL   UB Dressing Assistance 5  Supervision/Setup   UB Dressing Deficit Setup;Verbal cueing;Increased time " to complete   LB Dressing Assistance 5  Supervision/Setup   LB Dressing Deficit Setup;Requires assistive device for steadying;Verbal cueing;Increased time to complete   Toileting Assistance  5  Supervision/Setup   Toileting Deficit Setup;Verbal cueing;Increased time to complete   Additional Comments UB ADLs @ S/set-up while seated at EOB. LB ADLs @ S. Pt able to don socks while seated at EOB. Pericare and clothing management while standing at toilet @ S.   Bed Mobility   Sit to Supine 6  Modified independent   Additional items Increased time required   Additional Comments At end of session, sit to supine @ Mod I with HOB slightly elevated.   Transfers   Sit to Stand 5  Supervision   Additional items Verbal cues   Stand to Sit 5  Supervision   Additional items Verbal cues   Stand pivot 5  Supervision   Additional items Verbal cues   Toilet transfer 5  Supervision   Additional items Verbal cues   Additional Comments RW utilized   Functional Mobility   Additional Comments Pt able to complete short distance functional mobility to/from bathroom with RW @ S.   Balance   Static Sitting Normal   Dynamic Sitting Good   Static Standing Fair +   Dynamic Standing Fair   Activity Tolerance   Activity Tolerance Patient tolerated treatment well;Patient limited by fatigue   Nurse Made Aware Spoke with PCA Shamika   RUE Assessment   RUE Assessment WFL   LUE Assessment   LUE Assessment WFL   Hand Function   Gross Motor Coordination Functional   Fine Motor Coordination Functional   Cognition   Overall Cognitive Status WFL   Arousal/Participation Alert;Responsive;Cooperative   Attention Within functional limits   Orientation Level Oriented X4   Memory Within functional limits   Following Commands Follows all commands and directions without difficulty   Assessment   Limitation Decreased endurance;Decreased high-level ADLs   Prognosis Good   Assessment Pt is a 88 y.o. female, admitted to [facility] 7/12/2024 d/t experiencing increased SOB. Dx:  acute on chronic combined systolic and diastolic congestive heart failure. Pt with PMHx impacting their performance during ADL tasks, including: A-Fib, CHF, COPD, CAD, disease of thyroid gland, hypertension, MI, pancytopenia, renal disorder and stroke. Prior to admission to the hospital Pt was performing ADLs without physical assistance. IADLs with physical assistance. Functional transfers/ambulation without physical assistance. Cognitive status was PTA was Intact. OT order placed to assess Pt's ADLs, cognitive status, and performance during functional tasks in order to maximize safety and independence while making most appropriate d/c recommendations. Pt's clinical presentation is currently evolving given new onset deficits that effect Pt's occupational performance and ability to safely return to PLOF including decrease activity tolerance, decrease standing balance, decrease performance during ADL tasks, decrease generalized strength, and decrease performance during functional transfers combined with medical complications of abnormal renal lab values, abnormal H&H, abnormal CBC, new onset O2 use, and need for input for mobility technique/safety. Pt maintained Good O2 sats on 2lpm throughout. Personal factors affecting Pt at time of initial evaluation include: step(s) to enter environment, multi-level environment, advanced age, and past experience. Pt will benefit from continued skilled acute OT services to address deficits as defined above and to maximize level independence/participation during ADLs and functional tasks to facilitate return toward PLOF and improved quality of life. Anticipate no OT needs upon discharge home with daughter.   Plan   Treatment Interventions ADL retraining;Visual perceptual retraining;UE strengthening/ROM;Functional transfer training;Endurance training;Cognitive reorientation;Patient/family training;Equipment evaluation/education;Fine motor coordination activities;Compensatory  technique education;Neuromuscular reeducation;Continued evaluation;UE splinting;Cardiac education;Energy conservation;Activityengagement   Goal Expiration Date 07/27/24   OT Frequency 1-2x/wk   Discharge Recommendation   Rehab Resource Intensity Level, OT No post-acute rehabilitation needs   AM-PAC Daily Activity Inpatient   Lower Body Dressing 3   Bathing 3   Toileting 3   Upper Body Dressing 4   Grooming 3   Eating 4   Daily Activity Raw Score 20   Daily Activity Standardized Score (Calc for Raw Score >=11) 42.03   AM-[facility] Applied Cognition Inpatient   Following a Speech/Presentation 3   Understanding Ordinary Conversation 4   Taking Medications 3   Remembering Where Things Are Placed or Put Away 4   Remembering List of 4-5 Errands 3   Taking Care of Complicated Tasks 3   Applied Cognition Raw Score 20   Applied Cognition Standardized Score 41.76     The patient's raw score on the AM-PAC Daily Activity Inpatient Short Form is 20. A raw score of greater than or equal to 19 suggests the patient may benefit from discharge to home. Please refer to the recommendation of the Occupational Therapist for safe discharge planning.    Pt goals to be met by 7/27/2024    Pt will demonstrate ability to complete grooming/hygiene tasks @ Mod I after set-up.  Pt will demonstrate ability to complete supine<>sit @ Mod I in order to increase safety and independence during ADL tasks.  Pt will demonstrate ability to complete UB ADLs including washing/dressing @ Mod I in order to increase performance and participation during meaningful tasks  Pt will demonstrate ability to complete LB dressing @ Mod I in order to increase safety and independence during meaningful tasks.   Pt will demonstrate ability to viry/doff socks/shoes while sitting EOB @ Mod I in order to increase safety and independence during meaningful tasks.   Pt will demonstrate ability to complete toileting tasks including CM and pericare @ Mod I in order to increase safety  and independence during meaningful tasks.  Pt will demonstrate ability to complete EOB, chair, toilet/commode transfers @ Mod I in order to increase performance and participation during functional tasks.  Pt will demonstrate ability to stand for 5-8 minutes while maintaining Fair + balance with use of RW for UB support PRN.  Pt will demonstrate ability to tolerate 30-35 minute OT session with no vc'ing for deep breathing or use of energy conservation techniques in order to increase activity tolerance during functional tasks.   Pt will demonstrate Good carryover of use of energy conservation/compensatory strategies during ADLs and functional tasks in order to increase safety and reduce risk for falls.   Pt will demonstrate Good attention and participation in continued evaluation of functional ambulation house hold distances in order to assist with safe d/c planning.  Pt will attend to continued cognitive assessments 100% of the time in order to provide most appropriate d/c recommendations.   Pt will follow 100% simple 2-step commands and be A&O x4 consistently with environmental cues to increase participation in functional activities.   Pt will identify 3 areas of interest/hobbies and 1 intervention on how to incorporate into daily life in order to increase interaction with environment and peers as well as increase participation in meaningful tasks.   Pt will demonstrate 100% carryover of BUE HEP in order to increase BUE MS and increase performance during functional tasks upon d/c home.    Lisette Gray, OTR/L

## 2024-07-13 NOTE — PROGRESS NOTES
[facility]  Progress Note  Name: Radha Engel I  MRN: [id number]  Unit/Bed#: -01 I Date of Admission: 7/12/2024   Date of Service: 7/13/2024 I Hospital Day: 1    Assessment & Plan   * Acute on chronic combined systolic and diastolic congestive heart failure (HCC)  Assessment & Plan  Wt Readings from Last 3 Encounters:   07/13/24 50.3 kg (110 lb 14.3 oz)   06/21/24 50.9 kg (112 lb 3.2 oz)   The echo from 04/24 shows EF of 30 to 35%.  Patient was taking oral Lasix at home however got congested again.  ct chest shows New bilateral pleural effusions. Areas of patchy groundglass opacity could represent mild edema or atypical pneumonitis.   Will place on Lasix 40 mg IV twice daily and assess response.  Monitor Renal function and blood pressure closely  creatinine bumped up to 1.7 however patient appears to be more euvolemic today.  Hold Lasix today and recheck creatinine tomorrow and reassess diuretic use            Parkinsons  Assessment & Plan  Continue Sinemet and rivastigmine      Paroxysmal atrial fibrillation (HCC)  Assessment & Plan  Currently rate controlled continue beta-blockers no anticoagulation due to pancytopenia    Pancytopenia (HCC)  Assessment & Plan  Chronic issue noted in the past we will discuss with her further.  Avoid DVT prophylaxis    Chronic respiratory failure with hypoxia (HCC)  Assessment & Plan  2 L of oxygen continuously at baseline    Chronic obstructive pulmonary disease with acute exacerbation (HCC)  Assessment & Plan  Seems to also have mild COPD flare will place on Neb treatments and observe response.  On 2 L of oxygen continuously at baseline.  Currently at her baseline               VTE Pharmacologic Prophylaxis: VTE Score: 3 Moderate Risk (Score 3-4) - Pharmacological DVT Prophylaxis Contraindicated. Sequential Compression Devices Ordered.    Mobility:   Basic Mobility Inpatient Raw Score: 20  JH-HLM Goal: 6: Walk 10 steps or more  JH-HLM  Achieved: 6: Walk 10 steps or more  -HLM Goal achieved. Continue to encourage appropriate mobility.    Patient Centered Rounds: I performed bedside rounds with nursing staff today.   Discussions with Specialists or Other Care Team Provider: none    Education and Discussions with Family / Patient: will update    Total Time Spent on Date of Encounter in care of patient: 35 mins. This time was spent on one or more of the following: performing physical exam; counseling and coordination of care; obtaining or reviewing history; documenting in the medical record; reviewing/ordering tests, medications or procedures; communicating with other healthcare professionals and discussing with patient's family/caregivers.    Current Length of Stay: 1 day(s)  Current Patient Status: Inpatient   Certification Statement: The patient will continue to require additional inpatient hospital stay due to syncope  Discharge Plan: Anticipate discharge in 24-48 hrs to discharge location to be determined pending rehab evaluations.    Code Status: Level 3 - DNAR and DNI    Subjective:   Patient currently denies any chest pain or shortness of breath or abdominal pain or nausea or vomiting.  Feeling better.    Objective:     Vitals:   Temp (24hrs), Av.3 °F (36.3 °C), Min:96.8 °F (36 °C), Max:97.7 °F (36.5 °C)    Temp:  [96.8 °F (36 °C)-97.7 °F (36.5 °C)] 97.2 °F (36.2 °C)  HR:  [] 81  Resp:  [16-19] 19  BP: ()/(49-70) 112/54  SpO2:  [77 %-99 %] 98 %  Body mass index is 20.95 kg/m².     Input and Output Summary (last 24 hours):     Intake/Output Summary (Last 24 hours) at 2024 1217  Last data filed at 2024 1148  Gross per 24 hour   Intake 360 ml   Output 400 ml   Net -40 ml       Physical Exam:   Physical Exam  Vitals and nursing note reviewed.   Constitutional:       Appearance: Normal appearance.   HENT:      Head: Normocephalic and atraumatic.      Right Ear: External ear normal.      Left Ear: External ear normal.       Nose: Nose normal.      Mouth/Throat:      Pharynx: Oropharynx is clear.   Cardiovascular:      Rate and Rhythm: Normal rate and regular rhythm.      Heart sounds: Normal heart sounds.   Pulmonary:      Effort: Pulmonary effort is normal.      Breath sounds: Normal breath sounds.   Abdominal:      General: Bowel sounds are normal.      Palpations: Abdomen is soft.      Tenderness: There is no abdominal tenderness.   Musculoskeletal:         General: Normal range of motion.      Cervical back: Normal range of motion and neck supple.   Skin:     General: Skin is warm and dry.      Capillary Refill: Capillary refill takes less than 2 seconds.   Neurological:      Mental Status: She is alert. Mental status is at baseline.   Psychiatric:         Mood and Affect: Mood normal.            Additional Data:     Labs:  Results from last 7 days   Lab Units 07/13/24  0441   WBC Thousand/uL 1.87*   HEMOGLOBIN g/dL 8.6*   HEMATOCRIT % 28.1*   PLATELETS Thousands/uL 55*   SEGS PCT % 51   LYMPHO PCT % 26   MONO PCT % 20*   EOS PCT % 2     Results from last 7 days   Lab Units 07/13/24  0441 07/12/24  0750   SODIUM mmol/L 138 135   POTASSIUM mmol/L 5.0 4.1   CHLORIDE mmol/L 105 105   CO2 mmol/L 29 21   BUN mg/dL 26* 24   CREATININE mg/dL 1.71* 1.22   ANION GAP mmol/L 4 9   CALCIUM mg/dL 8.0* 8.1*   ALBUMIN g/dL  --  3.6   TOTAL BILIRUBIN mg/dL  --  0.80   ALK PHOS U/L  --  78   ALT U/L  --  4*   AST U/L  --  33   GLUCOSE RANDOM mg/dL 99 89                       Lines/Drains:  Invasive Devices       Peripheral Intravenous Line  Duration             Peripheral IV 07/13/24 Left Antecubital <1 day                      Telemetry:  Telemetry Orders (From admission, onward)               24 Hour Telemetry Monitoring  Continuous x 24 Hours (Telem)        Question:  Reason for 24 Hour Telemetry  Answer:  Syncope suspected to be cardiac in origin                     Telemetry Reviewed: Normal Sinus Rhythm  Indication for Continued Telemetry  Use: Arrthymias requiring medical therapy             Imaging: Reviewed radiology reports from this admission including: chest CT scan and abdominal/pelvic CT    Recent Cultures (last 7 days):         Last 24 Hours Medication List:   Current Facility-Administered Medications   Medication Dose Route Frequency Provider Last Rate    acetaminophen  650 mg Oral Q6H PRN Debora Gomez MD      albuterol  2 puff Inhalation Q6H PRN Debora Gomez MD      aspirin  81 mg Oral Daily Debora Gomez MD      atorvastatin  40 mg Oral Daily Debora Gomez MD      carbidopa-levodopa  1 tablet Oral TID Debora Gomez MD      levothyroxine  88 mcg Oral Daily Debora Gomez MD      metoprolol succinate  25 mg Oral BID Debora Gomez MD      pantoprazole  40 mg Oral BID Debora Gomez MD      QUEtiapine  50 mg Oral HS Debora Gomez MD      rivastigmine  1.5 mg Oral BID Debora Gomez MD          Today, Patient Was Seen By: Debora Gomez MD    **Please Note: This note may have been constructed using a voice recognition system.**

## 2024-07-13 NOTE — Clinical Note
Pt is a 88 y.o. female, admitted to [facility] 7/12/2024 d/t experiencing increased SOB. Dx: acute on chronic combined systolic and diastolic congestive heart failure. Pt with PMHx impacting their performance during ADL tasks, including: A-Fib, CHF, COPD, CAD, disease of thyroid gland, hypertension, MI, pancytopenia, renal disorder and stroke. Prior to admission to the hospital Pt was performing ADLs {NCwithwithout:27271} physical assistance. IADLs {Ncwithwithout:96387} physical assistance. Functional transfers/ambulation {Ncwithwithout:79335} physical assistance. Cognitive status was PTA was ***. OT order placed to assess Pt's ADLs, cognitive status, and performance during functional tasks in order to maximize safety and independence while making most appropriate d/c recommendations. PT/OT co-evaluation completed at this time d/t significant*** mobility deficits and safety concerns. Pt's clinical presentation is currently {MSLstable:12125} given new onset deficits that effect Pt's occupational performance and ability to safely return to PLOF including {NCOTdeficits:52747} combined with medical complications of {NCMEDICALCOMPLICATIONS:28961}. ***O2 needs. Personal factors affecting Pt at time of initial evaluation include: {NCPERSONALFACTORS:23069}. Pt will benefit from continued skilled OT services to address deficits as defined above and to maximize level independence/participation during ADLs and functional tasks to facilitate return toward PLOF and improved quality of life. From an occupational therapy standpoint, recommendation at time of d/c would be ***.

## 2024-07-13 NOTE — CASE MANAGEMENT
Case Management Discharge Planning Note    Patient name Radha Engel  Location /-01 MRN [id number]  : 1935 Date 2024       Current Admission Date: 2024  Current Admission Diagnosis:Acute on chronic combined systolic and diastolic congestive heart failure (HCC)   Patient Active Problem List    Diagnosis Date Noted Date Diagnosed    CLINT (acute kidney injury) (HCC) 2024     Acute on chronic combined systolic and diastolic congestive heart failure (HCC) 2024     Chronic obstructive pulmonary disease with acute exacerbation (HCC) 2024     Chronic respiratory failure with hypoxia (HCC) 2024     Pancytopenia (HCC) 2024     Paroxysmal atrial fibrillation (HCC) 2024     Parkinsons 2024     CREST syndrome ([facility]) 2024     CAD (coronary artery disease) 2024       LOS (days): 1  Geometric Mean LOS (GMLOS) (days): 3.9  Days to GMLOS:2.7     OBJECTIVE:  Risk of Unplanned Readmission Score: 19.84         Current admission status: Inpatient   Preferred Pharmacy:   [id number] [address] [address]  Phone: [phone] Fax: [phone]    Primary Care Provider: Dayana Glaser PA-C    Primary Insurance: MEDICARE  Secondary Insurance: BLUE CROSS    DISCHARGE DETAILS:     CM received returned call from Chiquis - patient's daughter and reviewed patient's preferences and therapy recommendations. PT still to see but no OT needs at discharge. She feels patient would need services despite saying she does not.   She asked if there were any rights she had when patient was discharged to appeal if she felt she was not ready to go home. CM reviewed medicare rights and discussed this was the form signed upon her admission yesterday by her brother. She is aware and thankful for information.    CM to follow patient's care and discharge needs.

## 2024-07-13 NOTE — ASSESSMENT & PLAN NOTE
Seems to also have mild COPD flare will place on Neb treatments and observe response.  On 2 L of oxygen continuously at baseline.  Currently at her baseline

## 2024-07-13 NOTE — ASSESSMENT & PLAN NOTE
Wt Readings from Last 3 Encounters:   07/13/24 50.3 kg (110 lb 14.3 oz)   06/21/24 50.9 kg (112 lb 3.2 oz)   The echo from 04/24 shows EF of 30 to 35%.  Patient was taking oral Lasix at home however got congested again.  ct chest shows New bilateral pleural effusions. Areas of patchy groundglass opacity could represent mild edema or atypical pneumonitis.   Will place on Lasix 40 mg IV twice daily and assess response.  Monitor Renal function and blood pressure closely  creatinine bumped up to 1.7 however patient appears to be more euvolemic today.  Hold Lasix today and recheck creatinine tomorrow and reassess diuretic use

## 2024-07-14 LAB
ANION GAP SERPL CALCULATED.3IONS-SCNC: 7 MMOL/L (ref 4–13)
BUN SERPL-MCNC: 32 MG/DL (ref 5–25)
CALCIUM SERPL-MCNC: 8.5 MG/DL (ref 8.4–10.2)
CHLORIDE SERPL-SCNC: 102 MMOL/L (ref 96–108)
CO2 SERPL-SCNC: 29 MMOL/L (ref 21–32)
CREAT SERPL-MCNC: 1.49 MG/DL (ref 0.6–1.3)
GFR SERPL CREATININE-BSD FRML MDRD: 31 ML/MIN/1.73SQ M
GLUCOSE SERPL-MCNC: 92 MG/DL (ref 65–140)
POTASSIUM SERPL-SCNC: 4.1 MMOL/L (ref 3.5–5.3)
SODIUM SERPL-SCNC: 138 MMOL/L (ref 135–147)

## 2024-07-14 PROCEDURE — 99232 SBSQ HOSP IP/OBS MODERATE 35: CPT | Performed by: FAMILY MEDICINE

## 2024-07-14 PROCEDURE — 80048 BASIC METABOLIC PNL TOTAL CA: CPT | Performed by: FAMILY MEDICINE

## 2024-07-14 RX ORDER — FUROSEMIDE 10 MG/ML
40 INJECTION INTRAMUSCULAR; INTRAVENOUS
Status: COMPLETED | OUTPATIENT
Start: 2024-07-14 | End: 2024-07-14

## 2024-07-14 RX ADMIN — RIVASTIGMINE TARTRATE 1.5 MG: 1.5 CAPSULE ORAL at 08:45

## 2024-07-14 RX ADMIN — METOPROLOL SUCCINATE 25 MG: 25 TABLET, EXTENDED RELEASE ORAL at 17:00

## 2024-07-14 RX ADMIN — METOPROLOL SUCCINATE 25 MG: 25 TABLET, EXTENDED RELEASE ORAL at 08:45

## 2024-07-14 RX ADMIN — CARBIDOPA AND LEVODOPA 1 TABLET: 25; 100 TABLET ORAL at 08:46

## 2024-07-14 RX ADMIN — FUROSEMIDE 40 MG: 10 INJECTION, SOLUTION INTRAMUSCULAR; INTRAVENOUS at 17:02

## 2024-07-14 RX ADMIN — QUETIAPINE FUMARATE 50 MG: 25 TABLET ORAL at 21:02

## 2024-07-14 RX ADMIN — PANTOPRAZOLE SODIUM 40 MG: 40 TABLET, DELAYED RELEASE ORAL at 17:00

## 2024-07-14 RX ADMIN — FUROSEMIDE 40 MG: 10 INJECTION, SOLUTION INTRAMUSCULAR; INTRAVENOUS at 08:51

## 2024-07-14 RX ADMIN — ASPIRIN 81 MG: 81 TABLET, COATED ORAL at 08:45

## 2024-07-14 RX ADMIN — CARBIDOPA AND LEVODOPA 1 TABLET: 25; 100 TABLET ORAL at 16:59

## 2024-07-14 RX ADMIN — RIVASTIGMINE TARTRATE 1.5 MG: 1.5 CAPSULE ORAL at 16:59

## 2024-07-14 RX ADMIN — CARBIDOPA AND LEVODOPA 1 TABLET: 25; 100 TABLET ORAL at 21:02

## 2024-07-14 RX ADMIN — ATORVASTATIN CALCIUM 40 MG: 40 TABLET, FILM COATED ORAL at 08:45

## 2024-07-14 RX ADMIN — LEVOTHYROXINE SODIUM 88 MCG: 88 TABLET ORAL at 06:22

## 2024-07-14 RX ADMIN — PANTOPRAZOLE SODIUM 40 MG: 40 TABLET, DELAYED RELEASE ORAL at 08:46

## 2024-07-14 NOTE — PLAN OF CARE
Problem: PAIN - ADULT  Goal: Verbalizes/displays adequate comfort level or baseline comfort level  Description: Interventions:  - Encourage patient to monitor pain and request assistance  - Assess pain using appropriate pain scale  - Administer analgesics based on type and severity of pain and evaluate response  - Implement non-pharmacological measures as appropriate and evaluate response  - Consider cultural and social influences on pain and pain management  - Notify physician/advanced practitioner if interventions unsuccessful or patient reports new pain  Outcome: Progressing     Problem: SAFETY ADULT  Goal: Patient will remain free of falls  Description: INTERVENTIONS:  - Educate patient/family on patient safety including physical limitations  - Instruct patient to call for assistance with activity   - Consult OT/PT to assist with strengthening/mobility   - Keep Call bell within reach  - Keep bed low and locked with side rails adjusted as appropriate  - Keep care items and personal belongings within reach  - Initiate and maintain comfort rounds  - Make Fall Risk Sign visible to staff  - Offer Toileting every 2 Hours, in advance of need  - Initiate/Maintain bed and chair alarm  - Obtain necessary fall risk management equipment:   - Apply yellow socks and bracelet for high fall risk patients  - Consider moving patient to room near nurses station  Outcome: Progressing     Problem: SAFETY ADULT  Goal: Maintain or return to baseline ADL function  Description: INTERVENTIONS:  -  Assess patient's ability to carry out ADLs; assess patient's baseline for ADL function and identify physical deficits which impact ability to perform ADLs (bathing, care of mouth/teeth, toileting, grooming, dressing, etc.)  - Assess/evaluate cause of self-care deficits   - Assess range of motion  - Assess patient's mobility; develop plan if impaired  - Assess patient's need for assistive devices and provide as appropriate  - Encourage maximum  independence but intervene and supervise when necessary  - Involve family in performance of ADLs  - Assess for home care needs following discharge   - Consider OT consult to assist with ADL evaluation and planning for discharge  - Provide patient education as appropriate  Outcome: Progressing

## 2024-07-14 NOTE — PROGRESS NOTES
[facility]  Progress Note  Name: Radha Engel I  MRN: [id number]  Unit/Bed#: -01 I Date of Admission: 7/12/2024   Date of Service: 7/14/2024 I Hospital Day: 2    Assessment & Plan   * Acute on chronic combined systolic and diastolic congestive heart failure (HCC)  Assessment & Plan  Wt Readings from Last 3 Encounters:   07/14/24 49.4 kg (108 lb 14.4 oz)   06/21/24 50.9 kg (112 lb 3.2 oz)   2d echo from 04/24 shows EF of 30 to 35%.  Patient was taking oral Lasix at home however got congested again.  ct chest shows New bilateral pleural effusions. Areas of patchy groundglass opacity could represent mild edema or atypical pneumonitis.   Will place on Lasix 40 mg IV twice daily and assess response.  Monitor Renal function and blood pressure closely  creatinine bumped up to 1.7 however continued diuresis and creatinine improved to 1.49.will repeat cxray in am and switch to oral diuretics tomorrow            CREST syndrome (HCC)  Assessment & Plan  Known diagnosis    Parkinsons  Assessment & Plan  Continue Sinemet and rivastigmine      Paroxysmal atrial fibrillation (HCC)  Assessment & Plan  Currently rate controlled continue beta-blockers no anticoagulation due to pancytopenia    Pancytopenia (HCC)  Assessment & Plan  Chronic issue noted in the past we will discuss with her further.  Avoid DVT prophylaxis    Chronic respiratory failure with hypoxia (HCC)  Assessment & Plan  2 L of oxygen continuously at baseline    Chronic obstructive pulmonary disease with acute exacerbation (HCC)  Assessment & Plan  Seems to also have mild COPD flare will place on Neb treatments and observe response.  On 2 L of oxygen continuously at baseline.  Currently at her baseline               VTE Pharmacologic Prophylaxis: VTE Score: 3 Moderate Risk (Score 3-4) - Pharmacological DVT Prophylaxis Contraindicated. Sequential Compression Devices Ordered.    Mobility:   Basic Mobility Inpatient Raw Score:  21  JH-HLM Goal: 6: Walk 10 steps or more  JH-HLM Achieved: 6: Walk 10 steps or more  JH-HLM Goal achieved. Continue to encourage appropriate mobility.    Patient Centered Rounds: I performed bedside rounds with nursing staff today.   Discussions with Specialists or Other Care Team Provider: none    Education and Discussions with Family / Patient: will update    Total Time Spent on Date of Encounter in care of patient: 35 mins. This time was spent on one or more of the following: performing physical exam; counseling and coordination of care; obtaining or reviewing history; documenting in the medical record; reviewing/ordering tests, medications or procedures; communicating with other healthcare professionals and discussing with patient's family/caregivers.    Current Length of Stay: 2 day(s)  Current Patient Status: Inpatient   Certification Statement: The patient will continue to require additional inpatient hospital stay due to syncope  Discharge Plan: Anticipate discharge in 24-48 hrs to discharge location to be determined pending rehab evaluations.    Code Status: Level 3 - DNAR and DNI    Subjective:   Currently denies any chest pain or shortness of breath currently stable at rest feeling better.    Objective:     Vitals:   Temp (24hrs), Av.2 °F (36.2 °C), Min:97 °F (36.1 °C), Max:97.3 °F (36.3 °C)    Temp:  [97 °F (36.1 °C)-97.3 °F (36.3 °C)] 97.2 °F (36.2 °C)  HR:  [] 109  Resp:  [17-18] 18  BP: ()/(52-87) 133/87  SpO2:  [84 %-100 %] 100 %  Body mass index is 20.58 kg/m².     Input and Output Summary (last 24 hours):     Intake/Output Summary (Last 24 hours) at 2024 1239  Last data filed at 2024 1230  Gross per 24 hour   Intake 120 ml   Output 1350 ml   Net -1230 ml       Physical Exam:   Physical Exam  Vitals and nursing note reviewed.   Constitutional:       Appearance: Normal appearance.   HENT:      Head: Normocephalic and atraumatic.      Right Ear: External ear normal.       Left Ear: External ear normal.      Nose: Nose normal.      Mouth/Throat:      Pharynx: Oropharynx is clear.   Eyes:      Pupils: Pupils are equal, round, and reactive to light.   Cardiovascular:      Rate and Rhythm: Normal rate and regular rhythm.      Heart sounds: Normal heart sounds.   Pulmonary:      Effort: Pulmonary effort is normal.      Comments: mod air entry bilaterally with mild bibasilar crackles noted  Abdominal:      General: Bowel sounds are normal.      Palpations: Abdomen is soft.      Tenderness: There is no abdominal tenderness.   Musculoskeletal:         General: Normal range of motion.      Cervical back: Normal range of motion and neck supple.   Skin:     General: Skin is warm and dry.      Capillary Refill: Capillary refill takes less than 2 seconds.   Neurological:      General: No focal deficit present.      Mental Status: She is alert and oriented to person, place, and time.   Psychiatric:         Mood and Affect: Mood normal.            Additional Data:     Labs:  Results from last 7 days   Lab Units 07/13/24  0441   WBC Thousand/uL 1.87*   HEMOGLOBIN g/dL 8.6*   HEMATOCRIT % 28.1*   PLATELETS Thousands/uL 55*   SEGS PCT % 51   LYMPHO PCT % 26   MONO PCT % 20*   EOS PCT % 2     Results from last 7 days   Lab Units 07/14/24  0622 07/13/24  0441 07/12/24  0750   SODIUM mmol/L 138   < > 135   POTASSIUM mmol/L 4.1   < > 4.1   CHLORIDE mmol/L 102   < > 105   CO2 mmol/L 29   < > 21   BUN mg/dL 32*   < > 24   CREATININE mg/dL 1.49*   < > 1.22   ANION GAP mmol/L 7   < > 9   CALCIUM mg/dL 8.5   < > 8.1*   ALBUMIN g/dL  --   --  3.6   TOTAL BILIRUBIN mg/dL  --   --  0.80   ALK PHOS U/L  --   --  78   ALT U/L  --   --  4*   AST U/L  --   --  33   GLUCOSE RANDOM mg/dL 92   < > 89    < > = values in this interval not displayed.                       Lines/Drains:  Invasive Devices       Peripheral Intravenous Line  Duration             Peripheral IV 07/13/24 Left Antecubital 1 day                       Telemetry:  Telemetry Orders (From admission, onward)               24 Hour Telemetry Monitoring  Continuous x 24 Hours (Telem)           Question:  Reason for 24 Hour Telemetry  Answer:  Syncope suspected to be cardiac in origin                     Telemetry Reviewed: Normal Sinus Rhythm  Indication for Continued Telemetry Use: No indication for continued use. Will discontinue.              Imaging: Reviewed radiology reports from this admission including: chest CT scan and abdominal/pelvic CT    Recent Cultures (last 7 days):         Last 24 Hours Medication List:   Current Facility-Administered Medications   Medication Dose Route Frequency Provider Last Rate    acetaminophen  650 mg Oral Q6H PRN Debora Gomez MD      albuterol  2 puff Inhalation Q6H PRN Debora Gomez MD      aspirin  81 mg Oral Daily Debora Gomez MD      atorvastatin  40 mg Oral Daily Debora Gomez MD      carbidopa-levodopa  1 tablet Oral TID Debora Gomez MD      furosemide  40 mg Intravenous BID (diuretic) Debora Gomez MD      levothyroxine  88 mcg Oral Daily Debora Gomez MD      metoprolol succinate  25 mg Oral BID Debora Gomez MD      pantoprazole  40 mg Oral BID Debora Gomez MD      QUEtiapine  50 mg Oral HS Debora Gomez MD      rivastigmine  1.5 mg Oral BID Debora Gomez MD          Today, Patient Was Seen By: Debora Gomez MD    **Please Note: This note may have been constructed using a voice recognition system.**

## 2024-07-14 NOTE — ASSESSMENT & PLAN NOTE
Wt Readings from Last 3 Encounters:   07/14/24 49.4 kg (108 lb 14.4 oz)   06/21/24 50.9 kg (112 lb 3.2 oz)   2d echo from 04/24 shows EF of 30 to 35%.  Patient was taking oral Lasix at home however got congested again.  ct chest shows New bilateral pleural effusions. Areas of patchy groundglass opacity could represent mild edema or atypical pneumonitis.   Will place on Lasix 40 mg IV twice daily and assess response.  Monitor Renal function and blood pressure closely  creatinine bumped up to 1.7 however continued diuresis and creatinine improved to 1.49.will repeat cxray in am and switch to oral diuretics tomorrow

## 2024-07-14 NOTE — PLAN OF CARE
Problem: PAIN - ADULT  Goal: Verbalizes/displays adequate comfort level or baseline comfort level  Description: Interventions:  - Encourage patient to monitor pain and request assistance  - Assess pain using appropriate pain scale  - Administer analgesics based on type and severity of pain and evaluate response  - Implement non-pharmacological measures as appropriate and evaluate response  - Consider cultural and social influences on pain and pain management  - Notify physician/advanced practitioner if interventions unsuccessful or patient reports new pain  Outcome: Progressing     Problem: SAFETY ADULT  Goal: Patient will remain free of falls  Description: INTERVENTIONS:  - Educate patient/family on patient safety including physical limitations  - Instruct patient to call for assistance with activity   - Consult OT/PT to assist with strengthening/mobility   - Keep Call bell within reach  - Keep bed low and locked with side rails adjusted as appropriate  - Keep care items and personal belongings within reach  - Initiate and maintain comfort rounds  - Make Fall Risk Sign visible to staff  - Offer Toileting every 2 Hours, in advance of need  - Initiate/Maintain bed and chair alarm  - Obtain necessary fall risk management equipment:   - Apply yellow socks and bracelet for high fall risk patients  - Consider moving patient to room near nurses station  Outcome: Progressing  Goal: Maintain or return to baseline ADL function  Description: INTERVENTIONS:  -  Assess patient's ability to carry out ADLs; assess patient's baseline for ADL function and identify physical deficits which impact ability to perform ADLs (bathing, care of mouth/teeth, toileting, grooming, dressing, etc.)  - Assess/evaluate cause of self-care deficits   - Assess range of motion  - Assess patient's mobility; develop plan if impaired  - Assess patient's need for assistive devices and provide as appropriate  - Encourage maximum independence but  intervene and supervise when necessary  - Involve family in performance of ADLs  - Assess for home care needs following discharge   - Consider OT consult to assist with ADL evaluation and planning for discharge  - Provide patient education as appropriate  Outcome: Progressing  Goal: Maintains/Returns to pre admission functional level  Description: INTERVENTIONS:  - Perform AM-PAC 6 Click Basic Mobility/ Daily Activity assessment daily.  - Set and communicate daily mobility goal to care team and patient/family/caregiver.   - Collaborate with rehabilitation services on mobility goals if consulted  - Perform Range of Motion 3 times a day.  - Reposition patient every 2 hours.  - Dangle patient 3 times a day  - Stand patient 3 times a day  - Ambulate patient 3 times a day  - Out of bed to chair 3 times a day   - Out of bed for meals 3 times a day  - Out of bed for toileting  - Record patient progress and toleration of activity level   Outcome: Progressing     Problem: DISCHARGE PLANNING  Goal: Discharge to home or other facility with appropriate resources  Description: INTERVENTIONS:  - Identify barriers to discharge w/patient and caregiver  - Arrange for needed discharge resources and transportation as appropriate  - Identify discharge learning needs (meds, wound care, etc.)  - Arrange for interpretive services to assist at discharge as needed  - Refer to Case Management Department for coordinating discharge planning if the patient needs post-hospital services based on physician/advanced practitioner order or complex needs related to functional status, cognitive ability, or social support system  Outcome: Progressing     Problem: Knowledge Deficit  Goal: Patient/family/caregiver demonstrates understanding of disease process, treatment plan, medications, and discharge instructions  Description: Complete learning assessment and assess knowledge base.  Interventions:  - Provide teaching at level of understanding  - Provide  teaching via preferred learning methods  Outcome: Progressing

## 2024-07-14 NOTE — PLAN OF CARE
Problem: PAIN - ADULT  Goal: Verbalizes/displays adequate comfort level or baseline comfort level  Description: Interventions:  - Encourage patient to monitor pain and request assistance  - Assess pain using appropriate pain scale  - Administer analgesics based on type and severity of pain and evaluate response  - Implement non-pharmacological measures as appropriate and evaluate response  - Consider cultural and social influences on pain and pain management  - Notify physician/advanced practitioner if interventions unsuccessful or patient reports new pain  Outcome: Progressing     Problem: SAFETY ADULT  Goal: Patient will remain free of falls  Description: INTERVENTIONS:  - Educate patient/family on patient safety including physical limitations  - Instruct patient to call for assistance with activity   - Consult OT/PT to assist with strengthening/mobility   - Keep Call bell within reach  - Keep bed low and locked with side rails adjusted as appropriate  - Keep care items and personal belongings within reach  - Initiate and maintain comfort rounds  - Make Fall Risk Sign visible to staff  - Offer Toileting every 2 Hours, in advance of need  - Initiate/Maintain chair alarm  - Obtain necessary fall risk management equipment: yellow socks  - Apply yellow socks and bracelet for high fall risk patients  - Consider moving patient to room near nurses station  Outcome: Progressing  Goal: Maintain or return to baseline ADL function  Description: INTERVENTIONS:  -  Assess patient's ability to carry out ADLs; assess patient's baseline for ADL function and identify physical deficits which impact ability to perform ADLs (bathing, care of mouth/teeth, toileting, grooming, dressing, etc.)  - Assess/evaluate cause of self-care deficits   - Assess range of motion  - Assess patient's mobility; develop plan if impaired  - Assess patient's need for assistive devices and provide as appropriate  - Encourage maximum independence but  intervene and supervise when necessary  - Involve family in performance of ADLs  - Assess for home care needs following discharge   - Consider OT consult to assist with ADL evaluation and planning for discharge  - Provide patient education as appropriate  Outcome: Progressing  Goal: Maintains/Returns to pre admission functional level  Description: INTERVENTIONS:  - Perform AM-PAC 6 Click Basic Mobility/ Daily Activity assessment daily.  - Set and communicate daily mobility goal to care team and patient/family/caregiver.   - Collaborate with rehabilitation services on mobility goals if consulted  - Perform Range of Motion 3 times a day.  - Dangle patient 3 times a day  - Stand patient 3 times a day  - Ambulate patient 3 times a day  - Out of bed to chair 3 times a day   - Out of bed for meals 3 times a day  - Out of bed for toileting  - Record patient progress and toleration of activity level   Outcome: Progressing

## 2024-07-15 ENCOUNTER — APPOINTMENT (INPATIENT)
Dept: RADIOLOGY | Facility: HOSPITAL | Age: 89
DRG: 291 | End: 2024-07-15
Payer: MEDICARE

## 2024-07-15 VITALS
HEIGHT: 61 IN | TEMPERATURE: 97 F | RESPIRATION RATE: 17 BRPM | DIASTOLIC BLOOD PRESSURE: 80 MMHG | SYSTOLIC BLOOD PRESSURE: 132 MMHG | HEART RATE: 120 BPM | WEIGHT: 107.4 LBS | BODY MASS INDEX: 20.28 KG/M2 | OXYGEN SATURATION: 93 %

## 2024-07-15 LAB
ANION GAP SERPL CALCULATED.3IONS-SCNC: 7 MMOL/L (ref 4–13)
BUN SERPL-MCNC: 38 MG/DL (ref 5–25)
CALCIUM SERPL-MCNC: 10.1 MG/DL (ref 8.4–10.2)
CHLORIDE SERPL-SCNC: 98 MMOL/L (ref 96–108)
CO2 SERPL-SCNC: 33 MMOL/L (ref 21–32)
CORTIS SERPL-MCNC: 16.4 UG/DL
CREAT SERPL-MCNC: 1.54 MG/DL (ref 0.6–1.3)
GFR SERPL CREATININE-BSD FRML MDRD: 30 ML/MIN/1.73SQ M
GLUCOSE SERPL-MCNC: 97 MG/DL (ref 65–140)
POTASSIUM SERPL-SCNC: 4.5 MMOL/L (ref 3.5–5.3)
SODIUM SERPL-SCNC: 138 MMOL/L (ref 135–147)

## 2024-07-15 PROCEDURE — 80048 BASIC METABOLIC PNL TOTAL CA: CPT | Performed by: FAMILY MEDICINE

## 2024-07-15 PROCEDURE — 82533 TOTAL CORTISOL: CPT | Performed by: FAMILY MEDICINE

## 2024-07-15 PROCEDURE — 99239 HOSP IP/OBS DSCHRG MGMT >30: CPT | Performed by: FAMILY MEDICINE

## 2024-07-15 PROCEDURE — 71045 X-RAY EXAM CHEST 1 VIEW: CPT

## 2024-07-15 RX ORDER — METOPROLOL SUCCINATE 50 MG/1
50 TABLET, EXTENDED RELEASE ORAL 2 TIMES DAILY
Status: DISCONTINUED | OUTPATIENT
Start: 2024-07-16 | End: 2024-07-15

## 2024-07-15 RX ORDER — METOPROLOL SUCCINATE 25 MG/1
25 TABLET, EXTENDED RELEASE ORAL ONCE
Status: COMPLETED | OUTPATIENT
Start: 2024-07-15 | End: 2024-07-15

## 2024-07-15 RX ORDER — ACETAMINOPHEN 325 MG/1
650 TABLET ORAL EVERY 6 HOURS PRN
Start: 2024-07-15

## 2024-07-15 RX ORDER — METOPROLOL SUCCINATE 50 MG/1
50 TABLET, EXTENDED RELEASE ORAL DAILY
Status: DISCONTINUED | OUTPATIENT
Start: 2024-07-16 | End: 2024-07-15 | Stop reason: HOSPADM

## 2024-07-15 RX ORDER — FUROSEMIDE 20 MG/1
TABLET ORAL
Qty: 40 TABLET | Refills: 0 | Status: SHIPPED | OUTPATIENT
Start: 2024-07-15 | End: 2024-08-14

## 2024-07-15 RX ORDER — METOPROLOL SUCCINATE 25 MG/1
25 TABLET, EXTENDED RELEASE ORAL
Start: 2024-07-15

## 2024-07-15 RX ORDER — METOPROLOL SUCCINATE 50 MG/1
50 TABLET, EXTENDED RELEASE ORAL
Start: 2024-07-16 | End: 2024-08-15

## 2024-07-15 RX ADMIN — CARBIDOPA AND LEVODOPA 1 TABLET: 25; 100 TABLET ORAL at 09:02

## 2024-07-15 RX ADMIN — METOPROLOL SUCCINATE 25 MG: 25 TABLET, EXTENDED RELEASE ORAL at 11:35

## 2024-07-15 RX ADMIN — RIVASTIGMINE TARTRATE 1.5 MG: 1.5 CAPSULE ORAL at 09:02

## 2024-07-15 RX ADMIN — ASPIRIN 81 MG: 81 TABLET, COATED ORAL at 09:02

## 2024-07-15 RX ADMIN — ATORVASTATIN CALCIUM 40 MG: 40 TABLET, FILM COATED ORAL at 09:02

## 2024-07-15 RX ADMIN — PANTOPRAZOLE SODIUM 40 MG: 40 TABLET, DELAYED RELEASE ORAL at 09:02

## 2024-07-15 RX ADMIN — METOPROLOL SUCCINATE 25 MG: 25 TABLET, EXTENDED RELEASE ORAL at 09:02

## 2024-07-15 RX ADMIN — LEVOTHYROXINE SODIUM 88 MCG: 88 TABLET ORAL at 05:39

## 2024-07-15 NOTE — CONSULTS
Consult received for CHF Ed.     Pt reports appetite overall is at baseline, feels a little anxious right now due to returning home soon reportedly and eating less at this time. Reports eating 3 meals per day at home prepared by her son. Reports son does not use salt shaker while cooking though does admit to consuming high sodium foods like regular tomato sauce, pizza, etc. Has been avoiding lunch meat due to sodium content. Chart review of weight hx shows 13% weight loss x 1 year: 6/28/23 123lb, 1/15/24 115lb, 5/13/24 110lb, 7/15/24 107lb. Pt reports UBW 108lb and reports weights higher than this are fluid related.     Reviewed purpose of low sodium fluid restricted diet with pt. Discussed foods high in sodium to be limiting/avoiding and appropriate low sodium alternatives. Reviewed current fluid restriction and foods/beverages included in this. Discussed daily weights which pt reports she does at home and to report significant changes to MD. Provided with HF Nutrition Therapy, Salt Free Seasoning Tips, Fluid Restriction Nutrition Therapy handouts and RD contact information, to relay to son who helps with meal preparation at home. Continue cardiac diet, fluid restriction per MD.

## 2024-07-15 NOTE — ASSESSMENT & PLAN NOTE
Wt Readings from Last 3 Encounters:   07/15/24 48.7 kg (107 lb 6.4 oz)   06/21/24 50.9 kg (112 lb 3.2 oz)   2d echo from 04/24 shows EF of 30 to 35%.  Patient was taking oral Lasix at home however got congested again.  ct chest shows New bilateral pleural effusions. Areas of patchy groundglass opacity could represent mild edema or atypical pneumonitis.   Will place on Lasix 40 mg IV twice daily and assess response.  Monitor Renal function and blood pressure closely  creatinine bumped up to 1.7 however continued diuresis and creatinine improved to 1.49.  repeat cxray shows improvement in pulm edema and switch to oral diuretics today lasix 40 mg oral daily  repeat labs outpatient in 7 to 10 days and also follow-up with cardiologist in 2 to 3 weeks

## 2024-07-15 NOTE — PROGRESS NOTES
Patient:    MRN:  [id number]    Kandi Request ID:  [id number]    Level of care reserved:  Home Health Agency    Partner Reserved:  [facility] [place] [address] [id number]    Clinical needs requested:    Geography searched:  [address]    Start of Service:    Request sent:  4:14pm EDT on 7/12/2024 by Elizabeth Lundberg    Partner reserved:  8:15am EDT on 7/15/2024 by Zofia Tadeo    Choice list shared:  8:15am EDT on 7/15/2024 by Zofia Tadeo

## 2024-07-15 NOTE — CASE MANAGEMENT
Case Management Discharge Planning Note    Patient name Radha Engel  Location /-01 MRN [id number]  : 1935 Date 7/15/2024       Current Admission Date: 2024  Current Admission Diagnosis:Acute on chronic combined systolic and diastolic congestive heart failure (HCC)   Patient Active Problem List    Diagnosis Date Noted Date Diagnosed    CLINT (acute kidney injury) (HCC) 2024     Acute on chronic combined systolic and diastolic congestive heart failure (HCC) 2024     Chronic obstructive pulmonary disease with acute exacerbation (HCC) 2024     Chronic respiratory failure with hypoxia (HCC) 2024     Pancytopenia (HCC) 2024     Paroxysmal atrial fibrillation (HCC) 2024     Parkinsons 2024     CREST syndrome ([facility]) 2024     CAD (coronary artery disease) 2024       LOS (days): 3  Geometric Mean LOS (GMLOS) (days): 3.9  Days to GMLOS:1     OBJECTIVE:  Risk of Unplanned Readmission Score: 17.98         Current admission status: Inpatient   Preferred Pharmacy:   [id number] [address] [address]  Phone: [phone] Fax: [phone]    Primary Care Provider: Dayana Glaser PA-C    Primary Insurance: MEDICARE  Secondary Insurance: BLUE CROSS    DISCHARGE DETAILS:     [facility] [place] [facility] for patient as patient was active prior to admission.

## 2024-07-15 NOTE — ASSESSMENT & PLAN NOTE
Currently rate controlled with beta-blockers .no anticoagulation due to pancytopenia. resume Toprol-XL 50 mg am daily and 25 mg pm daily as per home regimen

## 2024-07-15 NOTE — ASSESSMENT & PLAN NOTE
Wt Readings from Last 3 Encounters:   07/15/24 48.7 kg (107 lb 6.4 oz)   06/21/24 50.9 kg (112 lb 3.2 oz)   2d echo from 04/24 shows EF of 30 to 35%.  Patient was taking oral Lasix at home however got congested again.  ct chest shows New bilateral pleural effusions. Areas of patchy groundglass opacity could represent mild edema or atypical pneumonitis.   Will place on Lasix 40 mg IV twice daily and assess response.  Monitor Renal function and blood pressure closely  creatinine bumped up to 1.7 however continued diuresis and creatinine improved to 1.49.  repeat cxray shows improvement in pulm edema and switch to oral diuretics today lasix 40 mg oral 3 times a week and 20 mg oral 4 times a week  repeat labs outpatient in 7 to 10 days and also follow-up with cardiologist in 2 to 3 weeks

## 2024-07-15 NOTE — CASE MANAGEMENT
Case Management Discharge Planning Note    Patient name Radha Engel  Location /-01 MRN [id number]  : 1935 Date 7/15/2024       Current Admission Date: 2024  Current Admission Diagnosis:Acute on chronic combined systolic and diastolic congestive heart failure (HCC)   Patient Active Problem List    Diagnosis Date Noted Date Diagnosed    CLINT (acute kidney injury) (HCC) 2024     Acute on chronic combined systolic and diastolic congestive heart failure (HCC) 2024     Chronic obstructive pulmonary disease with acute exacerbation (HCC) 2024     Chronic respiratory failure with hypoxia (HCC) 2024     Pancytopenia (HCC) 2024     Paroxysmal atrial fibrillation (HCC) 2024     Parkinsons 2024     CREST syndrome ([facility]) 2024     CAD (coronary artery disease) 2024       LOS (days): 3  Geometric Mean LOS (GMLOS) (days): 3.9  Days to GMLOS:0.7     OBJECTIVE:  Risk of Unplanned Readmission Score: 18.78         Current admission status: Inpatient   Preferred Pharmacy:   [id number] - [place] - [address] [place] [id number]  Phone: [phone] Fax: [phone]    Primary Care Provider: Dayana Glaser PA-C    Primary Insurance: MEDICARE  Secondary Insurance: BLUE CROSS    DISCHARGE DETAILS:        [facility] order attached in [facility] for [facility]  AVS faxed to [facility]

## 2024-07-15 NOTE — CASE MANAGEMENT
Case Management Discharge Planning Note    Patient name Radha Engel  Location /-01 MRN [id number]  : 1935 Date 7/15/2024       Current Admission Date: 2024  Current Admission Diagnosis:Acute on chronic combined systolic and diastolic congestive heart failure (HCC)   Patient Active Problem List    Diagnosis Date Noted Date Diagnosed    CLINT (acute kidney injury) (HCC) 2024     Acute on chronic combined systolic and diastolic congestive heart failure (HCC) 2024     Chronic obstructive pulmonary disease with acute exacerbation (HCC) 2024     Chronic respiratory failure with hypoxia (HCC) 2024     Pancytopenia (HCC) 2024     Paroxysmal atrial fibrillation (HCC) 2024     Parkinsons 2024     CREST syndrome (HCC) 2024     CAD (coronary artery disease) 2024       LOS (days): 3  Geometric Mean LOS (GMLOS) (days): 3.9  Days to GMLOS:0.8     OBJECTIVE:  Risk of Unplanned Readmission Score: 17.98         Current admission status: Inpatient   Preferred Pharmacy:   [id number] [address] [address]  Phone: [phone] Fax: [phone]    Primary Care Provider: Dayana Glaser PA-C    Primary Insurance: MEDICARE  Secondary Insurance: BLUE CROSS    DISCHARGE DETAILS:    Discharge planning discussed with:: patient  Freedom of Choice: Yes  Comments - Freedom of Choice: HC resumption of care in [place]                     Requested Home Health Care         Is the patient interested in HHC at discharge?: Yes  Home Health Discipline requested:: Nursing, Physical Therapy, Occupational Therapy  Home Health Agency Name:: [facility]  HHA External Referral Reason (only applicable if external HHA name selected): Patient has established relationship with provider  Home Health Follow-Up Provider:: PCP  Home Health Services Needed:: Evaluate Functional Status and Safety,  Strengthening/Theraputic Exercises to Improve Function, Other (comment), Gait/ADL Training (Medication Management)  Homebound Criteria Met:: Uses an Assist Device (i.e. cane, walker, etc)  Supporting Clincal Findings:: Limited Endurance    DME Referral Provided  Referral made for DME?: No    Other Referral/Resources/Interventions Provided:  Interventions: [facility]  Referral Comments: [facility]    Would you like to participate in our [facility] service program?  : No - Declined    Treatment Team Recommendation: Home with Home Health Care  Discharge Destination Plan:: Home with Home Health Care  Transport at Discharge : Family                             IMM Given (Date):: 07/15/24  IMM Given to:: Patient  Family notified:: appeal rights provided to patient           CM met with patient at bedside to discuss discharge planning today. Patient indicated she resides with her daughter but son will provide transport home.     CM secured [facility] in [place] for resumption of care for patient.    CM spoke to patient at the bedside, reviewed DC IMM with patient and informed that patient can stay an additional 4 hours for reconsidering appealing the discharge as the medicare rights were review on the day of discharge. Pt verbalized understanding and feels ready to go home and does not intend to stay 4 hours to reconsider.  IMM placed in bin for filing.

## 2024-07-15 NOTE — ASSESSMENT & PLAN NOTE
Currently rate controlled with beta-blockers .no anticoagulation due to pancytopenia. resume Toprol-XL 50 mg daily as per home regimen

## 2024-07-15 NOTE — DISCHARGE SUMMARY
[facility]  Discharge- Radha Engel 1935, 88 y.o. female MRN: [id number]  Unit/Bed#: -01 Encounter: [id number]  Primary Care Provider: Dayana Glaser PA-C   Date and time admitted to hospital: 7/12/2024  7:26 AM    * Acute on chronic combined systolic and diastolic congestive heart failure (HCC)  Assessment & Plan  Wt Readings from Last 3 Encounters:   07/15/24 48.7 kg (107 lb 6.4 oz)   06/21/24 50.9 kg (112 lb 3.2 oz)   2d echo from 04/24 shows EF of 30 to 35%.  Patient was taking oral Lasix at home however got congested again.  ct chest shows New bilateral pleural effusions. Areas of patchy groundglass opacity could represent mild edema or atypical pneumonitis.   Will place on Lasix 40 mg IV twice daily and assess response.  Monitor Renal function and blood pressure closely  creatinine bumped up to 1.7 however continued diuresis and creatinine improved to 1.49.  repeat cxray shows improvement in pulm edema and switch to oral diuretics today lasix 40 mg oral 3 times a week and 20 mg oral 4 times a week  repeat labs outpatient in 7 to 10 days and also follow-up with cardiologist in 2 to 3 weeks            Parkinsons  Assessment & Plan  Continue Sinemet and rivastigmine      Paroxysmal atrial fibrillation (HCC)  Assessment & Plan  Currently rate controlled with beta-blockers .no anticoagulation due to pancytopenia. resume Toprol-XL 50 mg am daily and 25 mg pm daily as per home regimen    Pancytopenia (HCC)  Assessment & Plan  Chronic issue noted in the past we will discuss with her further.  Avoid DVT prophylaxis    Chronic respiratory failure with hypoxia (HCC)  Assessment & Plan  2 L of oxygen continuously at baseline    Chronic obstructive pulmonary disease with acute exacerbation (HCC)  Assessment & Plan  Seems to also have mild COPD flare will place on Neb treatments and observe response.  On 2 L of oxygen continuously at baseline.  Currently at her  baseline      Discharging Physician / Practitioner: Debora Gomez MD  PCP: Dayana Glaser PA-C  Admission Date:   Admission Orders (From admission, onward)       Ordered        07/12/24 0932  INPATIENT ADMISSION  Once                          Discharge Date: 07/15/24    Medical Problems       Resolved Problems  Date Reviewed: 7/15/2024   None         Consultations During Hospital Stay:  Physical therapy and Occupational Therapy    Procedures Performed:   None    Significant Findings / Test Results:   CT chest abdomen pelvis wo contrast    Result Date: 7/12/2024  Impression: New bilateral pleural effusions. Areas of patchy groundglass opacity could represent mild edema or atypical pneumonitis. Background of chronic paraseptal emphysema versus fibrotic changes with honeycombing. Post distention of the esophagus with debris and thickening could be related to esophagitis and should be correlated any history of dysphagia/chest pain. Aspiration pneumonia is a consideration given the esophageal findings. GI follow-up is advised. The abdomen demonstrates some fluid-filled loops of bowel which could be related to enterocolitis without obvious point of transition to suggest obstruction at this time. Chronic compression fractures of L1 and L2. This was discussed with Dr. Mcmanus at 9:30 a.m. Workstation performed: [id number]      Incidental Findings:   none    Test Results Pending at Discharge (will require follow up):   none     Outpatient Tests Requested:  Bmp in 7-10 days  Outpatient follow-up with cardiology in 2 to 3 weeks at least    Complications: None    Reason for Admission: Acute on chronic systolic and diastolic CHF    Hospital Course:     Radha Engel is a 88 y.o. female patient who originally presented to the hospital on 7/12/2024 due to chronic systolic and diastolic CHF.  Patient received IV Lasix following which she diuresed adequately and is now euvolemic and back to her baseline.  Will increase her  "home regimen to Lasix from 20 mg 4 times weekly and  40 mg 3 times weekly recommend BMP in 7 to 10 days and outpatient follow-up with her cardiologist.  Patient is a very frail elderly female and high risk for developing CLINT and hence her renal function needs to be monitored closely.    Please see above list of diagnoses and related plan for additional information.     Condition at Discharge: good     Discharge Day Visit / Exam:     Subjective: Patient denies any chest pain or shortness of breath she feels well today.  Vitals: Blood Pressure: 132/80 (07/15/24 1135)  Pulse: (!) 120 (07/15/24 0943)  Temperature: (!) 97 °F (36.1 °C) (07/15/24 0711)  Temp Source: Temporal (07/14/24 1900)  Respirations: 17 (07/15/24 0711)  Height: 5' 1\" (154.9 cm) (07/12/24 1618)  Weight - Scale: 48.7 kg (107 lb 6.4 oz) (07/15/24 0541)  SpO2: 93 % (07/15/24 0943)  Exam:   Physical Exam  Vitals and nursing note reviewed.   Constitutional:       Appearance: Normal appearance.   HENT:      Head: Normocephalic and atraumatic.      Right Ear: External ear normal.      Left Ear: External ear normal.      Nose: Nose normal.      Mouth/Throat:      Pharynx: Oropharynx is clear.   Eyes:      Pupils: Pupils are equal, round, and reactive to light.   Cardiovascular:      Rate and Rhythm: Normal rate and regular rhythm.      Heart sounds: Normal heart sounds.   Pulmonary:      Effort: Pulmonary effort is normal.      Breath sounds: Normal breath sounds.   Abdominal:      General: Bowel sounds are normal.      Palpations: Abdomen is soft.      Tenderness: There is no abdominal tenderness.   Musculoskeletal:         General: Normal range of motion.      Cervical back: Normal range of motion and neck supple.   Skin:     General: Skin is warm and dry.      Capillary Refill: Capillary refill takes less than 2 seconds.   Neurological:      Mental Status: She is alert. Mental status is at baseline.   Psychiatric:         Mood and Affect: Mood normal. "         Discussion with Family: discussed with daughter    Discharge instructions/Information to patient and family:   See after visit summary for information provided to patient and family.      Provisions for Follow-Up Care:  See after visit summary for information related to follow-up care and any pertinent home health orders.      Disposition:     Home with [facility] Services (Reminder: Complete face to face encounter)    For Discharges to [facility]:   Not Applicable to this Patient - Not Applicable to this Patient    Planned Readmission: none     Discharge Statement:  I spent 35 minutes discharging the patient. This time was spent on the day of discharge. I had direct contact with the patient on the day of discharge. Greater than 50% of the total time was spent examining patient, answering all patient questions, arranging and discussing plan of care with patient as well as directly providing post-discharge instructions.  Additional time then spent on discharge activities.    Discharge Medications:  See after visit summary for reconciled discharge medications provided to patient and family.      ** Please Note: This note has been constructed using a voice recognition system **